# Patient Record
Sex: MALE | Race: WHITE | NOT HISPANIC OR LATINO | URBAN - METROPOLITAN AREA
[De-identification: names, ages, dates, MRNs, and addresses within clinical notes are randomized per-mention and may not be internally consistent; named-entity substitution may affect disease eponyms.]

---

## 2017-05-14 ENCOUNTER — INPATIENT (INPATIENT)
Facility: HOSPITAL | Age: 62
LOS: 0 days | Discharge: HOME | DRG: 192 | End: 2017-05-15
Attending: HOSPITALIST | Admitting: HOSPITALIST
Payer: MEDICAID

## 2017-05-14 VITALS
RESPIRATION RATE: 24 BRPM | HEART RATE: 98 BPM | SYSTOLIC BLOOD PRESSURE: 175 MMHG | HEIGHT: 69 IN | DIASTOLIC BLOOD PRESSURE: 90 MMHG | OXYGEN SATURATION: 93 % | WEIGHT: 139.99 LBS | TEMPERATURE: 94 F

## 2017-05-14 DIAGNOSIS — J44.9 CHRONIC OBSTRUCTIVE PULMONARY DISEASE, UNSPECIFIED: ICD-10-CM

## 2017-05-14 LAB
ALBUMIN SERPL ELPH-MCNC: 4 G/DL — SIGNIFICANT CHANGE UP (ref 3.3–5.2)
ALP SERPL-CCNC: 78 U/L — SIGNIFICANT CHANGE UP (ref 40–120)
ALT FLD-CCNC: 22 U/L — SIGNIFICANT CHANGE UP
ANION GAP SERPL CALC-SCNC: 11 MMOL/L — SIGNIFICANT CHANGE UP (ref 5–17)
ANISOCYTOSIS BLD QL: SLIGHT — SIGNIFICANT CHANGE UP
APTT BLD: 29.4 SEC — SIGNIFICANT CHANGE UP (ref 27.5–37.4)
AST SERPL-CCNC: 28 U/L — SIGNIFICANT CHANGE UP
BASE EXCESS BLDA CALC-SCNC: 6 MMOL/L — HIGH (ref -3–3)
BILIRUB SERPL-MCNC: 0.3 MG/DL — LOW (ref 0.4–2)
BLOOD GAS COMMENTS ARTERIAL: SIGNIFICANT CHANGE UP
BUN SERPL-MCNC: 13 MG/DL — SIGNIFICANT CHANGE UP (ref 8–20)
CALCIUM SERPL-MCNC: 9.1 MG/DL — SIGNIFICANT CHANGE UP (ref 8.6–10.2)
CHLORIDE SERPL-SCNC: 100 MMOL/L — SIGNIFICANT CHANGE UP (ref 98–107)
CO2 SERPL-SCNC: 29 MMOL/L — SIGNIFICANT CHANGE UP (ref 22–29)
CREAT SERPL-MCNC: 0.64 MG/DL — SIGNIFICANT CHANGE UP (ref 0.5–1.3)
GAS PNL BLDA: SIGNIFICANT CHANGE UP
GLUCOSE SERPL-MCNC: 127 MG/DL — HIGH (ref 70–115)
HCO3 BLDA-SCNC: 30 MMOL/L — HIGH (ref 20–26)
HCT VFR BLD CALC: 41.7 % — LOW (ref 42–52)
HGB BLD-MCNC: 13.4 G/DL — LOW (ref 14–18)
HOROWITZ INDEX BLDA+IHG-RTO: 0.21 — SIGNIFICANT CHANGE UP
HYPOCHROMIA BLD QL: SLIGHT — SIGNIFICANT CHANGE UP
INR BLD: 1.04 RATIO — SIGNIFICANT CHANGE UP (ref 0.88–1.16)
LYMPHOCYTES # BLD AUTO: 9 % — LOW (ref 20–55)
MACROCYTES BLD QL: SLIGHT — SIGNIFICANT CHANGE UP
MAGNESIUM SERPL-MCNC: 2.3 MG/DL — SIGNIFICANT CHANGE UP (ref 1.6–2.6)
MCHC RBC-ENTMCNC: 30.9 PG — SIGNIFICANT CHANGE UP (ref 27–31)
MCHC RBC-ENTMCNC: 32.1 G/DL — SIGNIFICANT CHANGE UP (ref 32–36)
MCV RBC AUTO: 96.1 FL — HIGH (ref 80–94)
MICROCYTES BLD QL: SLIGHT — SIGNIFICANT CHANGE UP
MONOCYTES NFR BLD AUTO: 6 % — SIGNIFICANT CHANGE UP (ref 3–10)
NEUTROPHILS NFR BLD AUTO: 85 % — HIGH (ref 37–73)
NT-PROBNP SERPL-SCNC: 183 PG/ML — SIGNIFICANT CHANGE UP (ref 0–300)
OVALOCYTES BLD QL SMEAR: SLIGHT — SIGNIFICANT CHANGE UP
PCO2 BLDA: 50 MMHG — HIGH (ref 35–45)
PH BLDA: 7.41 — SIGNIFICANT CHANGE UP (ref 7.35–7.45)
PLAT MORPH BLD: NORMAL — SIGNIFICANT CHANGE UP
PLATELET # BLD AUTO: 237 K/UL — SIGNIFICANT CHANGE UP (ref 150–400)
PO2 BLDA: 63 MMHG — LOW (ref 83–108)
POIKILOCYTOSIS BLD QL AUTO: SLIGHT — SIGNIFICANT CHANGE UP
POTASSIUM SERPL-MCNC: 4.8 MMOL/L — SIGNIFICANT CHANGE UP (ref 3.5–5.3)
POTASSIUM SERPL-SCNC: 4.8 MMOL/L — SIGNIFICANT CHANGE UP (ref 3.5–5.3)
PROT SERPL-MCNC: 7.8 G/DL — SIGNIFICANT CHANGE UP (ref 6.6–8.7)
PROTHROM AB SERPL-ACNC: 11.5 SEC — SIGNIFICANT CHANGE UP (ref 9.8–12.7)
RBC # BLD: 4.34 M/UL — LOW (ref 4.6–6.2)
RBC # FLD: 13.5 % — SIGNIFICANT CHANGE UP (ref 11–15.6)
RBC BLD AUTO: ABNORMAL
SAO2 % BLDA: 93 % — LOW (ref 95–99)
SODIUM SERPL-SCNC: 140 MMOL/L — SIGNIFICANT CHANGE UP (ref 135–145)
TROPONIN T SERPL-MCNC: <0.01 NG/ML — SIGNIFICANT CHANGE UP (ref 0–0.06)
TSH SERPL-MCNC: 1.72 UIU/ML — SIGNIFICANT CHANGE UP (ref 0.27–4.2)
WBC # BLD: 15.1 K/UL — HIGH (ref 4.8–10.8)
WBC # FLD AUTO: 15.1 K/UL — HIGH (ref 4.8–10.8)

## 2017-05-14 PROCEDURE — 71020: CPT | Mod: 26

## 2017-05-14 PROCEDURE — 99222 1ST HOSP IP/OBS MODERATE 55: CPT

## 2017-05-14 PROCEDURE — 99285 EMERGENCY DEPT VISIT HI MDM: CPT | Mod: 25

## 2017-05-14 PROCEDURE — 93010 ELECTROCARDIOGRAM REPORT: CPT

## 2017-05-14 RX ORDER — NICOTINE POLACRILEX 2 MG
1 GUM BUCCAL DAILY
Qty: 0 | Refills: 0 | Status: DISCONTINUED | OUTPATIENT
Start: 2017-05-14 | End: 2017-05-15

## 2017-05-14 RX ORDER — IPRATROPIUM/ALBUTEROL SULFATE 18-103MCG
3 AEROSOL WITH ADAPTER (GRAM) INHALATION EVERY 6 HOURS
Qty: 0 | Refills: 0 | Status: DISCONTINUED | OUTPATIENT
Start: 2017-05-14 | End: 2017-05-15

## 2017-05-14 RX ORDER — IPRATROPIUM/ALBUTEROL SULFATE 18-103MCG
3 AEROSOL WITH ADAPTER (GRAM) INHALATION ONCE
Qty: 0 | Refills: 0 | Status: COMPLETED | OUTPATIENT
Start: 2017-05-14 | End: 2017-05-14

## 2017-05-14 RX ORDER — ENOXAPARIN SODIUM 100 MG/ML
40 INJECTION SUBCUTANEOUS DAILY
Qty: 0 | Refills: 0 | Status: DISCONTINUED | OUTPATIENT
Start: 2017-05-14 | End: 2017-05-15

## 2017-05-14 RX ORDER — PANTOPRAZOLE SODIUM 20 MG/1
40 TABLET, DELAYED RELEASE ORAL
Qty: 0 | Refills: 0 | Status: DISCONTINUED | OUTPATIENT
Start: 2017-05-14 | End: 2017-05-15

## 2017-05-14 RX ORDER — AZITHROMYCIN 500 MG/1
500 TABLET, FILM COATED ORAL DAILY
Qty: 0 | Refills: 0 | Status: DISCONTINUED | OUTPATIENT
Start: 2017-05-14 | End: 2017-05-15

## 2017-05-14 RX ADMIN — Medication 3 MILLILITER(S): at 22:01

## 2017-05-14 RX ADMIN — Medication 40 MILLIGRAM(S): at 21:55

## 2017-05-14 RX ADMIN — Medication 3 MILLILITER(S): at 08:50

## 2017-05-14 RX ADMIN — Medication 3 MILLILITER(S): at 06:58

## 2017-05-14 RX ADMIN — ENOXAPARIN SODIUM 40 MILLIGRAM(S): 100 INJECTION SUBCUTANEOUS at 11:38

## 2017-05-14 RX ADMIN — Medication 40 MILLIGRAM(S): at 11:39

## 2017-05-14 RX ADMIN — AZITHROMYCIN 500 MILLIGRAM(S): 500 TABLET, FILM COATED ORAL at 11:38

## 2017-05-14 RX ADMIN — Medication 1 PATCH: at 17:17

## 2017-05-14 RX ADMIN — Medication 125 MILLIGRAM(S): at 08:11

## 2017-05-14 RX ADMIN — Medication 3 MILLILITER(S): at 15:17

## 2017-05-14 NOTE — ED ADULT NURSE REASSESSMENT NOTE - NS ED NURSE REASSESS COMMENT FT1
verbal report given to GAVIN liriano in holding area of ED. pt rseitng comfortably in cart. breathing si even and unlabored. pt awaiting bed availability on floor.

## 2017-05-14 NOTE — H&P ADULT - NSHPLABSRESULTS_GEN_ALL_CORE
13.4   15.1  )-----------( 237      ( 14 May 2017 08:18 )             41.7     05-14    140  |  100  |  13.0  ----------------------------<  127<H>  4.8   |  29.0  |  0.64    Ca    9.1      14 May 2017 08:18  Mg     2.3     05-14    TPro  7.8  /  Alb  4.0  /  TBili  0.3<L>  /  DBili  x   /  AST  28  /  ALT  22  /  AlkPhos  78  05-14    PT/INR - ( 14 May 2017 08:18 )   PT: 11.5 sec;   INR: 1.04 ratio         PTT - ( 14 May 2017 08:18 )  PTT:29.4 sec      CXR pending.

## 2017-05-14 NOTE — H&P ADULT - NSHPPHYSICALEXAM_GEN_ALL_CORE
Vital Signs   T(C): 36.7  HR: 89   BP: 156/78 mmhg.   RR: 18   SpO2: 98%     PHYSICAL EXAM:    GENERAL: Middle age male looking comfortable  HEENT: PERRL, +EOMI  NECK: soft, Supple, No JVD,   CHEST/LUNG: decrease air entry bilaterally; some wheezing b/l.   HEART: S1S2+, Regular rate and rhythm; No murmurs.   ABDOMEN: Soft, Nontender, Nondistended; Bowel sounds present  EXTREMITIES:  2+ Peripheral Pulses, No edema  SKIN: No rashes or lesions  NEURO: AAOX3, no focal deficits, no motor r sensory loss  PSYCH: normal mood

## 2017-05-14 NOTE — H&P ADULT - ASSESSMENT
Assessment: Given Patients info patient has COPD exacerbation.     Plan:  Oxygen, duo nebs, IV steroids, will rudolph, azithromycin follow CXR.   DVT prophylaxis Lovenox   GI protection: Protonix

## 2017-05-14 NOTE — H&P ADULT - NSHPREVIEWOFSYSTEMS_GEN_ALL_CORE
CONSTITUTIONAL: No fever, has some fatigue  RESPIRATORY:  has dry cough, wheezing and shortness of breath  CARDIOVASCULAR: No chest pain, palpitations  GASTROINTESTINAL: No abdominal or epigastric pain. No nausea, vomiting  NEUROLOGICAL: No headaches,  loss of strength.  MISCELLANEOUS: No joint swelling or pain

## 2017-05-14 NOTE — ED ADULT TRIAGE NOTE - CHIEF COMPLAINT QUOTE
I cant breath, I was diagnosed with COPD. I cant breath, I was diagnosed with COPD. wheeze BL, RR even and labored. Md called to bedside.

## 2017-05-14 NOTE — ED PROVIDER NOTE - PHYSICAL EXAMINATION
Constitutinal :Appears uncomfortable, talking in short sentences, low oxygen on ra  Head :NC AT , no swelling  Eyes :eomi, no swelling  Mouth :mm moist,  Neck : supple, trachea in midline  Chest :Laurent air entry, symm chest expansion, moderate distress, retractions, dec air entry, rare wheezing  Heart :S1 S2 distant  Abdomen :abd soft, non tender, thin  Musc/Skel :ext no swelling, no deformity, no spine tenderness, distal pulses present  Neuro  :AAO 3 no focal deficits

## 2017-05-14 NOTE — ED PROVIDER NOTE - NS ED ROS FT
no weight change, no fever or chills  no rash, no bruises  no visual changes no discharge  no cough cold or congestion,   has sob, no chest pain  no orthopnea, no pnd  no abd pain, no n/v/d  no hematuria, no change in urinary habits  no headache, no paresthesia  no previous psych evaluation

## 2017-05-14 NOTE — ED ADULT NURSE NOTE - OBJECTIVE STATEMENT
pt presents to ED with difficulty breathing since last night. pt has hx of COPD. b/l wheezing noted. denies chest pain. denies cough. afebrile. breathing is even and unlabored,. pt received on 2L02 is 98%. a&ox3. abd soft nt nd +bs denies n/v/d, skin wd/i. will continue to monitor and reassess

## 2017-05-14 NOTE — H&P ADULT - HISTORY OF PRESENT ILLNESS
62 y/o M with hx of extensive smoking for >25 years, comes in the cough and sob along with wheezing started couple of days ago and has been worsening, it started after he was trying to stop smoking, his cough is dry, smoking make it worse, patient denies fever, chills, chest pain, nausea, vomiting, he has no allergies, denies runny or stuffy nose, sore throat, no recent travel or sick contact, he works in construction company.

## 2017-05-15 ENCOUNTER — TRANSCRIPTION ENCOUNTER (OUTPATIENT)
Age: 62
End: 2017-05-15

## 2017-05-15 VITALS
OXYGEN SATURATION: 98 % | HEART RATE: 104 BPM | TEMPERATURE: 97 F | RESPIRATION RATE: 16 BRPM | DIASTOLIC BLOOD PRESSURE: 71 MMHG | SYSTOLIC BLOOD PRESSURE: 133 MMHG

## 2017-05-15 LAB
HCT VFR BLD CALC: 40.6 % — LOW (ref 42–52)
HGB BLD-MCNC: 13.4 G/DL — LOW (ref 14–18)
MAGNESIUM SERPL-MCNC: 2.1 MG/DL — SIGNIFICANT CHANGE UP (ref 1.8–2.6)
MCHC RBC-ENTMCNC: 31.3 PG — HIGH (ref 27–31)
MCHC RBC-ENTMCNC: 33 G/DL — SIGNIFICANT CHANGE UP (ref 32–36)
MCV RBC AUTO: 94.9 FL — HIGH (ref 80–94)
PHOSPHATE SERPL-MCNC: 3.2 MG/DL — SIGNIFICANT CHANGE UP (ref 2.4–4.7)
PLATELET # BLD AUTO: 240 K/UL — SIGNIFICANT CHANGE UP (ref 150–400)
RBC # BLD: 4.28 M/UL — LOW (ref 4.6–6.2)
RBC # FLD: 13.7 % — SIGNIFICANT CHANGE UP (ref 11–15.6)
WBC # BLD: 26.5 K/UL — HIGH (ref 4.8–10.8)
WBC # FLD AUTO: 26.5 K/UL — HIGH (ref 4.8–10.8)

## 2017-05-15 PROCEDURE — 85610 PROTHROMBIN TIME: CPT

## 2017-05-15 PROCEDURE — 99239 HOSP IP/OBS DSCHRG MGMT >30: CPT

## 2017-05-15 PROCEDURE — 85730 THROMBOPLASTIN TIME PARTIAL: CPT

## 2017-05-15 PROCEDURE — 96374 THER/PROPH/DIAG INJ IV PUSH: CPT

## 2017-05-15 PROCEDURE — 93005 ELECTROCARDIOGRAM TRACING: CPT

## 2017-05-15 PROCEDURE — 84100 ASSAY OF PHOSPHORUS: CPT

## 2017-05-15 PROCEDURE — 83880 ASSAY OF NATRIURETIC PEPTIDE: CPT

## 2017-05-15 PROCEDURE — 85027 COMPLETE CBC AUTOMATED: CPT

## 2017-05-15 PROCEDURE — 94640 AIRWAY INHALATION TREATMENT: CPT

## 2017-05-15 PROCEDURE — 80053 COMPREHEN METABOLIC PANEL: CPT

## 2017-05-15 PROCEDURE — 82803 BLOOD GASES ANY COMBINATION: CPT

## 2017-05-15 PROCEDURE — 99285 EMERGENCY DEPT VISIT HI MDM: CPT | Mod: 25

## 2017-05-15 PROCEDURE — 84443 ASSAY THYROID STIM HORMONE: CPT

## 2017-05-15 PROCEDURE — 83735 ASSAY OF MAGNESIUM: CPT

## 2017-05-15 PROCEDURE — 71046 X-RAY EXAM CHEST 2 VIEWS: CPT

## 2017-05-15 PROCEDURE — 84484 ASSAY OF TROPONIN QUANT: CPT

## 2017-05-15 PROCEDURE — 36415 COLL VENOUS BLD VENIPUNCTURE: CPT

## 2017-05-15 RX ORDER — NICOTINE POLACRILEX 2 MG
1 GUM BUCCAL
Qty: 20 | Refills: 0 | OUTPATIENT
Start: 2017-05-15 | End: 2017-06-04

## 2017-05-15 RX ORDER — AZITHROMYCIN 500 MG/1
1 TABLET, FILM COATED ORAL
Qty: 4 | Refills: 0 | OUTPATIENT
Start: 2017-05-15 | End: 2017-05-19

## 2017-05-15 RX ORDER — FLUTICASONE PROPIONATE AND SALMETEROL 50; 250 UG/1; UG/1
1 POWDER ORAL; RESPIRATORY (INHALATION)
Qty: 1 | Refills: 1 | OUTPATIENT
Start: 2017-05-15 | End: 2017-07-13

## 2017-05-15 RX ORDER — ALBUTEROL 90 UG/1
1 AEROSOL, METERED ORAL
Qty: 1 | Refills: 1 | OUTPATIENT
Start: 2017-05-15 | End: 2017-07-13

## 2017-05-15 RX ORDER — NICOTINE POLACRILEX 2 MG
1 GUM BUCCAL
Qty: 21 | Refills: 0 | OUTPATIENT
Start: 2017-05-15 | End: 2017-06-05

## 2017-05-15 RX ORDER — AZITHROMYCIN 500 MG/1
1 TABLET, FILM COATED ORAL
Qty: 3 | Refills: 0 | OUTPATIENT
Start: 2017-05-15 | End: 2017-05-18

## 2017-05-15 RX ORDER — PANTOPRAZOLE SODIUM 20 MG/1
1 TABLET, DELAYED RELEASE ORAL
Qty: 30 | Refills: 0 | OUTPATIENT
Start: 2017-05-15 | End: 2017-06-14

## 2017-05-15 RX ADMIN — ENOXAPARIN SODIUM 40 MILLIGRAM(S): 100 INJECTION SUBCUTANEOUS at 12:05

## 2017-05-15 RX ADMIN — Medication 3 MILLILITER(S): at 03:21

## 2017-05-15 RX ADMIN — Medication 1 PATCH: at 12:06

## 2017-05-15 RX ADMIN — Medication 40 MILLIGRAM(S): at 05:26

## 2017-05-15 RX ADMIN — Medication 3 MILLILITER(S): at 09:29

## 2017-05-15 RX ADMIN — AZITHROMYCIN 500 MILLIGRAM(S): 500 TABLET, FILM COATED ORAL at 12:06

## 2017-05-15 RX ADMIN — PANTOPRAZOLE SODIUM 40 MILLIGRAM(S): 20 TABLET, DELAYED RELEASE ORAL at 08:57

## 2017-05-15 NOTE — DISCHARGE NOTE ADULT - PATIENT PORTAL LINK FT
“You can access the [facility] Patient Portal, offered by [facility], by registering with the following website: [email or web address]”

## 2017-05-15 NOTE — DISCHARGE NOTE ADULT - CARE PLAN
Principal Discharge DX:	Chronic obstructive pulmonary disease with acute exacerbation  Goal:	take tappering steriod, antibiotics and inhalor  Instructions for follow-up, activity and diet:	Follow up with [facility] at [facility] in 1 week, please call for appointment.  Secondary Diagnosis:	Smoking  Goal:	Use nicotine patches, no smoking please. Principal Discharge DX:	Chronic obstructive pulmonary disease with acute exacerbation  Goal:	take tappering steriod, antibiotics and inhalor  Instructions for follow-up, activity and diet:	Follow up with [facility] at [facility] in 1 week, please call for appointment.  Secondary Diagnosis:	Smoking  Goal:	Use nicotine patches, no smoking please. Principal Discharge DX:	Chronic obstructive pulmonary disease with acute exacerbation  Goal:	take tappering steriod, antibiotics and inhalor  Instructions for follow-up, activity and diet:	Follow up with [facility] at [facility] in 1 week, please call for appointment.  Secondary Diagnosis:	Smoking  Goal:	Use nicotine patches, no smoking please. Principal Discharge DX:	Chronic obstructive pulmonary disease with acute exacerbation  Goal:	take tappering steriod, antibiotics and inhalor  Instructions for follow-up, activity and diet:	Follow up with [facility] at [facility] in 1 week, please call for appointment.  Secondary Diagnosis:	Smoking  Goal:	Use nicotine patches, no smoking please.

## 2017-05-15 NOTE — DISCHARGE NOTE ADULT - PLAN OF CARE
take tappering steriod, antibiotics and inhalor Follow up with [facility] at [facility] in 1 week, please call for appointment. Use nicotine patches, no smoking please.

## 2017-05-15 NOTE — DISCHARGE NOTE ADULT - MEDICATION SUMMARY - MEDICATIONS TO TAKE
I will START or STAY ON the medications listed below when I get home from the hospital:    predniSONE 5 mg oral tablet  -- 8 tab(s) by mouth once a day x 2 days 6 tab(s) by mouth once a day x 2 days 4 tab(s) by mouth once a day x 2 days 2 tab(s) by mouth once a day x 2 days 1 tab(s) by mouth once a day x 2 days  -- It is very important that you take or use this exactly as directed.  Do not skip doses or discontinue unless directed by your doctor.  Obtain medical advice before taking any non-prescription drugs as some may affect the action of this medication.  Take with food or milk.    -- Indication: For COPD (chronic obstructive pulmonary disease)    Advair Diskus 100 mcg-50 mcg inhalation powder  -- 1 puff(s) inhaled 2 times a day  -- Indication: For COPD (chronic obstructive pulmonary disease)    Ventolin HFA 90 mcg/inh inhalation aerosol  -- 1 puff(s) inhaled every 4 hours, As Needed  -- For inhalation only.  It is very important that you take or use this exactly as directed.  Do not skip doses or discontinue unless directed by your doctor.  Obtain medical advice before taking any non-prescription drugs as some may affect the action of this medication.  Shake well before use.    -- Indication: For COPD (chronic obstructive pulmonary disease)    Zithromax 500 mg oral tablet  -- 1 tab(s) by mouth once a day  -- Do not take dairy products, antacids, or iron preparations within one hour of this medication.  Finish all this medication unless otherwise directed by prescriber.    -- Indication: For COPD (chronic obstructive pulmonary disease)    pantoprazole 40 mg oral delayed release tablet  -- 1 tab(s) by mouth once a day (before a meal)  -- Indication: For GI protection     Habitrol 14 mg/24 hr transdermal film, extended release  -- 1 patch by transdermal patch once a day  -- Do not take this drug if you are pregnant.  For external use only.  It is very important that you take or use this exactly as directed.  Do not skip doses or discontinue unless directed by your doctor.  Remove old patch prior to applying a new patch.    -- Indication: For Smoking     nicotine 21 mg/24 hr transdermal film, extended release  -- 1 by transdermal patch once a day  -- Indication: For Smoking     Nicoderm C-Q 7 mg/24 hr transdermal film, extended release  -- 1 patch by transdermal patch once a day  -- Do not take this drug if you are pregnant.  For external use only.  It is very important that you take or use this exactly as directed.  Do not skip doses or discontinue unless directed by your doctor.  Remove old patch prior to applying a new patch.    -- Indication: For Smoking

## 2017-05-15 NOTE — DISCHARGE NOTE ADULT - HOSPITAL COURSE
Patient was admitted under medicine, was started on dual nebs, steroids and azithromycin his wheezing and SOB improved, he has no more cough or wheezing, able to ambulate, does not require any supplemental oxygen, he is being discharged home in stable, he has elevated WBCs looks like steriod induced, need to be checked in 1 week, as per patient he is going to follow the [facility] in 1 week.   He has been given script for the tappiring steriods, inhalors and antibiotics.     Vital Signs   T(C): 36.2  HR: 104   BP: 133/71 mmhg.   RR: 16   SpO2: 98%     PHYSICAL EXAM:    GENERAL: Middle age male looking comfortable   HEENT: PERRL, +EOMI  NECK: soft, Supple, No JVD,   CHEST/LUNG: decrease air entry bilaterally; No wheezing  HEART: S1S2+, Regular rate and rhythm; No murmurs.   ABDOMEN: Soft, Nontender, Nondistended; Bowel sounds present  EXTREMITIES:  2+ Peripheral Pulses, No edema  SKIN: No rashes or lesions  NEURO: AAOX3, no focal deficits, no motor r sensory loss  PSYCH: normal mood    total time spent 35 minutes. Patient was admitted under medicine, was started on dual nebs, steroids and azithromycin his wheezing and SOB improved, he has no more cough or wheezing, able to ambulate, does not require any supplemental oxygen, he is being discharged home in stable, he has elevated WBCs looks like steriod induced, need to be checked in 1 week, as per patient he is going to follow the [facility] in 1 week.   He has been given script for the tappiring steriods, inhalors and antibiotics.   Patient has Hx of smoking, he was counselled and was provided with nicotine patches, he is doing well, his symptoms has been resolved, he is being discharged home in a stable condition.     Vital Signs   T(C): 36.2  HR: 104   BP: 133/71 mmhg.   RR: 16   SpO2: 98%     PHYSICAL EXAM:    GENERAL: Middle age male looking comfortable   HEENT: PERRL, +EOMI  NECK: soft, Supple, No JVD,   CHEST/LUNG: decrease air entry bilaterally; No wheezing  HEART: S1S2+, Regular rate and rhythm; No murmurs.   ABDOMEN: Soft, Nontender, Nondistended; Bowel sounds present  EXTREMITIES:  2+ Peripheral Pulses, No edema  SKIN: No rashes or lesions  NEURO: AAOX3, no focal deficits, no motor r sensory loss  PSYCH: normal mood    total time spent 35 minutes.

## 2017-05-15 NOTE — ED ADULT NURSE REASSESSMENT NOTE - NS ED NURSE REASSESS COMMENT FT1
Pt has been sleeping most of the night, no difficulty breathing noted. Pt medicated as ordered. VSS, NAD. Awaiting bed assignment.

## 2017-05-15 NOTE — DISCHARGE NOTE ADULT - ADDITIONAL INSTRUCTIONS
Follow at [facility] in 1 week, please call for the appointment, please ask your doctor to get your CBC.

## 2018-11-01 ENCOUNTER — OUTPATIENT (OUTPATIENT)
Dept: OUTPATIENT SERVICES | Facility: HOSPITAL | Age: 63
LOS: 1 days | End: 2018-11-01
Payer: MEDICARE

## 2018-11-01 DIAGNOSIS — Z76.89 PERSONS ENCOUNTERING HEALTH SERVICES IN OTHER SPECIFIED CIRCUMSTANCES: ICD-10-CM

## 2018-11-26 ENCOUNTER — INPATIENT (INPATIENT)
Facility: HOSPITAL | Age: 63
LOS: 8 days | Discharge: HOME | End: 2018-12-05
Attending: FAMILY MEDICINE | Admitting: FAMILY MEDICINE
Payer: MEDICARE

## 2018-11-26 VITALS
DIASTOLIC BLOOD PRESSURE: 57 MMHG | SYSTOLIC BLOOD PRESSURE: 123 MMHG | WEIGHT: 139.99 LBS | OXYGEN SATURATION: 100 % | TEMPERATURE: 99 F | HEIGHT: 69 IN | RESPIRATION RATE: 20 BRPM | HEART RATE: 70 BPM

## 2018-11-26 LAB
ALBUMIN SERPL ELPH-MCNC: 3 G/DL — LOW (ref 3.3–5)
ALP SERPL-CCNC: 108 U/L — SIGNIFICANT CHANGE UP (ref 40–120)
ALT FLD-CCNC: 27 U/L — SIGNIFICANT CHANGE UP (ref 12–78)
ANION GAP SERPL CALC-SCNC: 6 MMOL/L — SIGNIFICANT CHANGE UP (ref 5–17)
APTT BLD: 28.4 SEC — SIGNIFICANT CHANGE UP (ref 27.5–36.3)
AST SERPL-CCNC: 25 U/L — SIGNIFICANT CHANGE UP (ref 15–37)
BASE EXCESS BLDV CALC-SCNC: 8.5 MMOL/L — HIGH (ref -2–2)
BASOPHILS # BLD AUTO: 0.1 K/UL — SIGNIFICANT CHANGE UP (ref 0–0.2)
BASOPHILS NFR BLD AUTO: 0.4 % — SIGNIFICANT CHANGE UP (ref 0–2)
BILIRUB SERPL-MCNC: 0.4 MG/DL — SIGNIFICANT CHANGE UP (ref 0.2–1.2)
BUN SERPL-MCNC: 13 MG/DL — SIGNIFICANT CHANGE UP (ref 7–23)
CALCIUM SERPL-MCNC: 8.9 MG/DL — SIGNIFICANT CHANGE UP (ref 8.5–10.1)
CHLORIDE SERPL-SCNC: 97 MMOL/L — SIGNIFICANT CHANGE UP (ref 96–108)
CO2 SERPL-SCNC: 34 MMOL/L — HIGH (ref 22–31)
CREAT SERPL-MCNC: 0.79 MG/DL — SIGNIFICANT CHANGE UP (ref 0.5–1.3)
EOSINOPHIL # BLD AUTO: 0.08 K/UL — SIGNIFICANT CHANGE UP (ref 0–0.5)
EOSINOPHIL NFR BLD AUTO: 0.3 % — SIGNIFICANT CHANGE UP (ref 0–6)
GLUCOSE SERPL-MCNC: 107 MG/DL — HIGH (ref 70–99)
HCO3 BLDV-SCNC: 36 MMOL/L — HIGH (ref 21–29)
HCT VFR BLD CALC: 46.8 % — SIGNIFICANT CHANGE UP (ref 39–50)
HGB BLD-MCNC: 15.3 G/DL — SIGNIFICANT CHANGE UP (ref 13–17)
IMM GRANULOCYTES NFR BLD AUTO: 0.8 % — SIGNIFICANT CHANGE UP (ref 0–1.5)
INR BLD: 1.23 RATIO — HIGH (ref 0.88–1.16)
LACTATE SERPL-SCNC: 1.1 MMOL/L — SIGNIFICANT CHANGE UP (ref 0.7–2)
LYMPHOCYTES # BLD AUTO: 1.57 K/UL — SIGNIFICANT CHANGE UP (ref 1–3.3)
LYMPHOCYTES # BLD AUTO: 6.3 % — LOW (ref 13–44)
MANUAL SMEAR VERIFICATION: SIGNIFICANT CHANGE UP
MCHC RBC-ENTMCNC: 31.2 PG — SIGNIFICANT CHANGE UP (ref 27–34)
MCHC RBC-ENTMCNC: 32.7 GM/DL — SIGNIFICANT CHANGE UP (ref 32–36)
MCV RBC AUTO: 95.5 FL — SIGNIFICANT CHANGE UP (ref 80–100)
MONOCYTES # BLD AUTO: 1.51 K/UL — HIGH (ref 0–0.9)
MONOCYTES NFR BLD AUTO: 6 % — SIGNIFICANT CHANGE UP (ref 2–14)
NEUTROPHILS # BLD AUTO: 21.54 K/UL — HIGH (ref 1.8–7.4)
NEUTROPHILS NFR BLD AUTO: 86.2 % — HIGH (ref 43–77)
NRBC # BLD: 0 /100 WBCS — SIGNIFICANT CHANGE UP (ref 0–0)
NT-PROBNP SERPL-SCNC: 272 PG/ML — HIGH (ref 0–125)
PCO2 BLDV: 65 MMHG — HIGH (ref 35–50)
PH BLDV: 7.37 — SIGNIFICANT CHANGE UP (ref 7.35–7.45)
PLAT MORPH BLD: NORMAL — SIGNIFICANT CHANGE UP
PLATELET # BLD AUTO: 363 K/UL — SIGNIFICANT CHANGE UP (ref 150–400)
PLATELET COUNT - ESTIMATE: NORMAL — SIGNIFICANT CHANGE UP
PO2 BLDV: 33 MMHG — SIGNIFICANT CHANGE UP (ref 25–45)
POTASSIUM SERPL-MCNC: 4.1 MMOL/L — SIGNIFICANT CHANGE UP (ref 3.5–5.3)
POTASSIUM SERPL-SCNC: 4.1 MMOL/L — SIGNIFICANT CHANGE UP (ref 3.5–5.3)
PROT SERPL-MCNC: 8.4 GM/DL — HIGH (ref 6–8.3)
PROTHROM AB SERPL-ACNC: 13.7 SEC — HIGH (ref 10–12.9)
RAPID RVP RESULT: SIGNIFICANT CHANGE UP
RBC # BLD: 4.9 M/UL — SIGNIFICANT CHANGE UP (ref 4.2–5.8)
RBC # FLD: 12.3 % — SIGNIFICANT CHANGE UP (ref 10.3–14.5)
RBC BLD AUTO: NORMAL — SIGNIFICANT CHANGE UP
SAO2 % BLDV: 54 % — LOW (ref 67–88)
SODIUM SERPL-SCNC: 137 MMOL/L — SIGNIFICANT CHANGE UP (ref 135–145)
TROPONIN I SERPL-MCNC: <0.015 NG/ML — SIGNIFICANT CHANGE UP (ref 0.01–0.04)
WBC # BLD: 25 K/UL — HIGH (ref 3.8–10.5)
WBC # FLD AUTO: 25 K/UL — HIGH (ref 3.8–10.5)

## 2018-11-26 PROCEDURE — 71045 X-RAY EXAM CHEST 1 VIEW: CPT | Mod: 26

## 2018-11-26 PROCEDURE — 71250 CT THORAX DX C-: CPT | Mod: 26

## 2018-11-26 PROCEDURE — 93010 ELECTROCARDIOGRAM REPORT: CPT

## 2018-11-26 PROCEDURE — 99285 EMERGENCY DEPT VISIT HI MDM: CPT

## 2018-11-26 RX ORDER — SODIUM CHLORIDE 9 MG/ML
1000 INJECTION, SOLUTION INTRAVENOUS
Qty: 0 | Refills: 0 | Status: DISCONTINUED | OUTPATIENT
Start: 2018-11-26 | End: 2018-11-26

## 2018-11-26 RX ORDER — SODIUM CHLORIDE 9 MG/ML
1500 INJECTION INTRAMUSCULAR; INTRAVENOUS; SUBCUTANEOUS ONCE
Qty: 0 | Refills: 0 | Status: COMPLETED | OUTPATIENT
Start: 2018-11-26 | End: 2018-11-26

## 2018-11-26 RX ORDER — MAGNESIUM SULFATE 500 MG/ML
1 VIAL (ML) INJECTION ONCE
Qty: 0 | Refills: 0 | Status: COMPLETED | OUTPATIENT
Start: 2018-11-26 | End: 2018-11-26

## 2018-11-26 RX ORDER — IPRATROPIUM/ALBUTEROL SULFATE 18-103MCG
3 AEROSOL WITH ADAPTER (GRAM) INHALATION ONCE
Qty: 0 | Refills: 0 | Status: COMPLETED | OUTPATIENT
Start: 2018-11-26 | End: 2018-11-26

## 2018-11-26 RX ORDER — SODIUM CHLORIDE 9 MG/ML
1000 INJECTION INTRAMUSCULAR; INTRAVENOUS; SUBCUTANEOUS
Qty: 0 | Refills: 0 | Status: DISCONTINUED | OUTPATIENT
Start: 2018-11-26 | End: 2018-11-28

## 2018-11-26 RX ADMIN — Medication 125 MILLIGRAM(S): at 17:51

## 2018-11-26 RX ADMIN — SODIUM CHLORIDE 100 MILLILITER(S): 9 INJECTION, SOLUTION INTRAVENOUS at 20:27

## 2018-11-26 RX ADMIN — Medication 3 MILLILITER(S): at 17:52

## 2018-11-26 RX ADMIN — Medication 3 MILLILITER(S): at 17:51

## 2018-11-26 RX ADMIN — Medication 100 GRAM(S): at 20:31

## 2018-11-26 RX ADMIN — SODIUM CHLORIDE 1500 MILLILITER(S): 9 INJECTION INTRAMUSCULAR; INTRAVENOUS; SUBCUTANEOUS at 17:51

## 2018-11-26 NOTE — H&P ADULT - HISTORY OF PRESENT ILLNESS
62 y/o M PMHx significant for COPD, long-standing hx of tobacco use, and hepatitis C presents to the ED c/o shortness of breath, wheezing, and productive cough worse over the last 1-2 days. In triage VS => /min, RR 22/min. Labs => WBC 25, HCO3 34, Lactate 1.1, RVP (-). In the ED the patient was given Levofloxacin 500mg IVPB x 1, Magnesium sulfate 1g IVPB x 1, Methylprednisolone 125mg IVP x 1, NS x 1.5L, and Combivent nebs x 3. 64 y/o M PMHx significant for COPD, long-standing hx of tobacco use, and hepatitis C presents to the ED c/o shortness of breath, wheezing, and productive cough worse over the last 1-2 days. In triage VS => /min, RR 22/min. Labs => WBC 25, HCO3 34, Lactate 1.1, RVP (-). In the ED the patient was given Levofloxacin 500mg IVPB x 1, Magnesium sulfate 1g IVPB x 1, Methylprednisolone 125mg IVP x 1, NS x 1.5L, and Combivent nebs x 3. CT Chest => 1. Centrilobular and paraseptal emphysema. 2. Scattered tree in bud opacities. Bilateral bronchial wall thickening. Findings compatible with nonspecific bronchiolitis.

## 2018-11-26 NOTE — ED PROVIDER NOTE - MEDICAL DECISION MAKING DETAILS
Pt with hx of COPD, here with cough and SOB, plan for labs, CXR, nebs, steroids, abx, admission. 64 y/o m with PMHx of COPD, hepatitis C presenting to the ED c/o SOB, cough xfew weeks, worse today.  Pt with hx of COPD, here with cough and SOB, plan for labs, CXR, nebs, steroids, abx, ivf, cutltures  admission.

## 2018-11-26 NOTE — H&P ADULT - PROBLEM SELECTOR PLAN 4
IMPROVE VTE Risk Score is 2  [  ] Previous VTE                                                  3  [  ] Thrombophilia                                               2  [  ] Lower limb paralysis                                      2        (unable to hold up >15 seconds)    [  ] Current Cancer                                              2         (within 6 months)  [X] Immobilization > 24 hrs                                1  [  ] ICU/CCU stay > 24 hours                              1  [X] Age > 60                                                      1  ~cont. LMWH sq

## 2018-11-26 NOTE — H&P ADULT - ASSESSMENT
64 y/o M PMHx significant for COPD, long-standing hx of tobacco use, and hepatitis C presents to the ED c/o shortness of breath, wheezing, and productive cough worse over the last 1-2 days. In triage VS => /min, RR 22/min. Labs => WBC 25, HCO3 34, Lactate 1.1, RVP (-). In the ED the patient was given Levofloxacin 500mg IVPB x 1, Magnesium sulfate 1g IVPB x 1, Methylprednisolone 125mg IVP x 1, NS x 1.5L, and Combivent nebs x 3. 64 y/o M PMHx significant for COPD, long-standing hx of tobacco use, and hepatitis C presents to the ED c/o shortness of breath, wheezing, and productive cough worse over the last 1-2 days. In triage VS => /min, RR 22/min. Labs => WBC 25, HCO3 34, Lactate 1.1, RVP (-). In the ED the patient was given Levofloxacin 500mg IVPB x 1, Magnesium sulfate 1g IVPB x 1, Methylprednisolone 125mg IVP x 1, NS x 1.5L, and Combivent nebs x 3. CT Chest => 1. Centrilobular and paraseptal emphysema. 2. Scattered tree in bud opacities. Bilateral bronchial wall thickening. Findings compatible with nonspecific bronchiolitis.

## 2018-11-26 NOTE — ED PROVIDER NOTE - OBJECTIVE STATEMENT
64 y/o m with PMHx of COPD, hepatitis C presenting to the ED c/o SOB, cough xfew weeks, worse today. Cough productive of green sputum. Denies fever, chills, CP, any other acute c/o. Scheduled for firs appointment with Dr. Du regarding current sx on 12/6/18, presenting to ED today due to worsening of sx. No recent travel. Smokes 1/4 PPD cigarettes. Does not drink. NKDA. 64 y/o m with PMHx of COPD, hepatitis C presenting to the ED c/o SOB, cough xfew weeks, worse today. Cough productive of green sputum. Denies fever, chills, CP, any other acute c/o. Scheduled for first appointment with Dr. Du regarding current sx on 12/6/18, presenting to ED today due to worsening of sx. No recent travel. Smokes 1/4 PPD cigarettes. Does not drink. NKDA.  states he had some chest tightness with inspiration and cough 62 y/o m with PMHx of COPD, hepatitis C presenting to the ED c/o SOB, cough x few weeks, worse today. Cough productive of green sputum. Denies fever, chills, CP, any other acute c/o. Scheduled for first appointment with Dr. Du regarding current sx on 12/6/18, presenting to ED today due to worsening of sx. No recent travel. Smokes 1/4 PPD cigarettes. Does not drink. NKDA.  states he had some chest tightness with inspiration and cough

## 2018-11-26 NOTE — H&P ADULT - NSHPPHYSICALEXAM_GEN_ALL_CORE
Vital Signs Last 24 Hrs  T(C): 37.3 (26 Nov 2018 16:56), Max: 37.3 (26 Nov 2018 16:56)  T(F): 99.2 (26 Nov 2018 16:56), Max: 99.2 (26 Nov 2018 16:56)  HR: 110 (26 Nov 2018 21:53) (70 - 131)  BP: 100/72 (26 Nov 2018 21:53) (100/72 - 157/81)  RR: 24 (26 Nov 2018 21:53) (20 - 28)  SpO2: 98% (26 Nov 2018 21:53) (98% - 100%)

## 2018-11-26 NOTE — H&P ADULT - PROBLEM SELECTOR PLAN 1
~admit to Medicine  ~cont. nebs  ~cont. IV steroids  ~f/u am ABG  ~cont. supplemental O2  ~f/u w/ Pulmonology in the am

## 2018-11-26 NOTE — ED PROVIDER NOTE - CARE PLAN
Principal Discharge DX:	COPD exacerbation  Assessment and plan of treatment:	abx, nebs, pulm  Secondary Diagnosis:	Community acquired pneumonia

## 2018-11-27 DIAGNOSIS — B18.2 CHRONIC VIRAL HEPATITIS C: ICD-10-CM

## 2018-11-27 DIAGNOSIS — J18.9 PNEUMONIA, UNSPECIFIED ORGANISM: ICD-10-CM

## 2018-11-27 DIAGNOSIS — J44.1 CHRONIC OBSTRUCTIVE PULMONARY DISEASE WITH (ACUTE) EXACERBATION: ICD-10-CM

## 2018-11-27 DIAGNOSIS — Z29.9 ENCOUNTER FOR PROPHYLACTIC MEASURES, UNSPECIFIED: ICD-10-CM

## 2018-11-27 LAB
ALBUMIN SERPL ELPH-MCNC: 2.4 G/DL — LOW (ref 3.3–5)
ALP SERPL-CCNC: 85 U/L — SIGNIFICANT CHANGE UP (ref 40–120)
ALT FLD-CCNC: 21 U/L — SIGNIFICANT CHANGE UP (ref 12–78)
ANION GAP SERPL CALC-SCNC: 7 MMOL/L — SIGNIFICANT CHANGE UP (ref 5–17)
AST SERPL-CCNC: 17 U/L — SIGNIFICANT CHANGE UP (ref 15–37)
BASE EXCESS BLDA CALC-SCNC: 5 MMOL/L — HIGH (ref -2–2)
BASOPHILS # BLD AUTO: 0 K/UL — SIGNIFICANT CHANGE UP (ref 0–0.2)
BASOPHILS NFR BLD AUTO: 0 % — SIGNIFICANT CHANGE UP (ref 0–2)
BILIRUB SERPL-MCNC: 0.3 MG/DL — SIGNIFICANT CHANGE UP (ref 0.2–1.2)
BLOOD GAS COMMENTS ARTERIAL: SIGNIFICANT CHANGE UP
BUN SERPL-MCNC: 12 MG/DL — SIGNIFICANT CHANGE UP (ref 7–23)
CALCIUM SERPL-MCNC: 8.2 MG/DL — LOW (ref 8.5–10.1)
CHLORIDE SERPL-SCNC: 105 MMOL/L — SIGNIFICANT CHANGE UP (ref 96–108)
CO2 SERPL-SCNC: 30 MMOL/L — SIGNIFICANT CHANGE UP (ref 22–31)
CREAT SERPL-MCNC: 0.67 MG/DL — SIGNIFICANT CHANGE UP (ref 0.5–1.3)
EOSINOPHIL # BLD AUTO: 0 K/UL — SIGNIFICANT CHANGE UP (ref 0–0.5)
EOSINOPHIL NFR BLD AUTO: 0 % — SIGNIFICANT CHANGE UP (ref 0–6)
GAS PNL BLDA: SIGNIFICANT CHANGE UP
GLUCOSE SERPL-MCNC: 135 MG/DL — HIGH (ref 70–99)
HCO3 BLDA-SCNC: 29 MMOL/L — SIGNIFICANT CHANGE UP (ref 21–29)
HCT VFR BLD CALC: 39.5 % — SIGNIFICANT CHANGE UP (ref 39–50)
HGB BLD-MCNC: 13 G/DL — SIGNIFICANT CHANGE UP (ref 13–17)
LYMPHOCYTES # BLD AUTO: 0.8 K/UL — LOW (ref 1–3.3)
LYMPHOCYTES # BLD AUTO: 3 % — LOW (ref 13–44)
M PNEUMO IGM SER-ACNC: 256 UNITS/ML — SIGNIFICANT CHANGE UP
MAGNESIUM SERPL-MCNC: 2.3 MG/DL — SIGNIFICANT CHANGE UP (ref 1.6–2.6)
MANUAL SMEAR VERIFICATION: SIGNIFICANT CHANGE UP
MCHC RBC-ENTMCNC: 31 PG — SIGNIFICANT CHANGE UP (ref 27–34)
MCHC RBC-ENTMCNC: 32.9 GM/DL — SIGNIFICANT CHANGE UP (ref 32–36)
MCV RBC AUTO: 94 FL — SIGNIFICANT CHANGE UP (ref 80–100)
MONOCYTES # BLD AUTO: 0.53 K/UL — SIGNIFICANT CHANGE UP (ref 0–0.9)
MONOCYTES NFR BLD AUTO: 2 % — SIGNIFICANT CHANGE UP (ref 2–14)
MYCOPLASMA AG SPEC QL: NEGATIVE — SIGNIFICANT CHANGE UP
NEUTROPHILS # BLD AUTO: 25.03 K/UL — HIGH (ref 1.8–7.4)
NEUTROPHILS NFR BLD AUTO: 83 % — HIGH (ref 43–77)
NEUTS BAND # BLD: 11 % — HIGH (ref 0–8)
NRBC # BLD: 0 /100 — SIGNIFICANT CHANGE UP (ref 0–0)
NRBC # BLD: SIGNIFICANT CHANGE UP /100 WBCS (ref 0–0)
PCO2 BLDA: 41 MMHG — SIGNIFICANT CHANGE UP (ref 32–46)
PH BLDA: 7.46 — HIGH (ref 7.35–7.45)
PLAT MORPH BLD: NORMAL — SIGNIFICANT CHANGE UP
PLATELET # BLD AUTO: 284 K/UL — SIGNIFICANT CHANGE UP (ref 150–400)
PO2 BLDA: 71 MMHG — LOW (ref 74–108)
POTASSIUM SERPL-MCNC: 3.8 MMOL/L — SIGNIFICANT CHANGE UP (ref 3.5–5.3)
POTASSIUM SERPL-SCNC: 3.8 MMOL/L — SIGNIFICANT CHANGE UP (ref 3.5–5.3)
PROCALCITONIN SERPL-MCNC: 0.07 NG/ML — SIGNIFICANT CHANGE UP (ref 0.02–0.1)
PROT SERPL-MCNC: 7.2 GM/DL — SIGNIFICANT CHANGE UP (ref 6–8.3)
RBC # BLD: 4.2 M/UL — SIGNIFICANT CHANGE UP (ref 4.2–5.8)
RBC # FLD: 12.5 % — SIGNIFICANT CHANGE UP (ref 10.3–14.5)
RBC BLD AUTO: NORMAL — SIGNIFICANT CHANGE UP
SAO2 % BLDA: 94 % — SIGNIFICANT CHANGE UP (ref 92–96)
SODIUM SERPL-SCNC: 142 MMOL/L — SIGNIFICANT CHANGE UP (ref 135–145)
VARIANT LYMPHS # BLD: 1 % — SIGNIFICANT CHANGE UP (ref 0–6)
WBC # BLD: 26.63 K/UL — HIGH (ref 3.8–10.5)
WBC # FLD AUTO: 26.63 K/UL — HIGH (ref 3.8–10.5)

## 2018-11-27 RX ORDER — AZITHROMYCIN 500 MG/1
500 TABLET, FILM COATED ORAL EVERY 24 HOURS
Qty: 0 | Refills: 0 | Status: DISCONTINUED | OUTPATIENT
Start: 2018-11-27 | End: 2018-11-30

## 2018-11-27 RX ORDER — SENNA PLUS 8.6 MG/1
2 TABLET ORAL AT BEDTIME
Qty: 0 | Refills: 0 | Status: DISCONTINUED | OUTPATIENT
Start: 2018-11-27 | End: 2018-12-05

## 2018-11-27 RX ORDER — IPRATROPIUM/ALBUTEROL SULFATE 18-103MCG
3 AEROSOL WITH ADAPTER (GRAM) INHALATION EVERY 6 HOURS
Qty: 0 | Refills: 0 | Status: DISCONTINUED | OUTPATIENT
Start: 2018-11-27 | End: 2018-12-05

## 2018-11-27 RX ORDER — ONDANSETRON 8 MG/1
4 TABLET, FILM COATED ORAL EVERY 6 HOURS
Qty: 0 | Refills: 0 | Status: DISCONTINUED | OUTPATIENT
Start: 2018-11-27 | End: 2018-12-05

## 2018-11-27 RX ORDER — CEFTRIAXONE 500 MG/1
1000 INJECTION, POWDER, FOR SOLUTION INTRAMUSCULAR; INTRAVENOUS EVERY 24 HOURS
Qty: 0 | Refills: 0 | Status: DISCONTINUED | OUTPATIENT
Start: 2018-11-27 | End: 2018-11-30

## 2018-11-27 RX ORDER — NICOTINE POLACRILEX 2 MG
1 GUM BUCCAL DAILY
Qty: 0 | Refills: 0 | Status: DISCONTINUED | OUTPATIENT
Start: 2018-11-27 | End: 2018-12-05

## 2018-11-27 RX ORDER — ENOXAPARIN SODIUM 100 MG/ML
40 INJECTION SUBCUTANEOUS EVERY 24 HOURS
Qty: 0 | Refills: 0 | Status: DISCONTINUED | OUTPATIENT
Start: 2018-11-27 | End: 2018-12-05

## 2018-11-27 RX ORDER — DOCUSATE SODIUM 100 MG
100 CAPSULE ORAL THREE TIMES A DAY
Qty: 0 | Refills: 0 | Status: DISCONTINUED | OUTPATIENT
Start: 2018-11-27 | End: 2018-12-05

## 2018-11-27 RX ORDER — ACETAMINOPHEN 500 MG
650 TABLET ORAL EVERY 6 HOURS
Qty: 0 | Refills: 0 | Status: DISCONTINUED | OUTPATIENT
Start: 2018-11-27 | End: 2018-12-05

## 2018-11-27 RX ADMIN — Medication 1200 MILLIGRAM(S): at 18:13

## 2018-11-27 RX ADMIN — Medication 40 MILLIGRAM(S): at 14:05

## 2018-11-27 RX ADMIN — Medication 3 MILLILITER(S): at 07:39

## 2018-11-27 RX ADMIN — SODIUM CHLORIDE 75 MILLILITER(S): 9 INJECTION INTRAMUSCULAR; INTRAVENOUS; SUBCUTANEOUS at 06:06

## 2018-11-27 RX ADMIN — Medication 3 MILLILITER(S): at 02:20

## 2018-11-27 RX ADMIN — Medication 40 MILLIGRAM(S): at 06:06

## 2018-11-27 RX ADMIN — Medication 3 MILLILITER(S): at 13:25

## 2018-11-27 RX ADMIN — Medication 40 MILLIGRAM(S): at 21:24

## 2018-11-27 RX ADMIN — AZITHROMYCIN 250 MILLIGRAM(S): 500 TABLET, FILM COATED ORAL at 08:32

## 2018-11-27 RX ADMIN — CEFTRIAXONE 1000 MILLIGRAM(S): 500 INJECTION, POWDER, FOR SOLUTION INTRAMUSCULAR; INTRAVENOUS at 08:32

## 2018-11-27 RX ADMIN — Medication 100 MILLIGRAM(S): at 00:58

## 2018-11-27 RX ADMIN — Medication 3 MILLILITER(S): at 20:30

## 2018-11-27 RX ADMIN — ENOXAPARIN SODIUM 40 MILLIGRAM(S): 100 INJECTION SUBCUTANEOUS at 08:32

## 2018-11-27 RX ADMIN — Medication 1 PATCH: at 18:13

## 2018-11-27 NOTE — PROGRESS NOTE ADULT - SUBJECTIVE AND OBJECTIVE BOX
CC: Shortness of Breath, Wheezing, and Cough (27 Nov 2018 08:49)    HPI:  64 y/o M PMHx significant for COPD, long-standing hx of tobacco use, and hepatitis C presents to the ED c/o shortness of breath, wheezing, and productive cough worse over the last 1-2 days.  He reports that start feeling sick over 1 weeks, In triage VS => /min, RR 22/min. Labs => WBC 25, HCO3 34, Lactate 1.1, RVP (-). In the ED the patient was given Levofloxacin 500mg IVPB x 1, Magnesium sulfate 1g IVPB x 1, Methylprednisolone 125mg IVP x 1, NS x 1.5L, and Combivent nebs x 3. CT Chest => 1. Centrilobular and paraseptal emphysema. 2. Scattered tree in bud opacities. Bilateral bronchial wall thickening. Findings compatible with nonspecific bronchiolitis. (26 Nov 2018 22:41)    INTERVAL HPI/ OVERNIGHT EVENTS: Chart reviewed, Pt was seen and examined  report SOB little better, still cough. No fevers or chills. No CP    Vital Signs Last 24 Hrs  T(C): 36.8 (27 Nov 2018 11:36), Max: 37 (27 Nov 2018 06:15)  T(F): 98.2 (27 Nov 2018 11:36), Max: 98.6 (27 Nov 2018 06:15)  HR: 88 (27 Nov 2018 13:25) (74 - 131)  BP: 147/74 (27 Nov 2018 11:36) (100/72 - 157/81)    RR: 16 (27 Nov 2018 11:36) (16 - 28)  SpO2: 97% (27 Nov 2018 11:36) (82% - 100%)        REVIEW OF SYSTEMS:  All other review of systems is negative unless indicated above.      PHYSICAL EXAM:  General: Well developed;  in no acute distress  Eyes: PERRLA, EOMI; conjunctiva and sclera clear  Head: Normocephalic; atraumatic  ENMT: No nasal discharge; airway clear  Neck: Supple; no JVD   Respiratory: Decreased BS, with b/l wheezes   Cardiovascular: Regular rate and rhythm. S1 and S2 Normal; No murmurs  Gastrointestinal: Soft non-tender non-distended; Normal bowel sounds  Genitourinary: No costovertebral angle tenderness  Extremities: Normal range of motion, No edema  Vascular: Peripheral pulses palpable 2+ bilaterally  Neurological: Alert and oriented x4, non focal   Skin: Warm and dry. No acute rash  Lymph Nodes: No acute cervical adenopathy  Musculoskeletal: Normal muscle tone, without deformities  Psychiatric: Cooperative and appropriate      LABS:   CARDIAC MARKERS ( 26 Nov 2018 17:47 )  <0.015 ng/mL / x     / x     / x     / x                                13.0   26.63 )-----------( 284      ( 27 Nov 2018 07:20 )             39.5     27 Nov 2018 07:20    142    |  105    |  12     ----------------------------<  135    3.8     |  30     |  0.67     Ca    8.2        27 Nov 2018 07:20  Mg     2.3       27 Nov 2018 07:20    TPro  7.2    /  Alb  2.4    /  TBili  0.3    /  DBili  x      /  AST  17     /  ALT  21     /  AlkPhos  85     27 Nov 2018 07:20    PT/INR - ( 26 Nov 2018 17:47 )   PT: 13.7 sec;   INR: 1.23 ratio         PTT - ( 26 Nov 2018 17:47 )  PTT:28.4 sec  CAPILLARY BLOOD GLUCOSE        LIVER FUNCTIONS - ( 27 Nov 2018 07:20 )  Alb: 2.4 g/dL / Pro: 7.2 gm/dL / ALK PHOS: 85 U/L / ALT: 21 U/L / AST: 17 U/L / GGT: x               MEDICATIONS  (STANDING):  ALBUTerol/ipratropium for Nebulization 3 milliLiter(s) Nebulizer every 6 hours  azithromycin  IVPB 500 milliGRAM(s) IV Intermittent every 24 hours  cefTRIAXone Injectable. 1000 milliGRAM(s) IV Push every 24 hours  enoxaparin Injectable 40 milliGRAM(s) SubCutaneous every 24 hours  methylPREDNISolone sodium succinate Injectable 40 milliGRAM(s) IV Push every 8 hours  sodium chloride 0.9%. 1000 milliLiter(s) (75 mL/Hr) IV Continuous <Continuous>    MEDICATIONS  (PRN):  acetaminophen   Tablet .. 650 milliGRAM(s) Oral every 6 hours PRN Temp greater or equal to 38C (100.4F), Mild Pain (1 - 3)  benzonatate 100 milliGRAM(s) Oral three times a day PRN Cough  docusate sodium 100 milliGRAM(s) Oral three times a day PRN Constipation  ondansetron Injectable 4 milliGRAM(s) IV Push every 6 hours PRN Nausea  senna 2 Tablet(s) Oral at bedtime PRN Constipation      RADIOLOGY & ADDITIONAL TESTS:  < from: CT Chest No Cont (11.26.18 @ 23:38) >  EXAM:  CT CHEST                            PROCEDURE DATE:  11/26/2018          INTERPRETATION:  CT CHEST    HISTORY:  Dyspnea     EXAM DATE/TIME:    11/26/2018 11:54 PM     CLINICAL HISTORY:    63 years old, male; Signs and symptoms; Cough     TECHNIQUE:    Axial computed tomography images of the chest without intravenous   contrast.    Coronal and sagittal reformatted images were created and reviewed.    MIP reconstructed images were created and reviewed.     COMPARISON:    CR XR CHEST [id number]/26/2018 5:43 PM , chest CT 7/15/2007    FINDINGS:    Lungs: Severe Centrilobular and paraseptal emphysema. Scattered   bibasilar tree in bud   opacities. Bilateral bronchial wall thickening. Saber-sheath   configuration of the trachea consistent with COPD. Retained secretions in   the trachea and mainstem bronchi.    -Stable 3 mm right middle lobe pulmonary nodule since 2007 (3; 73)  -8 mm cystic nodule with thickened wall in the superior segment left   lower lobe (3; 69), not previously seen.     Pleural space: Normal. No pneumothorax. No pleural effusion.    Heart: Normal. No cardiomegaly. No pericardial effusion. Coronary and   aortic valve calcification.   Aorta: Normal. No aortic aneurysm.    Lymph nodes:  Nonenlarged mediastinal lymph nodes.    Bones/joints: Diffuse osteopenia. Thoracic spine degenerative changes.   No   acute fracture.    Soft tissues: Unremarkable.     IMPRESSION:   1. severe Centrilobular and paraseptal emphysema.   2. Scattered tree in bud opacities. Bilateral bronchial wall thickening.   Findings compatible with nonspecific bronchiolitis.  3. 8 mm cystic nodule with thickened wall in the superior segment left   lower lobe. Six-month follow-up noncontrast chest CT is recommended.

## 2018-11-27 NOTE — PROGRESS NOTE ADULT - ASSESSMENT
64 y/o M PMHx significant for COPD, long-standing hx of tobacco use, and hepatitis C  admitted for:         1. Acute hypoxic Respiratory failure due to COPD exacerbation and Community acquired pneumonia.  Acute Bronchiolitis   - Pulse ox stable on NC, continue   - CT chest reviewed  - ABG reviewed   - RVP neg   - F/u BCXs   - send Legionella/Strep pneumo urine Ag  - C/w IV Ceftriaxone and  Azithromycin   - Duonebs   -  IV steroids, if still improving suggest start taper off in am   - Pulmonology eval         2.  Chronic hepatitis C  - not currently on treatment.   - LFTs WNL    3. DVT PPxs : On Lovenox

## 2018-11-27 NOTE — CONSULT NOTE ADULT - ASSESSMENT
1) Acute COPD Exacerbation  2) Dyspnea  3) Bronchiolitis  4) Abnormal CT Chest  5) Centrilobular Emphysema  6) Bullous Disease       62 y/o M PMHx significant for COPD, long-standing hx of tobacco use, and hepatitis C presents to the ED c/o shortness of breath, wheezing, and productive cough worse over the last 1-2 days.  Patient was scheduled for an appointment with me in the office but was too sick to come in and went to his primary care physician in one to the ER directly.  He was supposed to see me on December 5.  On examination he has diminished breath sounds bilaterally  At the present time he is on DuoNeb azithromycin and ceftriaxone and methylprednisolone 40 mg IV every 8 hours  Will continue this regimen for now  Reviewed ABG, shows no evidence of hypoxemia but will walk patient and if he desaturates, will need O2.  Recommend echocardiogram since PA is slightly enlarged on CT.  Will continue to follow

## 2018-11-27 NOTE — CONSULT NOTE ADULT - SUBJECTIVE AND OBJECTIVE BOX
Patient is a 63y old  Male who presents with a chief complaint of Shortness of Breath, Wheezing, and Cough (27 Nov 2018 08:49)      HPI:  64 y/o M PMHx significant for COPD, long-standing hx of tobacco use, and hepatitis C presents to the ED c/o shortness of breath, wheezing, and productive cough worse over the last 1-2 days. In triage VS => /min, RR 22/min. Labs => WBC 25, HCO3 34, Lactate 1.1, RVP (-). In the ED the patient was given Levofloxacin 500mg IVPB x 1, Magnesium sulfate 1g IVPB x 1, Methylprednisolone 125mg IVP x 1, NS x 1.5L, and Combivent nebs x 3. CT Chest => 1. Centrilobular and paraseptal emphysema. 2. Scattered tree in bud opacities. Bilateral bronchial wall thickening. Findings compatible with nonspecific bronchiolitis. (26 Nov 2018 22:41)      PAST MEDICAL & SURGICAL HISTORY:  COPD (chronic obstructive pulmonary disease)  Hepatitis C  No significant past surgical history      PREVIOUS DIAGNOSTIC TESTING:      MEDICATIONS  (STANDING):  ALBUTerol/ipratropium for Nebulization 3 milliLiter(s) Nebulizer every 6 hours  azithromycin  IVPB 500 milliGRAM(s) IV Intermittent every 24 hours  cefTRIAXone Injectable. 1000 milliGRAM(s) IV Push every 24 hours  enoxaparin Injectable 40 milliGRAM(s) SubCutaneous every 24 hours  methylPREDNISolone sodium succinate Injectable 40 milliGRAM(s) IV Push every 8 hours  sodium chloride 0.9%. 1000 milliLiter(s) (75 mL/Hr) IV Continuous <Continuous>    MEDICATIONS  (PRN):  acetaminophen   Tablet .. 650 milliGRAM(s) Oral every 6 hours PRN Temp greater or equal to 38C (100.4F), Mild Pain (1 - 3)  benzonatate 100 milliGRAM(s) Oral three times a day PRN Cough  docusate sodium 100 milliGRAM(s) Oral three times a day PRN Constipation  ondansetron Injectable 4 milliGRAM(s) IV Push every 6 hours PRN Nausea  senna 2 Tablet(s) Oral at bedtime PRN Constipation      FAMILY HISTORY:  Family history of diabetes mellitus (Father)      SOCIAL HISTORY:  ***    REVIEW OF SYSTEM:  cough, dyspnea, otherwise 12 point ROS negative     Vital Signs Last 24 Hrs  T(C): 36.8 (27 Nov 2018 11:36), Max: 37.3 (26 Nov 2018 16:56)  T(F): 98.2 (27 Nov 2018 11:36), Max: 99.2 (26 Nov 2018 16:56)  HR: 88 (27 Nov 2018 13:25) (70 - 131)  BP: 147/74 (27 Nov 2018 11:36) (100/72 - 157/81)  BP(mean): --  RR: 16 (27 Nov 2018 11:36) (16 - 28)  SpO2: 97% (27 Nov 2018 11:36) (82% - 100%)    I&O's Summary    PHYSICAL EXAM  General Appearance: cooperative, no acute distress,   HEENT: PERRL, conjunctiva clear, EOM's intact, non injected pharynx, no exudate, TM   normal  Neck: Supple, , no adenopathy, thyroid: not enlarged, no carotid bruit or JVD  Back: Symmetric, no  tenderness,no soft tissue tenderness  Lungs: Diminished bilaterally   Heart: Regular rate and rhythm, S1, S2 normal, no murmur, rub or gallop  Abdomen: Soft, non-tender, bowel sounds active , no hepatosplenomegaly  Extremities: no cyanosis or edema, no joint swelling  Skin: Skin color, texture normal, no rashes   Neurologic: Alert and oriented X3 , cranial nerves intact, sensory and motor normal,    ECG:    LABS:                          13.0   26.63 )-----------( 284      ( 27 Nov 2018 07:20 )             39.5     11-27    142  |  105  |  12  ----------------------------<  135<H>  3.8   |  30  |  0.67    Ca    8.2<L>      27 Nov 2018 07:20  Mg     2.3     11-27    TPro  7.2  /  Alb  2.4<L>  /  TBili  0.3  /  DBili  x   /  AST  17  /  ALT  21  /  AlkPhos  85  11-27    CARDIAC MARKERS ( 26 Nov 2018 17:47 )  <0.015 ng/mL / x     / x     / x     / x            Pro BNP  272 11-26 @ 17:47  D Dimer  -- 11-26 @ 17:47    PT/INR - ( 26 Nov 2018 17:47 )   PT: 13.7 sec;   INR: 1.23 ratio         PTT - ( 26 Nov 2018 17:47 )  PTT:28.4 sec    ABG - ( 27 Nov 2018 01:18 )  pH, Arterial: 7.46  pH, Blood: x     /  pCO2: 41    /  pO2: 71    / HCO3: 29    / Base Excess: 5.0   /  SaO2: 94                    RADIOLOGY & ADDITIONAL STUDIES:

## 2018-11-28 LAB
ANION GAP SERPL CALC-SCNC: 5 MMOL/L — SIGNIFICANT CHANGE UP (ref 5–17)
BUN SERPL-MCNC: 18 MG/DL — SIGNIFICANT CHANGE UP (ref 7–23)
CALCIUM SERPL-MCNC: 8 MG/DL — LOW (ref 8.5–10.1)
CHLORIDE SERPL-SCNC: 107 MMOL/L — SIGNIFICANT CHANGE UP (ref 96–108)
CO2 SERPL-SCNC: 28 MMOL/L — SIGNIFICANT CHANGE UP (ref 22–31)
CREAT SERPL-MCNC: 0.76 MG/DL — SIGNIFICANT CHANGE UP (ref 0.5–1.3)
GLUCOSE SERPL-MCNC: 199 MG/DL — HIGH (ref 70–99)
HCT VFR BLD CALC: 37.5 % — LOW (ref 39–50)
HGB BLD-MCNC: 12.1 G/DL — LOW (ref 13–17)
LEGIONELLA AG UR QL: NEGATIVE — SIGNIFICANT CHANGE UP
MCHC RBC-ENTMCNC: 31 PG — SIGNIFICANT CHANGE UP (ref 27–34)
MCHC RBC-ENTMCNC: 32.3 GM/DL — SIGNIFICANT CHANGE UP (ref 32–36)
MCV RBC AUTO: 96.2 FL — SIGNIFICANT CHANGE UP (ref 80–100)
NRBC # BLD: 0 /100 WBCS — SIGNIFICANT CHANGE UP (ref 0–0)
PLATELET # BLD AUTO: 294 K/UL — SIGNIFICANT CHANGE UP (ref 150–400)
POTASSIUM SERPL-MCNC: 3.6 MMOL/L — SIGNIFICANT CHANGE UP (ref 3.5–5.3)
POTASSIUM SERPL-SCNC: 3.6 MMOL/L — SIGNIFICANT CHANGE UP (ref 3.5–5.3)
RBC # BLD: 3.9 M/UL — LOW (ref 4.2–5.8)
RBC # FLD: 12.6 % — SIGNIFICANT CHANGE UP (ref 10.3–14.5)
SODIUM SERPL-SCNC: 140 MMOL/L — SIGNIFICANT CHANGE UP (ref 135–145)
WBC # BLD: 42.29 K/UL — CRITICAL HIGH (ref 3.8–10.5)
WBC # FLD AUTO: 42.29 K/UL — CRITICAL HIGH (ref 3.8–10.5)

## 2018-11-28 RX ADMIN — Medication 1 PATCH: at 11:00

## 2018-11-28 RX ADMIN — Medication 1 PATCH: at 19:00

## 2018-11-28 RX ADMIN — Medication 1 PATCH: at 17:24

## 2018-11-28 RX ADMIN — SODIUM CHLORIDE 75 MILLILITER(S): 9 INJECTION INTRAMUSCULAR; INTRAVENOUS; SUBCUTANEOUS at 07:40

## 2018-11-28 RX ADMIN — Medication 40 MILLIGRAM(S): at 05:17

## 2018-11-28 RX ADMIN — Medication 3 MILLILITER(S): at 13:57

## 2018-11-28 RX ADMIN — AZITHROMYCIN 250 MILLIGRAM(S): 500 TABLET, FILM COATED ORAL at 07:38

## 2018-11-28 RX ADMIN — Medication 3 MILLILITER(S): at 02:46

## 2018-11-28 RX ADMIN — Medication 1200 MILLIGRAM(S): at 16:42

## 2018-11-28 RX ADMIN — ENOXAPARIN SODIUM 40 MILLIGRAM(S): 100 INJECTION SUBCUTANEOUS at 07:38

## 2018-11-28 RX ADMIN — Medication 40 MILLIGRAM(S): at 16:42

## 2018-11-28 RX ADMIN — Medication 1 PATCH: at 11:03

## 2018-11-28 RX ADMIN — Medication 3 MILLILITER(S): at 19:37

## 2018-11-28 RX ADMIN — Medication 1200 MILLIGRAM(S): at 05:18

## 2018-11-28 RX ADMIN — Medication 3 MILLILITER(S): at 09:01

## 2018-11-28 RX ADMIN — CEFTRIAXONE 1000 MILLIGRAM(S): 500 INJECTION, POWDER, FOR SOLUTION INTRAMUSCULAR; INTRAVENOUS at 07:38

## 2018-11-28 NOTE — PROGRESS NOTE ADULT - SUBJECTIVE AND OBJECTIVE BOX
CC: Shortness of Breath, Wheezing, and Cough (27 Nov 2018 08:49)    HPI: 64 y/o M PMHx significant for COPD, long-standing hx of tobacco use, and hepatitis C presents to the ED c/o shortness of breath, wheezing, and productive cough worse over the last 1-2 days.  He reports that start feeling sick over 1 weeks, In triage VS => /min, RR 22/min. Labs => WBC 25, HCO3 34, Lactate 1.1, RVP (-). In the ED the patient was given Levofloxacin 500mg IVPB x 1, Magnesium sulfate 1g IVPB x 1, Methylprednisolone 125mg IVP x 1, NS x 1.5L, and Combivent nebs x 3. CT Chest => 1. Centrilobular and paraseptal emphysema. 2. Scattered tree in bud opacities. Bilateral bronchial wall thickening. Findings compatible with nonspecific bronchiolitis. (26 Nov 2018 22:41)    11/28: Feeling less SOB, no CP. Mild wheezing, eager to quit smoking, states nicotine patch helps.     ROS: neg unless stated above, + MCCURDY    Vital Signs Last 24 Hrs  T(C): 36.8 (28 Nov 2018 05:44), Max: 36.8 (27 Nov 2018 11:36)  T(F): 98.3 (28 Nov 2018 05:44), Max: 98.3 (28 Nov 2018 05:44)  HR: 89 (28 Nov 2018 09:01) (88 - 100)  BP: 145/76 (28 Nov 2018 05:44) (145/76 - 147/74)  BP(mean): --  RR: 19 (28 Nov 2018 05:44) (16 - 19)  SpO2: 96% (28 Nov 2018 05:44) (91% - 98%)    PHYSICAL EXAM:  General: Well developed;  in no acute distress middle aged male.   Eyes: PERRLA, EOMI; conjunctiva and sclera clear  Head: Normocephalic; atraumatic  ENMT: No nasal discharge; airway clear  Neck: Supple; no JVD   Respiratory: mild expiratory wheeze, no rhonchi or rales, mildly decreased BS  Cardiovascular: Regular rate and rhythm. S1 and S2 Normal; No murmurs  Gastrointestinal: Soft non-tender non-distended; Normal bowel sounds  Genitourinary: No costovertebral angle tenderness  Extremities: Normal range of motion, No edema, right forearm with mobile soft tissue mass likely lipoma, nontender.   Vascular: Peripheral pulses palpable 2+ bilaterally  Neurological: Alert and oriented x4, non focal   Skin: Warm and dry. No acute rash  Lymph Nodes: No acute cervical adenopathy  Musculoskeletal: Normal muscle tone, without deformities  Psychiatric: Cooperative and appropriate      LABS:   CARDIAC MARKERS ( 26 Nov 2018 17:47 )  <0.015 ng/mL / x     / x     / x     / x                              12.1   42.29 )-----------( 294      ( 28 Nov 2018 06:27 )             37.5     27 Nov 2018 07:20    142    |  105    |  12     ----------------------------<  135    3.8     |  30     |  0.67     Ca    8.2        27 Nov 2018 07:20  Mg     2.3       27 Nov 2018 07:20    TPro  7.2    /  Alb  2.4    /  TBili  0.3    /  DBili  x      /  AST  17     /  ALT  21     /  AlkPhos  85     27 Nov 2018 07:20  PT/INR - ( 26 Nov 2018 17:47 )   PT: 13.7 sec;   INR: 1.23 ratio    PTT - ( 26 Nov 2018 17:47 )  PTT:28.4 sec    MEDICATIONS  (STANDING):  ALBUTerol/ipratropium for Nebulization 3 milliLiter(s) Nebulizer every 6 hours  azithromycin  IVPB 500 milliGRAM(s) IV Intermittent every 24 hours  cefTRIAXone Injectable. 1000 milliGRAM(s) IV Push every 24 hours  enoxaparin Injectable 40 milliGRAM(s) SubCutaneous every 24 hours  guaiFENesin ER 1200 milliGRAM(s) Oral every 12 hours  methylPREDNISolone sodium succinate Injectable 40 milliGRAM(s) IV Push two times a day  nicotine - 21 mG/24Hr(s) Patch 1 patch Transdermal daily      RADIOLOGY & ADDITIONAL TESTS:  < from: CT Chest No Cont (11.26.18 @ 23:38) >  EXAM:  CT CHEST                        PROCEDURE DATE:  11/26/2018    INTERPRETATION:  CT CHEST    HISTORY:  Dyspnea     EXAM DATE/TIME:    11/26/2018 11:54 PM     CLINICAL HISTORY:    63 years old, male; Signs and symptoms; Cough     TECHNIQUE:    Axial computed tomography images of the chest without intravenous   contrast.    Coronal and sagittal reformatted images were created and reviewed.    MIP reconstructed images were created and reviewed.     COMPARISON:    CR XR CHEST [id number]/26/2018 5:43 PM , chest CT 7/15/2007    FINDINGS:    Lungs: Severe Centrilobular and paraseptal emphysema. Scattered   bibasilar tree in bud   opacities. Bilateral bronchial wall thickening. Saber-sheath   configuration of the trachea consistent with COPD. Retained secretions in   the trachea and mainstem bronchi.    -Stable 3 mm right middle lobe pulmonary nodule since 2007 (3; 73)  -8 mm cystic nodule with thickened wall in the superior segment left   lower lobe (3; 69), not previously seen.     Pleural space: Normal. No pneumothorax. No pleural effusion.    Heart: Normal. No cardiomegaly. No pericardial effusion. Coronary and   aortic valve calcification.   Aorta: Normal. No aortic aneurysm.    Lymph nodes:  Nonenlarged mediastinal lymph nodes.    Bones/joints: Diffuse osteopenia. Thoracic spine degenerative changes.   No   acute fracture.    Soft tissues: Unremarkable.     IMPRESSION:   1. severe Centrilobular and paraseptal emphysema.   2. Scattered tree in bud opacities. Bilateral bronchial wall thickening.   Findings compatible with nonspecific bronchiolitis.  3. 8 mm cystic nodule with thickened wall in the superior segment left   lower lobe. Six-month follow-up noncontrast chest CT is recommended.

## 2018-11-28 NOTE — PROGRESS NOTE ADULT - ASSESSMENT
1) Acute COPD Exacerbation  2) Dyspnea  3) Bronchiolitis  4) Abnormal CT Chest  5) Centrilobular Emphysema  6) Bullous Disease       62 y/o M PMHx significant for COPD, long-standing hx of tobacco use, and hepatitis C presents to the ED c/o shortness of breath, wheezing, and productive cough worse over the last 1-2 days.  Patient was scheduled for an appointment with me in the office but was too sick to come in and went to his primary care physician in one to the ER directly.  He was supposed to see me on December 5.  On examination he has diminished breath sounds bilaterally  At the present time he is on DuoNeb azithromycin and ceftriaxone and methylprednisolone 40 mg IV every 8 hours  Will continue this regimen for now  Reviewed ABG, shows no evidence of hypoxemia but will walk patient and if he desaturates, will need O2.  Recommend echocardiogram since PA is slightly enlarged on CT.  Will continue to monitor, overall respiratory status is improving    Will continue to follow

## 2018-11-28 NOTE — PROGRESS NOTE ADULT - SUBJECTIVE AND OBJECTIVE BOX
Patient is a 63y old  Male who presents with a chief complaint of Shortness of Breath, Wheezing, and Cough (27 Nov 2018 08:49)      HPI:  62 y/o M PMHx significant for COPD, long-standing hx of tobacco use, and hepatitis C presents to the ED c/o shortness of breath, wheezing, and productive cough worse over the last 1-2 days. In triage VS => /min, RR 22/min. Labs => WBC 25, HCO3 34, Lactate 1.1, RVP (-). In the ED the patient was given Levofloxacin 500mg IVPB x 1, Magnesium sulfate 1g IVPB x 1, Methylprednisolone 125mg IVP x 1, NS x 1.5L, and Combivent nebs x 3. CT Chest => 1. Centrilobular and paraseptal emphysema. 2. Scattered tree in bud opacities. Bilateral bronchial wall thickening. Findings compatible with nonspecific bronchiolitis. (26 Nov 2018 22:41)  PAST MEDICAL & SURGICAL HISTORY:  COPD (chronic obstructive pulmonary disease)  Hepatitis C  No significant past surgical history    11/28/18  No acute pulmonary events occurred overnight     PREVIOUS DIAGNOSTIC TESTING:      MEDICATIONS  (STANDING):  ALBUTerol/ipratropium for Nebulization 3 milliLiter(s) Nebulizer every 6 hours  azithromycin  IVPB 500 milliGRAM(s) IV Intermittent every 24 hours  cefTRIAXone Injectable. 1000 milliGRAM(s) IV Push every 24 hours  enoxaparin Injectable 40 milliGRAM(s) SubCutaneous every 24 hours  guaiFENesin ER 1200 milliGRAM(s) Oral every 12 hours  methylPREDNISolone sodium succinate Injectable 40 milliGRAM(s) IV Push two times a day  nicotine - 21 mG/24Hr(s) Patch 1 patch Transdermal daily    MEDICATIONS  (PRN):  acetaminophen   Tablet .. 650 milliGRAM(s) Oral every 6 hours PRN Temp greater or equal to 38C (100.4F), Mild Pain (1 - 3)  benzonatate 100 milliGRAM(s) Oral three times a day PRN Cough  docusate sodium 100 milliGRAM(s) Oral three times a day PRN Constipation  ondansetron Injectable 4 milliGRAM(s) IV Push every 6 hours PRN Nausea  senna 2 Tablet(s) Oral at bedtime PRN Constipation        FAMILY HISTORY:  Family history of diabetes mellitus (Father)      SOCIAL HISTORY:  ***    REVIEW OF SYSTEM:  cough, dyspnea, otherwise 12 point ROS negative     Vital Signs Last 24 Hrs  T(C): 36.7 (28 Nov 2018 10:50), Max: 36.8 (28 Nov 2018 05:44)  T(F): 98 (28 Nov 2018 10:50), Max: 98.3 (28 Nov 2018 05:44)  HR: 84 (28 Nov 2018 13:57) (84 - 100)  BP: 114/61 (28 Nov 2018 10:50) (114/61 - 145/77)  BP(mean): --  RR: 18 (28 Nov 2018 10:50) (17 - 19)  SpO2: 97% (28 Nov 2018 10:50) (91% - 98%)    I&O's Summary    PHYSICAL EXAM  General Appearance: cooperative, no acute distress,   HEENT: PERRL, conjunctiva clear, EOM's intact, non injected pharynx, no exudate, TM   normal  Neck: Supple, , no adenopathy, thyroid: not enlarged, no carotid bruit or JVD  Back: Symmetric, no  tenderness,no soft tissue tenderness  Lungs: Diminished bilaterally   Heart: Regular rate and rhythm, S1, S2 normal, no murmur, rub or gallop  Abdomen: Soft, non-tender, bowel sounds active , no hepatosplenomegaly  Extremities: no cyanosis or edema, no joint swelling  Skin: Skin color, texture normal, no rashes   Neurologic: Alert and oriented X3 , cranial nerves intact, sensory and motor normal,    ECG:    LABS:                          13.0   26.63 )-----------( 284      ( 27 Nov 2018 07:20 )             39.5     11-27    142  |  105  |  12  ----------------------------<  135<H>  3.8   |  30  |  0.67    Ca    8.2<L>      27 Nov 2018 07:20  Mg     2.3     11-27    TPro  7.2  /  Alb  2.4<L>  /  TBili  0.3  /  DBili  x   /  AST  17  /  ALT  21  /  AlkPhos  85  11-27    CARDIAC MARKERS ( 26 Nov 2018 17:47 )  <0.015 ng/mL / x     / x     / x     / x            Pro BNP  272 11-26 @ 17:47  D Dimer  -- 11-26 @ 17:47    PT/INR - ( 26 Nov 2018 17:47 )   PT: 13.7 sec;   INR: 1.23 ratio         PTT - ( 26 Nov 2018 17:47 )  PTT:28.4 sec    ABG - ( 27 Nov 2018 01:18 )  pH, Arterial: 7.46  pH, Blood: x     /  pCO2: 41    /  pO2: 71    / HCO3: 29    / Base Excess: 5.0   /  SaO2: 94                    RADIOLOGY & ADDITIONAL STUDIES:

## 2018-11-28 NOTE — PROGRESS NOTE ADULT - ASSESSMENT
62 y/o M PMHx significant for COPD, long-standing hx of tobacco use, and hepatitis C  admitted for:     1. Acute hypoxic Respiratory failure due to COPD exacerbation and Community acquired pneumonia.  Acute Bronchiolitis   - Pulse ox stable on NC, continue   - CT chest reviewed  - ABG reviewed   - RVP neg   - F/u BCXs   - send Legionella/Strep pneumo urine Ag  - C/w IV Ceftriaxone and  Azithromycin   - Duonebs   -  IV steroids, if still improving suggest start taper off in am   - Pulmonology eval     2.  Chronic hepatitis C  - not currently on treatment.   - LFTs WNL    3. DVT PPxs : On Lovenox 62 y/o M PMHx significant for COPD, long-standing hx of tobacco use, and hepatitis C  admitted for:     1. Acute hypoxic Respiratory failure due to COPD exacerbation and Community acquired pneumonia.  Acute Bronchiolitis   - WILL CHECK AMBULATORY PULSE OX TODAY.   - CT chest reviewed: pulmonary nodule 8mm cystic LLL --> RECOMMEND 6 month f/u repeat CT Chest.   - ABG reviewed   - RVP neg   - neg. BCXs   - send Legionella/Strep pneumo urine Ag  - C/w IV Ceftriaxone and  Azithromycin day #2.   - Duonebs   -  will taper to IV Solumedrol BID today with transition to PO taper and possible dc home tomorrow.   - Pulmonology eval appreciated.     2.  Chronic hepatitis C  - not currently on treatment.   - LFTs WNL    3. DVT PPxs : On Lovenox     Dispo: remain inpatient on IV abx, steroid taper with possible dc home tomorrow. Checking Ambulatory pulse ox today.   Discussed on IDR .

## 2018-11-28 NOTE — CDI QUERY NOTE - NSCDIOTHERTXTBX_GEN_ALL_CORE_HH
62 y/o M PMHx significant for COPD, long-standing hx of tobacco use, and hepatitis C  admitted for: 1. Acute hypoxic Respiratory failure due to COPD exacerbation and Community acquired pneumonia.  Acute Bronchiolitis, in addition to the following abnormal  values      WBC Count:   25.00,     Heart Rate (beats/min): 123 /min  Respiration Rate (breaths/min): 22 /min  Respiration Rate (breaths/min): 27 /min    Respiratory Pre/Post Treatment Assessment  SpO2 (%) SpO2 (%): 82 %  O2 delivery Patient On: room air    Antibiotics:   azithromycin  IVPB   250 mL/Hr IV Intermittent (11-28-18 @ 07:38)   250 mL/Hr IV Intermittent (11-27-18 @ 08:32)    cefTRIAXone Injectable.   1000 milliGRAM(s) IV Push (11-28-18 @ 07:38)   1000 milliGRAM(s) IV Push (11-27-18 @ 08:32)    levoFLOXacin IVPB   100 mL/Hr IV Intermittent (11-26-18 @ 18:43)        Please clarify if the following diagnosis is likely, probable or suspected      A) Sepsis ruled in  B) Sepsis is resolving  C) Sepsis ruled out  D) Early Sepsis POA, resolved?  E) Other , please clarify  F) Clinically insignificant

## 2018-11-29 LAB
HCT VFR BLD CALC: 38.5 % — LOW (ref 39–50)
HGB BLD-MCNC: 12.3 G/DL — LOW (ref 13–17)
MCHC RBC-ENTMCNC: 30.9 PG — SIGNIFICANT CHANGE UP (ref 27–34)
MCHC RBC-ENTMCNC: 31.9 GM/DL — LOW (ref 32–36)
MCV RBC AUTO: 96.7 FL — SIGNIFICANT CHANGE UP (ref 80–100)
NRBC # BLD: 0 /100 WBCS — SIGNIFICANT CHANGE UP (ref 0–0)
PLATELET # BLD AUTO: 282 K/UL — SIGNIFICANT CHANGE UP (ref 150–400)
RBC # BLD: 3.98 M/UL — LOW (ref 4.2–5.8)
RBC # FLD: 12.6 % — SIGNIFICANT CHANGE UP (ref 10.3–14.5)
S PNEUM AG UR QL: NEGATIVE — SIGNIFICANT CHANGE UP
WBC # BLD: 33.62 K/UL — HIGH (ref 3.8–10.5)
WBC # FLD AUTO: 33.62 K/UL — HIGH (ref 3.8–10.5)

## 2018-11-29 PROCEDURE — 71045 X-RAY EXAM CHEST 1 VIEW: CPT | Mod: 26

## 2018-11-29 RX ADMIN — Medication 1 PATCH: at 11:15

## 2018-11-29 RX ADMIN — Medication 3 MILLILITER(S): at 02:08

## 2018-11-29 RX ADMIN — Medication 3 MILLILITER(S): at 19:55

## 2018-11-29 RX ADMIN — Medication 1200 MILLIGRAM(S): at 17:32

## 2018-11-29 RX ADMIN — AZITHROMYCIN 250 MILLIGRAM(S): 500 TABLET, FILM COATED ORAL at 08:43

## 2018-11-29 RX ADMIN — Medication 100 MILLIGRAM(S): at 05:04

## 2018-11-29 RX ADMIN — Medication 40 MILLIGRAM(S): at 17:32

## 2018-11-29 RX ADMIN — Medication 1200 MILLIGRAM(S): at 05:04

## 2018-11-29 RX ADMIN — Medication 1 PATCH: at 11:20

## 2018-11-29 RX ADMIN — Medication 3 MILLILITER(S): at 13:42

## 2018-11-29 RX ADMIN — Medication 40 MILLIGRAM(S): at 05:04

## 2018-11-29 RX ADMIN — CEFTRIAXONE 1000 MILLIGRAM(S): 500 INJECTION, POWDER, FOR SOLUTION INTRAMUSCULAR; INTRAVENOUS at 08:43

## 2018-11-29 RX ADMIN — Medication 3 MILLILITER(S): at 07:42

## 2018-11-29 RX ADMIN — ENOXAPARIN SODIUM 40 MILLIGRAM(S): 100 INJECTION SUBCUTANEOUS at 08:44

## 2018-11-29 RX ADMIN — Medication 1 PATCH: at 19:39

## 2018-11-29 NOTE — PROGRESS NOTE ADULT - SUBJECTIVE AND OBJECTIVE BOX
Patient is a 63y old  Male who presents with a chief complaint of Shortness of Breath, Wheezing, and Cough (27 Nov 2018 08:49)      HPI:  62 y/o M PMHx significant for COPD, long-standing hx of tobacco use, and hepatitis C presents to the ED c/o shortness of breath, wheezing, and productive cough worse over the last 1-2 days. In triage VS => /min, RR 22/min. Labs => WBC 25, HCO3 34, Lactate 1.1, RVP (-). In the ED the patient was given Levofloxacin 500mg IVPB x 1, Magnesium sulfate 1g IVPB x 1, Methylprednisolone 125mg IVP x 1, NS x 1.5L, and Combivent nebs x 3. CT Chest => 1. Centrilobular and paraseptal emphysema. 2. Scattered tree in bud opacities. Bilateral bronchial wall thickening. Findings compatible with nonspecific bronchiolitis. (26 Nov 2018 22:41)  PAST MEDICAL & SURGICAL HISTORY:  COPD (chronic obstructive pulmonary disease)  Hepatitis C  No significant past surgical history    11/29/18  No acute pulmonary events occurred overnight     PREVIOUS DIAGNOSTIC TESTING:      MEDICATIONS  (STANDING):  ALBUTerol/ipratropium for Nebulization 3 milliLiter(s) Nebulizer every 6 hours  azithromycin  IVPB 500 milliGRAM(s) IV Intermittent every 24 hours  cefTRIAXone Injectable. 1000 milliGRAM(s) IV Push every 24 hours  enoxaparin Injectable 40 milliGRAM(s) SubCutaneous every 24 hours  guaiFENesin ER 1200 milliGRAM(s) Oral every 12 hours  methylPREDNISolone sodium succinate Injectable 40 milliGRAM(s) IV Push two times a day  nicotine - 21 mG/24Hr(s) Patch 1 patch Transdermal daily    MEDICATIONS  (PRN):  acetaminophen   Tablet .. 650 milliGRAM(s) Oral every 6 hours PRN Temp greater or equal to 38C (100.4F), Mild Pain (1 - 3)  benzonatate 100 milliGRAM(s) Oral three times a day PRN Cough  docusate sodium 100 milliGRAM(s) Oral three times a day PRN Constipation  ondansetron Injectable 4 milliGRAM(s) IV Push every 6 hours PRN Nausea  senna 2 Tablet(s) Oral at bedtime PRN Constipation        FAMILY HISTORY:  Family history of diabetes mellitus (Father)      SOCIAL HISTORY:  ***    REVIEW OF SYSTEM:  cough, dyspnea, otherwise 12 point ROS negative     Vital Signs Last 24 Hrs  T(C): 36.7 (28 Nov 2018 10:50), Max: 36.8 (28 Nov 2018 05:44)  T(F): 98 (28 Nov 2018 10:50), Max: 98.3 (28 Nov 2018 05:44)  HR: 84 (28 Nov 2018 13:57) (84 - 100)  BP: 114/61 (28 Nov 2018 10:50) (114/61 - 145/77)  BP(mean): --  RR: 18 (28 Nov 2018 10:50) (17 - 19)  SpO2: 97% (28 Nov 2018 10:50) (91% - 98%)    I&O's Summary    PHYSICAL EXAM  General Appearance: cooperative, no acute distress,   HEENT: PERRL, conjunctiva clear, EOM's intact, non injected pharynx, no exudate, TM   normal  Neck: Supple, , no adenopathy, thyroid: not enlarged, no carotid bruit or JVD  Back: Symmetric, no  tenderness,no soft tissue tenderness  Lungs: Diminished bilaterally   Heart: Regular rate and rhythm, S1, S2 normal, no murmur, rub or gallop  Abdomen: Soft, non-tender, bowel sounds active , no hepatosplenomegaly  Extremities: no cyanosis or edema, no joint swelling  Skin: Skin color, texture normal, no rashes   Neurologic: Alert and oriented X3 , cranial nerves intact, sensory and motor normal,    ECG:    LABS:                          13.0   26.63 )-----------( 284      ( 27 Nov 2018 07:20 )             39.5     11-27    142  |  105  |  12  ----------------------------<  135<H>  3.8   |  30  |  0.67    Ca    8.2<L>      27 Nov 2018 07:20  Mg     2.3     11-27    TPro  7.2  /  Alb  2.4<L>  /  TBili  0.3  /  DBili  x   /  AST  17  /  ALT  21  /  AlkPhos  85  11-27    CARDIAC MARKERS ( 26 Nov 2018 17:47 )  <0.015 ng/mL / x     / x     / x     / x            Pro BNP  272 11-26 @ 17:47  D Dimer  -- 11-26 @ 17:47    PT/INR - ( 26 Nov 2018 17:47 )   PT: 13.7 sec;   INR: 1.23 ratio         PTT - ( 26 Nov 2018 17:47 )  PTT:28.4 sec    ABG - ( 27 Nov 2018 01:18 )  pH, Arterial: 7.46  pH, Blood: x     /  pCO2: 41    /  pO2: 71    / HCO3: 29    / Base Excess: 5.0   /  SaO2: 94                    RADIOLOGY & ADDITIONAL STUDIES:

## 2018-11-29 NOTE — PROGRESS NOTE ADULT - SUBJECTIVE AND OBJECTIVE BOX
CC: Shortness of Breath, Wheezing, and Cough (27 Nov 2018 08:49)    HPI: 62 y/o M PMHx significant for COPD, long-standing hx of tobacco use, and hepatitis C presents to the ED c/o shortness of breath, wheezing, and productive cough worse over the last 1-2 days.  He reports that start feeling sick over 1 weeks, In triage VS => /min, RR 22/min. Labs => WBC 25, HCO3 34, Lactate 1.1, RVP (-). In the ED the patient was given Levofloxacin 500mg IVPB x 1, Magnesium sulfate 1g IVPB x 1, Methylprednisolone 125mg IVP x 1, NS x 1.5L, and Combivent nebs x 3. CT Chest => 1. Centrilobular and paraseptal emphysema. 2. Scattered tree in bud opacities. Bilateral bronchial wall thickening. Findings compatible with nonspecific bronchiolitis.     11/29: Pt was seen and examined, reports  slowly improving, but still SOB. No fevers. POC discussed       ROS: neg unless stated above, + SOB      Vital Signs Last 24 Hrs  T(C): 36.8 (29 Nov 2018 10:50), Max: 36.8 (29 Nov 2018 10:50)  T(F): 98.2 (29 Nov 2018 10:50), Max: 98.2 (29 Nov 2018 10:50)  HR: 78 (29 Nov 2018 13:40) (78 - 93)  BP: 124/63 (29 Nov 2018 10:50) (124/63 - 131/70)  RR: 18 (29 Nov 2018 10:50) (18 - 18)  SpO2: 96% (29 Nov 2018 10:50) (95% - 97%)    PHYSICAL EXAM:  General: Well developed;  mildly dyspneic on conversation  Eyes: PERRLA, EOMI; conjunctiva and sclera clear  Head: Normocephalic; atraumatic  ENMT: No nasal discharge; airway clear  Neck: Supple; no JVD   Respiratory: mild expiratory wheeze, no rhonchi or rales, decreased BS  Cardiovascular: Regular rate and rhythm. S1 and S2 Normal; No murmurs  Gastrointestinal: Soft non-tender non-distended; Normal bowel sounds  Genitourinary: No costovertebral angle tenderness  Extremities: Normal range of motion, No edema, right forearm with mobile soft tissue mass likely lipoma, nontender.   Vascular: Peripheral pulses palpable 2+ bilaterally  Neurological: Alert and oriented x4, non focal   Skin: Warm and dry. No acute rash  Lymph Nodes: No acute cervical adenopathy  Musculoskeletal: Normal muscle tone, without deformities  Psychiatric: Cooperative and appropriate      LABS:                         12.3   33.62 )-----------( 282      ( 29 Nov 2018 06:39 )             38.5     11-28    140  |  107  |  18  ----------------------------<  199<H>  3.6   |  28  |  0.76    Ca    8.0<L>      28 Nov 2018 06:27        CARDIAC MARKERS ( 26 Nov 2018 17:47 )  <0.015 ng/mL / x     / x     / x     / x                              12.1   42.29 )-----------( 294      ( 28 Nov 2018 06:27 )             37.5     27 Nov 2018 07:20    142    |  105    |  12     ----------------------------<  135    3.8     |  30     |  0.67     Ca    8.2        27 Nov 2018 07:20  Mg     2.3       27 Nov 2018 07:20    TPro  7.2    /  Alb  2.4    /  TBili  0.3    /  DBili  x      /  AST  17     /  ALT  21     /  AlkPhos  85     27 Nov 2018 07:20  PT/INR - ( 26 Nov 2018 17:47 )   PT: 13.7 sec;   INR: 1.23 ratio    PTT - ( 26 Nov 2018 17:47 )  PTT:28.4 sec      MEDICATIONS  (STANDING):  ALBUTerol/ipratropium for Nebulization 3 milliLiter(s) Nebulizer every 6 hours  azithromycin  IVPB 500 milliGRAM(s) IV Intermittent every 24 hours  cefTRIAXone Injectable. 1000 milliGRAM(s) IV Push every 24 hours  enoxaparin Injectable 40 milliGRAM(s) SubCutaneous every 24 hours  guaiFENesin ER 1200 milliGRAM(s) Oral every 12 hours  methylPREDNISolone sodium succinate Injectable 40 milliGRAM(s) IV Push two times a day  nicotine - 21 mG/24Hr(s) Patch 1 patch Transdermal daily    MEDICATIONS  (PRN):  acetaminophen   Tablet .. 650 milliGRAM(s) Oral every 6 hours PRN Temp greater or equal to 38C (100.4F), Mild Pain (1 - 3)  benzonatate 100 milliGRAM(s) Oral three times a day PRN Cough  docusate sodium 100 milliGRAM(s) Oral three times a day PRN Constipation  ondansetron Injectable 4 milliGRAM(s) IV Push every 6 hours PRN Nausea  senna 2 Tablet(s) Oral at bedtime PRN Constipation      RADIOLOGY & ADDITIONAL TESTS:    EXAM:  CT CHEST                        PROCEDURE DATE:  11/26/2018    INTERPRETATION:  CT CHEST    FINDINGS:    Lungs: Severe Centrilobular and paraseptal emphysema. Scattered   bibasilar tree in bud   opacities. Bilateral bronchial wall thickening. Saber-sheath   configuration of the trachea consistent with COPD. Retained secretions in   the trachea and mainstem bronchi.    -Stable 3 mm right middle lobe pulmonary nodule since 2007 (3; 73)  -8 mm cystic nodule with thickened wall in the superior segment left   lower lobe (3; 69), not previously seen.     Pleural space: Normal. No pneumothorax. No pleural effusion.    Heart: Normal. No cardiomegaly. No pericardial effusion. Coronary and   aortic valve calcification.   Aorta: Normal. No aortic aneurysm.    Lymph nodes:  Nonenlarged mediastinal lymph nodes.    Bones/joints: Diffuse osteopenia. Thoracic spine degenerative changes.   No   acute fracture.    Soft tissues: Unremarkable.     IMPRESSION:   1. severe Centrilobular and paraseptal emphysema.   2. Scattered tree in bud opacities. Bilateral bronchial wall thickening.   Findings compatible with nonspecific bronchiolitis.  3. 8 mm cystic nodule with thickened wall in the superior segment left   lower lobe. Six-month follow-up noncontrast chest CT is recommended.

## 2018-11-29 NOTE — PHARMACOTHERAPY INTERVENTION NOTE - COMMENTS
[facility] patient. Med Rec completed on admission. Will also do discharge counseling for new medications PN BUNDLE. [facility] patient. Med Rec completed on admission. Will also do discharge counseling for new medications

## 2018-11-29 NOTE — PROGRESS NOTE ADULT - ASSESSMENT
64 y/o M PMHx significant for COPD, long-standing hx of tobacco use, and hepatitis C  admitted for:     1. Acute hypoxic Respiratory failure due to COPD exacerbation and Community acquired pneumonia.  Acute Bronchiolitis   - clinically better   - CT chest reviewed: pulmonary nodule 8mm cystic LLL --> RECOMMEND 6 month f/u repeat CT Chest.   - ABG reviewed   - RVP neg   - neg. BCXs   - send Legionella/Strep pneumo urine Ag  - C/w IV Ceftriaxone and  Azithromycin day #3.   - Duonebs   - C/w  IV Solumedrol BID,   - Repeat CXR     2.  Chronic hepatitis C  - not currently on treatment.   - LFTs WNL    3.  Leukocytosis, initially likely due to PNA  - still elevated likely due to steroids  - Monitor for fevers  - labs in am     4. DVT PPxs : On Lovenox     Dispo: remain inpatient on IV abx, and steroids,  Check pulse ox on ambulation,  reeval in am for possible d/c   Case discussed with team  on IDR

## 2018-11-29 NOTE — PROGRESS NOTE ADULT - ASSESSMENT
1) Acute COPD Exacerbation  2) Dyspnea  3) Bronchiolitis  4) Abnormal CT Chest  5) Centrilobular Emphysema  6) Bullous Disease       64 y/o M PMHx significant for COPD, long-standing hx of tobacco use, and hepatitis C presents to the ED c/o shortness of breath, wheezing, and productive cough worse over the last 1-2 days.  Patient was scheduled for an appointment with me in the office but was too sick to come in and went to his primary care physician in one to the ER directly.  He was supposed to see me on December 5.  On examination he has diminished breath sounds bilaterally  At the present time he is on DuoNeb azithromycin and ceftriaxone and methylprednisolone 40 mg IV every 8 hours  Will continue this regimen for now  Reviewed ABG, shows no evidence of hypoxemia but will walk patient and if he desaturates, will need O2.  Recommend echocardiogram since PA is slightly enlarged on CT.  Will continue to monitor, overall respiratory status is improving    Will continue to follow

## 2018-11-30 ENCOUNTER — TRANSCRIPTION ENCOUNTER (OUTPATIENT)
Age: 63
End: 2018-11-30

## 2018-11-30 LAB
BASOPHILS # BLD AUTO: 0 K/UL — SIGNIFICANT CHANGE UP (ref 0–0.2)
BASOPHILS NFR BLD AUTO: 0 % — SIGNIFICANT CHANGE UP (ref 0–2)
EOSINOPHIL # BLD AUTO: 0 K/UL — SIGNIFICANT CHANGE UP (ref 0–0.5)
EOSINOPHIL NFR BLD AUTO: 0 % — SIGNIFICANT CHANGE UP (ref 0–6)
HCT VFR BLD CALC: 38.8 % — LOW (ref 39–50)
HGB BLD-MCNC: 12.6 G/DL — LOW (ref 13–17)
LYMPHOCYTES # BLD AUTO: 0.24 K/UL — LOW (ref 1–3.3)
LYMPHOCYTES # BLD AUTO: 1 % — LOW (ref 13–44)
MCHC RBC-ENTMCNC: 30.6 PG — SIGNIFICANT CHANGE UP (ref 27–34)
MCHC RBC-ENTMCNC: 32.5 GM/DL — SIGNIFICANT CHANGE UP (ref 32–36)
MCV RBC AUTO: 94.2 FL — SIGNIFICANT CHANGE UP (ref 80–100)
MONOCYTES # BLD AUTO: 1.42 K/UL — HIGH (ref 0–0.9)
MONOCYTES NFR BLD AUTO: 6 % — SIGNIFICANT CHANGE UP (ref 2–14)
NEUTROPHILS # BLD AUTO: 22.05 K/UL — HIGH (ref 1.8–7.4)
NEUTROPHILS NFR BLD AUTO: 93 % — HIGH (ref 43–77)
NRBC # BLD: 0 /100 — SIGNIFICANT CHANGE UP (ref 0–0)
NRBC # BLD: SIGNIFICANT CHANGE UP /100 WBCS (ref 0–0)
PLAT MORPH BLD: NORMAL — SIGNIFICANT CHANGE UP
PLATELET # BLD AUTO: 301 K/UL — SIGNIFICANT CHANGE UP (ref 150–400)
RBC # BLD: 4.12 M/UL — LOW (ref 4.2–5.8)
RBC # FLD: 12.8 % — SIGNIFICANT CHANGE UP (ref 10.3–14.5)
RBC BLD AUTO: NORMAL — SIGNIFICANT CHANGE UP
WBC # BLD: 23.71 K/UL — HIGH (ref 3.8–10.5)
WBC # FLD AUTO: 23.71 K/UL — HIGH (ref 3.8–10.5)

## 2018-11-30 RX ORDER — CEFUROXIME AXETIL 250 MG
1 TABLET ORAL
Qty: 10 | Refills: 0 | OUTPATIENT
Start: 2018-11-30 | End: 2018-12-04

## 2018-11-30 RX ORDER — AZITHROMYCIN 500 MG/1
1 TABLET, FILM COATED ORAL
Qty: 1 | Refills: 0 | OUTPATIENT
Start: 2018-11-30 | End: 2018-11-30

## 2018-11-30 RX ORDER — TIOTROPIUM BROMIDE 18 UG/1
1 CAPSULE ORAL; RESPIRATORY (INHALATION) DAILY
Qty: 0 | Refills: 0 | Status: DISCONTINUED | OUTPATIENT
Start: 2018-11-30 | End: 2018-12-05

## 2018-11-30 RX ORDER — AZITHROMYCIN 500 MG/1
500 TABLET, FILM COATED ORAL ONCE
Qty: 0 | Refills: 0 | Status: COMPLETED | OUTPATIENT
Start: 2018-12-01 | End: 2018-12-01

## 2018-11-30 RX ORDER — CEFUROXIME AXETIL 250 MG
500 TABLET ORAL EVERY 12 HOURS
Qty: 0 | Refills: 0 | Status: DISCONTINUED | OUTPATIENT
Start: 2018-12-01 | End: 2018-12-05

## 2018-11-30 RX ORDER — BUDESONIDE AND FORMOTEROL FUMARATE DIHYDRATE 160; 4.5 UG/1; UG/1
2 AEROSOL RESPIRATORY (INHALATION)
Qty: 1 | Refills: 0
Start: 2018-11-30 | End: 2018-12-29

## 2018-11-30 RX ORDER — TIOTROPIUM BROMIDE 18 UG/1
1 CAPSULE ORAL; RESPIRATORY (INHALATION)
Qty: 1 | Refills: 0
Start: 2018-11-30 | End: 2018-12-29

## 2018-11-30 RX ORDER — NICOTINE POLACRILEX 2 MG
1 GUM BUCCAL
Qty: 4 | Refills: 0
Start: 2018-11-30 | End: 2018-12-27

## 2018-11-30 RX ORDER — ALBUTEROL 90 UG/1
2 AEROSOL, METERED ORAL
Qty: 1 | Refills: 0
Start: 2018-11-30 | End: 2018-12-29

## 2018-11-30 RX ADMIN — ENOXAPARIN SODIUM 40 MILLIGRAM(S): 100 INJECTION SUBCUTANEOUS at 08:42

## 2018-11-30 RX ADMIN — Medication 3 MILLILITER(S): at 08:29

## 2018-11-30 RX ADMIN — Medication 1200 MILLIGRAM(S): at 05:28

## 2018-11-30 RX ADMIN — Medication 3 MILLILITER(S): at 01:44

## 2018-11-30 RX ADMIN — AZITHROMYCIN 250 MILLIGRAM(S): 500 TABLET, FILM COATED ORAL at 08:41

## 2018-11-30 RX ADMIN — Medication 1 PATCH: at 08:57

## 2018-11-30 RX ADMIN — Medication 40 MILLIGRAM(S): at 05:27

## 2018-11-30 RX ADMIN — Medication 3 MILLILITER(S): at 19:38

## 2018-11-30 RX ADMIN — Medication 3 MILLILITER(S): at 14:18

## 2018-11-30 RX ADMIN — Medication 1 PATCH: at 08:55

## 2018-11-30 RX ADMIN — Medication 1200 MILLIGRAM(S): at 17:34

## 2018-11-30 RX ADMIN — CEFTRIAXONE 1000 MILLIGRAM(S): 500 INJECTION, POWDER, FOR SOLUTION INTRAMUSCULAR; INTRAVENOUS at 08:41

## 2018-11-30 NOTE — PHYSICAL THERAPY INITIAL EVALUATION ADULT - DIAGNOSIS, PT EVAL
Acute hypoxic Respiratory failure due to COPD exacerbation and Community acquired pneumonia.  Acute Bronchiolitis .

## 2018-11-30 NOTE — PROGRESS NOTE ADULT - SUBJECTIVE AND OBJECTIVE BOX
62 y/o M PMHx significant for COPD, long-standing hx of tobacco use, and hepatitis C presented  to the ED c/o shortness of breath, wheezing, and productive cough worse over the last 1-2 days PTA.  He  start feeling sick over 1 weeks before ER visit.    In triage VS => /min, RR 22/min. Labs => WBC 25, HCO3 34, Lactate 1.1, RVP (-). In the ED the patient was given Levofloxacin 500mg IVPB x 1, Magnesium sulfate 1g IVPB x 1, Methylprednisolone 125mg IVP x 1, NS x 1.5L, and Combivent nebs x 3. CT Chest => 1. Centrilobular and paraseptal emphysema. 2. Scattered tree in bud opacities. Bilateral bronchial wall thickening. Findings compatible with nonspecific bronchiolitis.  Pt was admitted to the hospital. Was evaluated by Pulm . Clinically improved on IV Abxs and Steroids. Today reports SOB and cough improving, ambulated in the hallway off O2 with stable pulse ox, no significant SOB. D/c planning, meds and outPt f/u discussed. Pt was planned for d/c to shelter, but as per HELENA/CM did not get answer  from facility, will have to hold d/c until Monday     Vital Signs Last 24 Hrs  T(C): 36.4 (30 Nov 2018 10:59), Max: 37 (29 Nov 2018 20:31)  T(F): 97.6 (30 Nov 2018 10:59), Max: 98.6 (29 Nov 2018 20:31)  HR: 105 (30 Nov 2018 10:59) (78 - 105)  BP: 149/78 (30 Nov 2018 10:59) (134/84 - 149/78)  RR: 17 (30 Nov 2018 10:59) (17 - 19)  SpO2: 98% (30 Nov 2018 10:59) (95% - 100%)      PHYSICAL EXAM:  General: Well developed;  mildly dyspneic on conversation  Eyes: PERRLA, EOMI; conjunctiva and sclera clear  Head: Normocephalic; atraumatic  ENMT: No nasal discharge; airway clear  Neck: Supple; no JVD   Respiratory: Better air enntry, decreased BS at bases,  no rhonchi or rales  Cardiovascular: Regular rate and rhythm. S1 and S2 Normal; No murmurs  Gastrointestinal: Soft non-tender non-distended; Normal bowel sounds  Genitourinary: No costovertebral angle tenderness  Extremities: Normal range of motion, No edema, right forearm with mobile soft tissue mass likely lipoma, nontender.   Vascular: Peripheral pulses palpable 2+ bilaterally  Neurological: Alert and oriented x4, non focal   Skin: Warm and dry. No acute rash  Lymph Nodes: No acute cervical adenopathy  Musculoskeletal: Normal muscle tone, without deformities  Psychiatric: Cooperative and appropriate                            12.6   23.71 )-----------( 301      ( 30 Nov 2018 06:33 )             38.8           MEDICATIONS  (STANDING):  ALBUTerol/ipratropium for Nebulization 3 milliLiter(s) Nebulizer every 6 hours  azithromycin  IVPB 500 milliGRAM(s) IV Intermittent every 24 hours  cefTRIAXone Injectable. 1000 milliGRAM(s) IV Push every 24 hours  enoxaparin Injectable 40 milliGRAM(s) SubCutaneous every 24 hours  guaiFENesin ER 1200 milliGRAM(s) Oral every 12 hours  nicotine - 21 mG/24Hr(s) Patch 1 patch Transdermal daily  predniSONE   Tablet 40 milliGRAM(s) Oral daily  tiotropium 18 MICROgram(s) Capsule 1 Capsule(s) Inhalation daily    MEDICATIONS  (PRN):  acetaminophen   Tablet .. 650 milliGRAM(s) Oral every 6 hours PRN Temp greater or equal to 38C (100.4F), Mild Pain (1 - 3)  benzonatate 100 milliGRAM(s) Oral three times a day PRN Cough  docusate sodium 100 milliGRAM(s) Oral three times a day PRN Constipation  ondansetron Injectable 4 milliGRAM(s) IV Push every 6 hours PRN Nausea  senna 2 Tablet(s) Oral at bedtime PRN Constipation      A/P:      62 y/o M PMHx significant for COPD, long-standing hx of tobacco use, and hepatitis C admitted for:       1. Acute hypoxic Respiratory failure due to COPD exacerbation and Community acquired pneumonia.  Acute Bronchiolitis. Severe emphysema   - clinically better   - CT chest reviewed: pulmonary nodule 8mm cystic LLL --> RECOMMEND 6 month f/u repeat CT Chest.   - ABG reviewed   - RVP neg   - neg. BCXs   - send Legionella/Strep pneumo urine Ag: negative   - On  IV Ceftriaxone and  Azithromycin day #4, will c/w Azithro PO x 1 day and Ceftin x 5 days   - Repeat CXR   with improvement  - C/w Spiriva, Albuterol and Symbicort   -F/u with Dr Herron     2.  Chronic hepatitis C  - not currently on treatment.   - LFTs WNL  - F/u with PCP for further management     3.  Leukocytosis, initially likely due to PNA  - still elevated likely due to steroids, star trending down   - no fevers  - repeat labs with PCP next week     4. DVT PPxs : On Lovenox     Dispo:  Stable for d/c today but not able to get placement to shelter, d/c on hold till Monday as per CW

## 2018-11-30 NOTE — PHYSICAL THERAPY INITIAL EVALUATION ADULT - MODALITIES TREATMENT COMMENTS
Patient returned to bed by request, call bell in reach.  Patient independent in functional mobility, no skilled physical therapy need in this setting.  May ambulate per nursing.  Will d/c from PT. RN, CM informed.

## 2018-11-30 NOTE — PROGRESS NOTE ADULT - ASSESSMENT
1) Acute COPD Exacerbation  2) Dyspnea  3) Bronchiolitis  4) Abnormal CT Chest  5) Centrilobular Emphysema  6) Bullous Disease       62 y/o M PMHx significant for COPD, long-standing hx of tobacco use, and hepatitis C presents to the ED c/o shortness of breath, wheezing, and productive cough worse over the last 1-2 days.  Patient was scheduled for an appointment with me in the office but was too sick to come in and went to his primary care physician in one to the ER directly.  He was supposed to see me on December 5.  On examination he has diminished breath sounds bilaterally  At the present time he is on DuoNeb azithromycin and ceftriaxone and methylprednisolone 40 mg IV every 8 hours  Will continue this regimen for now  Reviewed ABG, shows no evidence of hypoxemia but will walk patient and if he desaturates, will need O2.  Recommend echocardiogram since PA is slightly enlarged on CT.  Will continue to monitor, overall respiratory status is improving    Will continue to follow 1) Acute COPD Exacerbation  2) Dyspnea  3) Bronchiolitis  4) Abnormal CT Chest  5) Centrilobular Emphysema  6) Bullous Disease       64 y/o M PMHx significant for COPD, long-standing hx of tobacco use, and hepatitis C presents to the ED c/o shortness of breath, wheezing, and productive cough worse over the last 1-2 days.  Patient was scheduled for an appointment with me in the office but was too sick to come in and went to his primary care physician in one to the ER directly.  He was supposed to see me on December 5.  On examination he has diminished breath sounds bilaterally  At the present time he is on DuoNeb azithromycin and ceftriaxone and methylprednisolone 40 mg IV every 8 hours  Will continue this regimen for now  Reviewed ABG, shows no evidence of hypoxemia but will walk patient and if he desaturates, will need O2.  Will continue to monitor, overall respiratory status is improving  Reduced IV Steroids to PO Prednisone 40mg  Continue to monitor inpatient.   Will continue to follow

## 2018-11-30 NOTE — DISCHARGE NOTE ADULT - CARE PROVIDER_API CALL
Rohit Najera), Medicine  [address] [place] [address]  Phone: [phone]  Fax: [phone]    Cody Herron), Internal Medicine  [address] [place] [address]  Phone: [phone]  Fax: [phone]

## 2018-11-30 NOTE — PROGRESS NOTE ADULT - MINUTES
Called disability regarding pt's email about disability forms. Per review with disability, pt's disability forms were sent to his PCP instead of heart transplant. Disability was able to retreive paperwork and was in the process of filling them out and should be sent to Dr. Hernández tomorrow.     Called pt and pt's wife. Lm on  to call back regarding disability paperwork.     Email: I'm so aggravated. I talked to disability dept this am and was told that she sees where the form was sent to Dr Mohr's office so to call them.  I called his office this am and they said they never got the paperwork.  I just got off the phone with disability and now they are saying they don't have the form.  They see where it was faxed but don't have a copy.  Explained it should've never went to Dr Mohr's office in the first place.  They are saying you didn't say it was for heart transplant so they assumed to go to PCP.  It has to be filled out by cardio. Now, he will not get paid on the 1st.  Lv is going to drive to [place] tomorrow and bring the form.  Will there be someone in clinic who can fill it out?  We don't want to have to go through disability department again.  Usually they just do it in clinic.   Thanks,   Elizabeth   
50
40

## 2018-11-30 NOTE — DISCHARGE NOTE ADULT - PATIENT PORTAL LINK FT
You can access the [facility] Patient Portal, offered by [facility], by registering with the following website: [email or web address]

## 2018-11-30 NOTE — DISCHARGE NOTE ADULT - MEDICATION SUMMARY - MEDICATIONS TO TAKE
I will START or STAY ON the medications listed below when I get home from the hospital:    predniSONE 10 mg oral tablet  -- 4 tab PO once a day x 3 days  3 tab PO once a day x 3 days  2 tab PO  once a day x 3 days  1 tab PO  once a day x 3 days, stop  -- It is very important that you take or use this exactly as directed.  Do not skip doses or discontinue unless directed by your doctor.  Obtain medical advice before taking any non-prescription drugs as some may affect the action of this medication.  Take with food or milk.    -- Indication: For COPD exacerbation    benzonatate 100 mg oral capsule  -- 1 cap(s) by mouth 3 times a day, As needed, Cough  -- Indication: For Cough    albuterol 90 mcg/inh inhalation aerosol  -- 2 puff(s) inhaled 4 times a day, As Needed -for bronchospasm   -- For inhalation only.  It is very important that you take or use this exactly as directed.  Do not skip doses or discontinue unless directed by your doctor.  Obtain medical advice before taking any non-prescription drugs as some may affect the action of this medication.  Shake well before use.    -- Indication: For Wheezing     Spiriva 18 mcg inhalation capsule  -- 1 cap(s) inhaled once a day   -- Check with your doctor before becoming pregnant.  For inhalation only.  It is very important that you take or use this exactly as directed.  Do not skip doses or discontinue unless directed by your doctor.  Obtain medical advice before taking any non-prescription drugs as some may affect the action of this medication.    -- Indication: For COPD exacerbation    Symbicort 160 mcg-4.5 mcg/inh inhalation aerosol  -- 2 puff(s) inhaled 2 times a day   -- Check with your doctor before becoming pregnant.  For inhalation only.  Rinse mouth thoroughly after use.    -- Indication: For COPD exacerbation    cefuroxime 500 mg oral tablet  -- 1 tab(s) by mouth 2 times a day   -- Finish all this medication unless otherwise directed by prescriber.  Medication should be taken with plenty of water.  Take with food or milk.    -- Indication: For PNA    azithromycin 500 mg oral tablet  -- 1 tab(s) by mouth once a day   -- Do not take dairy products, antacids, or iron preparations within one hour of this medication.  Finish all this medication unless otherwise directed by prescriber.    -- Indication: For PNA    nicotine 21 mg/24 hr transdermal film, extended release  -- 1 patch by transdermal patch once a day   -- Indication: For smoking cessation I will START or STAY ON the medications listed below when I get home from the hospital:    benzonatate 100 mg oral capsule  -- 1 cap(s) by mouth 3 times a day, As needed, Cough  -- Indication: For Cough    albuterol 90 mcg/inh inhalation aerosol  -- 2 puff(s) inhaled 4 times a day, As Needed -for bronchospasm   -- For inhalation only.  It is very important that you take or use this exactly as directed.  Do not skip doses or discontinue unless directed by your doctor.  Obtain medical advice before taking any non-prescription drugs as some may affect the action of this medication.  Shake well before use.    -- Indication: For Wheezing     Spiriva 18 mcg inhalation capsule  -- 1 cap(s) inhaled once a day   -- Check with your doctor before becoming pregnant.  For inhalation only.  It is very important that you take or use this exactly as directed.  Do not skip doses or discontinue unless directed by your doctor.  Obtain medical advice before taking any non-prescription drugs as some may affect the action of this medication.    -- Indication: For COPD exacerbation    Symbicort 160 mcg-4.5 mcg/inh inhalation aerosol  -- 2 puff(s) inhaled 2 times a day   -- Check with your doctor before becoming pregnant.  For inhalation only.  Rinse mouth thoroughly after use.    -- Indication: For COPD exacerbation    nicotine 21 mg/24 hr transdermal film, extended release  -- 1 patch by transdermal patch once a day   -- Indication: For smoking cessation

## 2018-11-30 NOTE — PHYSICAL THERAPY INITIAL EVALUATION ADULT - PERTINENT HX OF CURRENT PROBLEM, REHAB EVAL
62 yo M admitted  c/o shortness of breath, wheezing, and productive cough worse over the last 1-2 days.  Chest CT:  severe Centrilobular and paraseptal emphysema, nonspecific bronchiolitis,  cystic nodule.

## 2018-11-30 NOTE — DISCHARGE NOTE ADULT - CARE PLAN
Principal Discharge DX:	Community acquired pneumonia  Goal:	resolve  Assessment and plan of treatment:	Complete oral abxs  Secondary Diagnosis:	COPD exacerbation  Goal:	resolve  Assessment and plan of treatment:	Complete oral steroids  C/w inhalers   F/u with Dr Wilson  Secondary Diagnosis:	Lung cyst  Assessment and plan of treatment:	F/u with DR Wilson, will need repeat CT chest within 6 months

## 2018-11-30 NOTE — DISCHARGE NOTE ADULT - PLAN OF CARE
resolve Complete oral abxs Complete oral steroids  C/w inhalers   F/u with Dr Wilson F/u with DR Herron, will need repeat CT chest within 6 months

## 2018-11-30 NOTE — DISCHARGE NOTE ADULT - INSTRUCTIONS
Regular diet Follow up with Dr Herron on Friday 12/7 at 12:15   Return to hospital if you develop fever >101, difficulty breathing, shortness of breath or chest pain. Refrain from smoking, continue with nicotene patches.

## 2018-11-30 NOTE — DISCHARGE NOTE ADULT - ADDITIONAL INSTRUCTIONS
F/u with PCP  and Pulm next week F/u with PCP  and Pulm next week  Appointment made with Dr Herron 12/7/18 at 12:15

## 2018-11-30 NOTE — DISCHARGE NOTE ADULT - HOSPITAL COURSE
62 y/o M PMHx significant for COPD, long-standing hx of tobacco use, and hepatitis C presented  to the ED c/o shortness of breath, wheezing, and productive cough worse over the last 1-2 days PTA.  He  start feeling sick over 1 weeks before ER visit.    In triage VS => /min, RR 22/min. Labs => WBC 25, HCO3 34, Lactate 1.1, RVP (-). In the ED the patient was given Levofloxacin 500mg IVPB x 1, Magnesium sulfate 1g IVPB x 1, Methylprednisolone 125mg IVP x 1, NS x 1.5L, and Combivent nebs x 3. CT Chest => 1. Centrilobular and paraseptal emphysema. 2. Scattered tree in bud opacities. Bilateral bronchial wall thickening. Findings compatible with nonspecific bronchiolitis.  Pt was admitted to the hospital. Was evaluated by Pulm . Clinically improved on IV Abxs and Steroids. Today reports SOB and cough improving, ambulated in the hallway off O2 with stable pulse ox, no significant SOB. D/c planning, meds and outPt f/u discussed     Vital Signs Last 24 Hrs  T(C): 36.4 (30 Nov 2018 10:59), Max: 37 (29 Nov 2018 20:31)  T(F): 97.6 (30 Nov 2018 10:59), Max: 98.6 (29 Nov 2018 20:31)  HR: 105 (30 Nov 2018 10:59) (78 - 105)  BP: 149/78 (30 Nov 2018 10:59) (134/84 - 149/78)  RR: 17 (30 Nov 2018 10:59) (17 - 19)  SpO2: 98% (30 Nov 2018 10:59) (95% - 100%)      PHYSICAL EXAM:  General: Well developed;  mildly dyspneic on conversation  Eyes: PERRLA, EOMI; conjunctiva and sclera clear  Head: Normocephalic; atraumatic  ENMT: No nasal discharge; airway clear  Neck: Supple; no JVD   Respiratory: Better air enntry, decreased BS at bases,  no rhonchi or rales  Cardiovascular: Regular rate and rhythm. S1 and S2 Normal; No murmurs  Gastrointestinal: Soft non-tender non-distended; Normal bowel sounds  Genitourinary: No costovertebral angle tenderness  Extremities: Normal range of motion, No edema, right forearm with mobile soft tissue mass likely lipoma, nontender.   Vascular: Peripheral pulses palpable 2+ bilaterally  Neurological: Alert and oriented x4, non focal   Skin: Warm and dry. No acute rash  Lymph Nodes: No acute cervical adenopathy  Musculoskeletal: Normal muscle tone, without deformities  Psychiatric: Cooperative and appropriate    PLAN:     1. Acute hypoxic Respiratory failure due to COPD exacerbation and Community acquired pneumonia.  Acute Bronchiolitis. Severe emphysema   - clinically better   - CT chest reviewed: pulmonary nodule 8mm cystic LLL --> RECOMMEND 6 month f/u repeat CT Chest.   - ABG reviewed   - RVP neg   - neg. BCXs   - send Legionella/Strep pneumo urine Ag: negative   - On  IV Ceftriaxone and  Azithromycin day #4, will c/w Azithro PO x 1 day and Ceftin x 5 days   - Repeat CXR   with improvement  - C/w Spiriva, Albuterol and Symbicort   -F/u with Dr Herron     2.  Chronic hepatitis C  - not currently on treatment.   - LFTs WNL  - F/u with PCP for further management     3.  Leukocytosis, initially likely due to PNA  - still elevated likely due to steroids, star trending down   - no fevers  - repeat labs with PCP next week     4. DVT PPxs : On Lovenox     Dispo:  Stable for d/c today. D/w CW/CM pt will be going to shelter   Total time 45 min   D/c summary to be faxed over to DR Najera

## 2018-11-30 NOTE — PROGRESS NOTE ADULT - SUBJECTIVE AND OBJECTIVE BOX
Patient is a 63y old  Male who presents with a chief complaint of Shortness of Breath, Wheezing, and Cough (27 Nov 2018 08:49)      HPI:  62 y/o M PMHx significant for COPD, long-standing hx of tobacco use, and hepatitis C presents to the ED c/o shortness of breath, wheezing, and productive cough worse over the last 1-2 days. In triage VS => /min, RR 22/min. Labs => WBC 25, HCO3 34, Lactate 1.1, RVP (-). In the ED the patient was given Levofloxacin 500mg IVPB x 1, Magnesium sulfate 1g IVPB x 1, Methylprednisolone 125mg IVP x 1, NS x 1.5L, and Combivent nebs x 3. CT Chest => 1. Centrilobular and paraseptal emphysema. 2. Scattered tree in bud opacities. Bilateral bronchial wall thickening. Findings compatible with nonspecific bronchiolitis. (26 Nov 2018 22:41)  PAST MEDICAL & SURGICAL HISTORY:  COPD (chronic obstructive pulmonary disease)  Hepatitis C  No significant past surgical history    11/29/18  No acute pulmonary events occurred overnight     PREVIOUS DIAGNOSTIC TESTING:      MEDICATIONS  (STANDING):  ALBUTerol/ipratropium for Nebulization 3 milliLiter(s) Nebulizer every 6 hours  azithromycin  IVPB 500 milliGRAM(s) IV Intermittent every 24 hours  cefTRIAXone Injectable. 1000 milliGRAM(s) IV Push every 24 hours  enoxaparin Injectable 40 milliGRAM(s) SubCutaneous every 24 hours  guaiFENesin ER 1200 milliGRAM(s) Oral every 12 hours  methylPREDNISolone sodium succinate Injectable 40 milliGRAM(s) IV Push two times a day  nicotine - 21 mG/24Hr(s) Patch 1 patch Transdermal daily    MEDICATIONS  (PRN):  acetaminophen   Tablet .. 650 milliGRAM(s) Oral every 6 hours PRN Temp greater or equal to 38C (100.4F), Mild Pain (1 - 3)  benzonatate 100 milliGRAM(s) Oral three times a day PRN Cough  docusate sodium 100 milliGRAM(s) Oral three times a day PRN Constipation  ondansetron Injectable 4 milliGRAM(s) IV Push every 6 hours PRN Nausea  senna 2 Tablet(s) Oral at bedtime PRN Constipation        FAMILY HISTORY:  Family history of diabetes mellitus (Father)      SOCIAL HISTORY:  ***    REVIEW OF SYSTEM:  cough, dyspnea, otherwise 12 point ROS negative     Vital Signs Last 24 Hrs  T(C): 36.7 (28 Nov 2018 10:50), Max: 36.8 (28 Nov 2018 05:44)  T(F): 98 (28 Nov 2018 10:50), Max: 98.3 (28 Nov 2018 05:44)  HR: 84 (28 Nov 2018 13:57) (84 - 100)  BP: 114/61 (28 Nov 2018 10:50) (114/61 - 145/77)  BP(mean): --  RR: 18 (28 Nov 2018 10:50) (17 - 19)  SpO2: 97% (28 Nov 2018 10:50) (91% - 98%)    I&O's Summary    PHYSICAL EXAM  General Appearance: cooperative, no acute distress,   HEENT: PERRL, conjunctiva clear, EOM's intact, non injected pharynx, no exudate, TM   normal  Neck: Supple, , no adenopathy, thyroid: not enlarged, no carotid bruit or JVD  Back: Symmetric, no  tenderness,no soft tissue tenderness  Lungs: Diminished bilaterally   Heart: Regular rate and rhythm, S1, S2 normal, no murmur, rub or gallop  Abdomen: Soft, non-tender, bowel sounds active , no hepatosplenomegaly  Extremities: no cyanosis or edema, no joint swelling  Skin: Skin color, texture normal, no rashes   Neurologic: Alert and oriented X3 , cranial nerves intact, sensory and motor normal,    ECG:    LABS:                          13.0   26.63 )-----------( 284      ( 27 Nov 2018 07:20 )             39.5     11-27    142  |  105  |  12  ----------------------------<  135<H>  3.8   |  30  |  0.67    Ca    8.2<L>      27 Nov 2018 07:20  Mg     2.3     11-27    TPro  7.2  /  Alb  2.4<L>  /  TBili  0.3  /  DBili  x   /  AST  17  /  ALT  21  /  AlkPhos  85  11-27    CARDIAC MARKERS ( 26 Nov 2018 17:47 )  <0.015 ng/mL / x     / x     / x     / x            Pro BNP  272 11-26 @ 17:47  D Dimer  -- 11-26 @ 17:47    PT/INR - ( 26 Nov 2018 17:47 )   PT: 13.7 sec;   INR: 1.23 ratio         PTT - ( 26 Nov 2018 17:47 )  PTT:28.4 sec    ABG - ( 27 Nov 2018 01:18 )  pH, Arterial: 7.46  pH, Blood: x     /  pCO2: 41    /  pO2: 71    / HCO3: 29    / Base Excess: 5.0   /  SaO2: 94                    RADIOLOGY & ADDITIONAL STUDIES: Patient is a 63y old  Male who presents with a chief complaint of Shortness of Breath, Wheezing, and Cough (27 Nov 2018 08:49)      HPI:  64 y/o M PMHx significant for COPD, long-standing hx of tobacco use, and hepatitis C presents to the ED c/o shortness of breath, wheezing, and productive cough worse over the last 1-2 days. In triage VS => /min, RR 22/min. Labs => WBC 25, HCO3 34, Lactate 1.1, RVP (-). In the ED the patient was given Levofloxacin 500mg IVPB x 1, Magnesium sulfate 1g IVPB x 1, Methylprednisolone 125mg IVP x 1, NS x 1.5L, and Combivent nebs x 3. CT Chest => 1. Centrilobular and paraseptal emphysema. 2. Scattered tree in bud opacities. Bilateral bronchial wall thickening. Findings compatible with nonspecific bronchiolitis. (26 Nov 2018 22:41)  PAST MEDICAL & SURGICAL HISTORY:  COPD (chronic obstructive pulmonary disease)  Hepatitis C  No significant past surgical history    11/30/18  No acute pulmonary events occurred overnight     PREVIOUS DIAGNOSTIC TESTING:      MEDICATIONS  (STANDING):  ALBUTerol/ipratropium for Nebulization 3 milliLiter(s) Nebulizer every 6 hours  azithromycin  IVPB 500 milliGRAM(s) IV Intermittent every 24 hours  cefTRIAXone Injectable. 1000 milliGRAM(s) IV Push every 24 hours  enoxaparin Injectable 40 milliGRAM(s) SubCutaneous every 24 hours  guaiFENesin ER 1200 milliGRAM(s) Oral every 12 hours  methylPREDNISolone sodium succinate Injectable 40 milliGRAM(s) IV Push two times a day  nicotine - 21 mG/24Hr(s) Patch 1 patch Transdermal daily    MEDICATIONS  (PRN):  acetaminophen   Tablet .. 650 milliGRAM(s) Oral every 6 hours PRN Temp greater or equal to 38C (100.4F), Mild Pain (1 - 3)  benzonatate 100 milliGRAM(s) Oral three times a day PRN Cough  docusate sodium 100 milliGRAM(s) Oral three times a day PRN Constipation  ondansetron Injectable 4 milliGRAM(s) IV Push every 6 hours PRN Nausea  senna 2 Tablet(s) Oral at bedtime PRN Constipation        FAMILY HISTORY:  Family history of diabetes mellitus (Father)      SOCIAL HISTORY:  ***    REVIEW OF SYSTEM:  cough, dyspnea, otherwise 12 point ROS negative     Vital Signs Last 24 Hrs  T(C): 36.7 (28 Nov 2018 10:50), Max: 36.8 (28 Nov 2018 05:44)  T(F): 98 (28 Nov 2018 10:50), Max: 98.3 (28 Nov 2018 05:44)  HR: 84 (28 Nov 2018 13:57) (84 - 100)  BP: 114/61 (28 Nov 2018 10:50) (114/61 - 145/77)  BP(mean): --  RR: 18 (28 Nov 2018 10:50) (17 - 19)  SpO2: 97% (28 Nov 2018 10:50) (91% - 98%)    I&O's Summary    PHYSICAL EXAM  General Appearance: cooperative, no acute distress,   HEENT: PERRL, conjunctiva clear, EOM's intact, non injected pharynx, no exudate, TM   normal  Neck: Supple, , no adenopathy, thyroid: not enlarged, no carotid bruit or JVD  Back: Symmetric, no  tenderness,no soft tissue tenderness  Lungs: Diminished bilaterally   Heart: Regular rate and rhythm, S1, S2 normal, no murmur, rub or gallop  Abdomen: Soft, non-tender, bowel sounds active , no hepatosplenomegaly  Extremities: no cyanosis or edema, no joint swelling  Skin: Skin color, texture normal, no rashes   Neurologic: Alert and oriented X3 , cranial nerves intact, sensory and motor normal,    ECG:    LABS:                          13.0   26.63 )-----------( 284      ( 27 Nov 2018 07:20 )             39.5     11-27    142  |  105  |  12  ----------------------------<  135<H>  3.8   |  30  |  0.67    Ca    8.2<L>      27 Nov 2018 07:20  Mg     2.3     11-27    TPro  7.2  /  Alb  2.4<L>  /  TBili  0.3  /  DBili  x   /  AST  17  /  ALT  21  /  AlkPhos  85  11-27    CARDIAC MARKERS ( 26 Nov 2018 17:47 )  <0.015 ng/mL / x     / x     / x     / x            Pro BNP  272 11-26 @ 17:47  D Dimer  -- 11-26 @ 17:47    PT/INR - ( 26 Nov 2018 17:47 )   PT: 13.7 sec;   INR: 1.23 ratio         PTT - ( 26 Nov 2018 17:47 )  PTT:28.4 sec    ABG - ( 27 Nov 2018 01:18 )  pH, Arterial: 7.46  pH, Blood: x     /  pCO2: 41    /  pO2: 71    / HCO3: 29    / Base Excess: 5.0   /  SaO2: 94                    RADIOLOGY & ADDITIONAL STUDIES:

## 2018-12-01 RX ORDER — AZITHROMYCIN 500 MG/1
500 TABLET, FILM COATED ORAL ONCE
Qty: 0 | Refills: 0 | Status: COMPLETED | OUTPATIENT
Start: 2018-12-01 | End: 2018-12-01

## 2018-12-01 RX ADMIN — ENOXAPARIN SODIUM 40 MILLIGRAM(S): 100 INJECTION SUBCUTANEOUS at 08:31

## 2018-12-01 RX ADMIN — Medication 500 MILLIGRAM(S): at 17:06

## 2018-12-01 RX ADMIN — Medication 1 PATCH: at 10:36

## 2018-12-01 RX ADMIN — Medication 500 MILLIGRAM(S): at 05:25

## 2018-12-01 RX ADMIN — Medication 1200 MILLIGRAM(S): at 05:25

## 2018-12-01 RX ADMIN — Medication 1 PATCH: at 10:35

## 2018-12-01 RX ADMIN — Medication 3 MILLILITER(S): at 20:34

## 2018-12-01 RX ADMIN — AZITHROMYCIN 500 MILLIGRAM(S): 500 TABLET, FILM COATED ORAL at 06:13

## 2018-12-01 RX ADMIN — Medication 3 MILLILITER(S): at 08:28

## 2018-12-01 RX ADMIN — Medication 1 PATCH: at 11:23

## 2018-12-01 RX ADMIN — Medication 3 MILLILITER(S): at 13:37

## 2018-12-01 RX ADMIN — Medication 3 MILLILITER(S): at 01:40

## 2018-12-01 RX ADMIN — Medication 1200 MILLIGRAM(S): at 17:06

## 2018-12-01 RX ADMIN — Medication 1 PATCH: at 20:01

## 2018-12-01 NOTE — PROGRESS NOTE ADULT - ASSESSMENT
1) Acute COPD Exacerbation  2) Dyspnea  3) Bronchiolitis  4) Abnormal CT Chest  5) Centrilobular Emphysema  6) Bullous Disease       62 y/o M PMHx significant for COPD, long-standing hx of tobacco use, and hepatitis C presents to the ED c/o shortness of breath, wheezing, and productive cough worse over the last 1-2 days.  Patient was scheduled for an appointment with me in the office but was too sick to come in and went to his primary care physician in one to the ER directly.  He was supposed to see me on December 5.  On examination he has diminished breath sounds bilaterally  At the present time he is on DuoNeb azithromycin and ceftriaxone and methylprednisolone 40 mg IV every 8 hours  Will continue this regimen for now  Reviewed ABG, shows no evidence of hypoxemia but will walk patient and if he desaturates, will need O2.  Will continue to monitor, overall respiratory status is improving  Prednisone 40mg X 5 days   Awaiting placement  Will continue to follow

## 2018-12-01 NOTE — PROGRESS NOTE ADULT - SUBJECTIVE AND OBJECTIVE BOX
Patient is a 63y old  Male who presents with a chief complaint of Shortness of Breath, Wheezing, and Cough (27 Nov 2018 08:49)      HPI:  62 y/o M PMHx significant for COPD, long-standing hx of tobacco use, and hepatitis C presents to the ED c/o shortness of breath, wheezing, and productive cough worse over the last 1-2 days. In triage VS => /min, RR 22/min. Labs => WBC 25, HCO3 34, Lactate 1.1, RVP (-). In the ED the patient was given Levofloxacin 500mg IVPB x 1, Magnesium sulfate 1g IVPB x 1, Methylprednisolone 125mg IVP x 1, NS x 1.5L, and Combivent nebs x 3. CT Chest => 1. Centrilobular and paraseptal emphysema. 2. Scattered tree in bud opacities. Bilateral bronchial wall thickening. Findings compatible with nonspecific bronchiolitis. (26 Nov 2018 22:41)  PAST MEDICAL & SURGICAL HISTORY:  COPD (chronic obstructive pulmonary disease)  Hepatitis C  No significant past surgical history    12/1/18  No acute pulmonary events occurred overnight     PREVIOUS DIAGNOSTIC TESTING:      MEDICATIONS  (STANDING):  ALBUTerol/ipratropium for Nebulization 3 milliLiter(s) Nebulizer every 6 hours  azithromycin  IVPB 500 milliGRAM(s) IV Intermittent every 24 hours  cefTRIAXone Injectable. 1000 milliGRAM(s) IV Push every 24 hours  enoxaparin Injectable 40 milliGRAM(s) SubCutaneous every 24 hours  guaiFENesin ER 1200 milliGRAM(s) Oral every 12 hours  methylPREDNISolone sodium succinate Injectable 40 milliGRAM(s) IV Push two times a day  nicotine - 21 mG/24Hr(s) Patch 1 patch Transdermal daily    MEDICATIONS  (PRN):  acetaminophen   Tablet .. 650 milliGRAM(s) Oral every 6 hours PRN Temp greater or equal to 38C (100.4F), Mild Pain (1 - 3)  benzonatate 100 milliGRAM(s) Oral three times a day PRN Cough  docusate sodium 100 milliGRAM(s) Oral three times a day PRN Constipation  ondansetron Injectable 4 milliGRAM(s) IV Push every 6 hours PRN Nausea  senna 2 Tablet(s) Oral at bedtime PRN Constipation        FAMILY HISTORY:  Family history of diabetes mellitus (Father)      SOCIAL HISTORY:  ***    REVIEW OF SYSTEM:  cough, dyspnea, otherwise 12 point ROS negative     Vital Signs Last 24 Hrs  T(C): 36.7 (01 Dec 2018 06:13), Max: 37.2 (30 Nov 2018 20:51)  T(F): 98.1 (01 Dec 2018 06:13), Max: 99 (30 Nov 2018 20:51)  HR: 100 (01 Dec 2018 08:28) (88 - 106)  BP: 161/87 (01 Dec 2018 06:13) (140/79 - 161/87)  BP(mean): --  RR: 17 (01 Dec 2018 06:13) (17 - 17)  SpO2: 100% (01 Dec 2018 06:13) (96% - 100%)    I&O's Summary    PHYSICAL EXAM  General Appearance: cooperative, no acute distress,   HEENT: PERRL, conjunctiva clear, EOM's intact, non injected pharynx, no exudate, TM   normal  Neck: Supple, , no adenopathy, thyroid: not enlarged, no carotid bruit or JVD  Back: Symmetric, no  tenderness,no soft tissue tenderness  Lungs: Diminished bilaterally   Heart: Regular rate and rhythm, S1, S2 normal, no murmur, rub or gallop  Abdomen: Soft, non-tender, bowel sounds active , no hepatosplenomegaly  Extremities: no cyanosis or edema, no joint swelling  Skin: Skin color, texture normal, no rashes   Neurologic: Alert and oriented X3 , cranial nerves intact, sensory and motor normal,    ECG:    LABS:                          13.0   26.63 )-----------( 284      ( 27 Nov 2018 07:20 )             39.5     11-27    142  |  105  |  12  ----------------------------<  135<H>  3.8   |  30  |  0.67    Ca    8.2<L>      27 Nov 2018 07:20  Mg     2.3     11-27    TPro  7.2  /  Alb  2.4<L>  /  TBili  0.3  /  DBili  x   /  AST  17  /  ALT  21  /  AlkPhos  85  11-27    CARDIAC MARKERS ( 26 Nov 2018 17:47 )  <0.015 ng/mL / x     / x     / x     / x            Pro BNP  272 11-26 @ 17:47  D Dimer  -- 11-26 @ 17:47    PT/INR - ( 26 Nov 2018 17:47 )   PT: 13.7 sec;   INR: 1.23 ratio         PTT - ( 26 Nov 2018 17:47 )  PTT:28.4 sec    ABG - ( 27 Nov 2018 01:18 )  pH, Arterial: 7.46  pH, Blood: x     /  pCO2: 41    /  pO2: 71    / HCO3: 29    / Base Excess: 5.0   /  SaO2: 94                    RADIOLOGY & ADDITIONAL STUDIES:

## 2018-12-01 NOTE — PROGRESS NOTE ADULT - SUBJECTIVE AND OBJECTIVE BOX
CC: Cough  HPI: 64 y/o M PMHx significant for COPD, long-standing hx of tobacco use, and hepatitis C presented  to the ED c/o shortness of breath, wheezing, and productive cough worse over the last 1-2 days PTA.  He  start feeling sick over 1 weeks before ER visit.    In triage VS => /min, RR 22/min. Labs => WBC 25, HCO3 34, Lactate 1.1, RVP (-). In the ED the patient was given Levofloxacin 500mg IVPB x 1, Magnesium sulfate 1g IVPB x 1, Methylprednisolone 125mg IVP x 1, NS x 1.5L, and Combivent nebs x 3. CT Chest => 1. Centrilobular and paraseptal emphysema. 2. Scattered tree in bud opacities. Bilateral bronchial wall thickening. Findings compatible with nonspecific bronchiolitis.  Pt was admitted to the hospital. Was evaluated by Pulm . Clinically improved on IV Abxs and Steroids. Today reports SOB and cough improving, ambulated in the hallway off O2 with stable pulse ox, no significant SOB. D/c planning, meds and outPt f/u discussed. Pt was planned for d/c to shelter, but as per SW/CM did not get answer  from facility, will have to hold d/c until Monday 12/1: Chart reviewed, mild productive cough, no fevers. Feels slightly weak.   ROS: neg unless stated above.     Vital Signs Last 24 Hrs  T(C): 37.1 (01 Dec 2018 11:30), Max: 37.2 (30 Nov 2018 20:51)  T(F): 98.8 (01 Dec 2018 11:30), Max: 99 (30 Nov 2018 20:51)  HR: 106 (01 Dec 2018 11:30) (88 - 106)  BP: 136/74 (01 Dec 2018 11:30) (136/74 - 161/87)  BP(mean): --  RR: 17 (01 Dec 2018 11:30) (17 - 17)  SpO2: 100% (01 Dec 2018 11:30) (96% - 100%)    PHYSICAL EXAM:  General: Well developed;  mildly dyspneic on prolonged conversation  Eyes: PERRLA, EOMI; conjunctiva and sclera clear  Head: Normocephalic; atraumatic  ENMT: No nasal discharge; airway clear  Neck: Supple; no JVD   Respiratory: Better air enntry, decreased BS at bases,  no rhonchi or rales  Cardiovascular: Regular rate and rhythm. S1 and S2 Normal; No murmurs  Gastrointestinal: Soft non-tender non-distended; Normal bowel sounds  Genitourinary: No costovertebral angle tenderness  Extremities: Normal range of motion, No edema, right forearm with mobile soft tissue mass likely lipoma, nontender.   Vascular: Peripheral pulses palpable 2+ bilaterally  Neurological: Alert and oriented x4, non focal   Skin: Warm and dry. No acute rash  Lymph Nodes: No acute cervical adenopathy  Musculoskeletal: Normal muscle tone, without deformities  Psychiatric: Cooperative and appropriate                            12.6   23.71 )-----------( 301      ( 30 Nov 2018 06:33 )             38.8   MEDICATIONS  (STANDING):  ALBUTerol/ipratropium for Nebulization 3 milliLiter(s) Nebulizer every 6 hours  cefuroxime   Tablet 500 milliGRAM(s) Oral every 12 hours  enoxaparin Injectable 40 milliGRAM(s) SubCutaneous every 24 hours  guaiFENesin ER 1200 milliGRAM(s) Oral every 12 hours  nicotine - 21 mG/24Hr(s) Patch 1 patch Transdermal daily  predniSONE   Tablet 40 milliGRAM(s) Oral daily  tiotropium 18 MICROgram(s) Capsule 1 Capsule(s) Inhalation daily    A/P:      64 y/o M PMHx significant for COPD, long-standing hx of tobacco use, and hepatitis C admitted for:     1. Acute hypoxic Respiratory failure due to COPD exacerbation and Community acquired pneumonia.  Acute Bronchiolitis. Severe emphysema   - clinically better   - CT chest reviewed: pulmonary nodule 8mm cystic LLL --> RECOMMEND 6 month f/u repeat CT Chest. DISCUSSED WITH PATIENT  - ABG reviewed   - RVP neg   - neg. BCXs   - send Legionella/Strep pneumo urine Ag: negative   - s/p IV abx, now on Ceftin x 5 days day 2/5  - Repeat CXR   with improvement  - C/w Spiriva, Albuterol and Symbicort   -F/u with Dr Herron     2.  Chronic hepatitis C  - not currently on treatment.   - LFTs WNL  - F/u with PCP for further management     3.  Leukocytosis, initially likely due to PNA  - still elevated likely due to steroids, star trending down   - no fevers  - repeat labs with PCP next week     4. DVT PPxs : On Lovenox     Dispo:  Stable for d/c today but not able to get placement to shelter, d/c on hold till Monday as per CW   Discussed w/ RN.

## 2018-12-02 LAB
CULTURE RESULTS: SIGNIFICANT CHANGE UP
SPECIMEN SOURCE: SIGNIFICANT CHANGE UP

## 2018-12-02 RX ADMIN — Medication 3 MILLILITER(S): at 01:52

## 2018-12-02 RX ADMIN — Medication 500 MILLIGRAM(S): at 17:00

## 2018-12-02 RX ADMIN — Medication 500 MILLIGRAM(S): at 05:39

## 2018-12-02 RX ADMIN — Medication 3 MILLILITER(S): at 21:23

## 2018-12-02 RX ADMIN — Medication 3 MILLILITER(S): at 14:25

## 2018-12-02 RX ADMIN — Medication 3 MILLILITER(S): at 09:21

## 2018-12-02 RX ADMIN — Medication 40 MILLIGRAM(S): at 05:38

## 2018-12-02 RX ADMIN — Medication 1200 MILLIGRAM(S): at 17:00

## 2018-12-02 RX ADMIN — Medication 1200 MILLIGRAM(S): at 05:39

## 2018-12-02 RX ADMIN — ENOXAPARIN SODIUM 40 MILLIGRAM(S): 100 INJECTION SUBCUTANEOUS at 09:21

## 2018-12-02 NOTE — PROGRESS NOTE ADULT - SUBJECTIVE AND OBJECTIVE BOX
Patient is a 63y old  Male who presents with a chief complaint of Shortness of Breath, Wheezing, and Cough (27 Nov 2018 08:49)      HPI:  62 y/o M PMHx significant for COPD, long-standing hx of tobacco use, and hepatitis C presents to the ED c/o shortness of breath, wheezing, and productive cough worse over the last 1-2 days. In triage VS => /min, RR 22/min. Labs => WBC 25, HCO3 34, Lactate 1.1, RVP (-). In the ED the patient was given Levofloxacin 500mg IVPB x 1, Magnesium sulfate 1g IVPB x 1, Methylprednisolone 125mg IVP x 1, NS x 1.5L, and Combivent nebs x 3. CT Chest => 1. Centrilobular and paraseptal emphysema. 2. Scattered tree in bud opacities. Bilateral bronchial wall thickening. Findings compatible with nonspecific bronchiolitis. (26 Nov 2018 22:41)  PAST MEDICAL & SURGICAL HISTORY:  COPD (chronic obstructive pulmonary disease)  Hepatitis C  No significant past surgical history    12/2/18  No acute pulmonary events occurred overnight   Patient resting  Denies any cough/dyspnea that has worsened, states he has improved since admission    PREVIOUS DIAGNOSTIC TESTING:      MEDICATIONS  (STANDING):  ALBUTerol/ipratropium for Nebulization 3 milliLiter(s) Nebulizer every 6 hours  cefuroxime   Tablet 500 milliGRAM(s) Oral every 12 hours  enoxaparin Injectable 40 milliGRAM(s) SubCutaneous every 24 hours  guaiFENesin ER 1200 milliGRAM(s) Oral every 12 hours  nicotine - 21 mG/24Hr(s) Patch 1 patch Transdermal daily  predniSONE   Tablet 40 milliGRAM(s) Oral daily  tiotropium 18 MICROgram(s) Capsule 1 Capsule(s) Inhalation daily    MEDICATIONS  (PRN):  acetaminophen   Tablet .. 650 milliGRAM(s) Oral every 6 hours PRN Temp greater or equal to 38C (100.4F), Mild Pain (1 - 3)  benzonatate 100 milliGRAM(s) Oral three times a day PRN Cough  docusate sodium 100 milliGRAM(s) Oral three times a day PRN Constipation  ondansetron Injectable 4 milliGRAM(s) IV Push every 6 hours PRN Nausea  senna 2 Tablet(s) Oral at bedtime PRN Constipation    MEDICATIONS  (PRN):  acetaminophen   Tablet .. 650 milliGRAM(s) Oral every 6 hours PRN Temp greater or equal to 38C (100.4F), Mild Pain (1 - 3)  benzonatate 100 milliGRAM(s) Oral three times a day PRN Cough  docusate sodium 100 milliGRAM(s) Oral three times a day PRN Constipation  ondansetron Injectable 4 milliGRAM(s) IV Push every 6 hours PRN Nausea  senna 2 Tablet(s) Oral at bedtime PRN Constipation        FAMILY HISTORY:  Family history of diabetes mellitus (Father)      SOCIAL HISTORY:  ***    REVIEW OF SYSTEM:  cough, dyspnea, otherwise 12 point ROS negative     Vital Signs Last 24 Hrs  T(C): 36.3 (02 Dec 2018 05:36), Max: 37.1 (01 Dec 2018 11:30)  T(F): 97.4 (02 Dec 2018 05:36), Max: 98.8 (01 Dec 2018 11:30)  HR: 93 (02 Dec 2018 05:36) (75 - 107)  BP: 127/71 (02 Dec 2018 05:36) (118/71 - 136/74)  BP(mean): --  RR: 16 (02 Dec 2018 05:36) (16 - 17)  SpO2: 95% (02 Dec 2018 05:36) (95% - 100%)    I&O's Summary    PHYSICAL EXAM  General Appearance: cooperative, no acute distress,   HEENT: PERRL, conjunctiva clear, EOM's intact, non injected pharynx, no exudate, TM   normal  Neck: Supple, , no adenopathy, thyroid: not enlarged, no carotid bruit or JVD  Back: Symmetric, no  tenderness,no soft tissue tenderness  Lungs: Diminished bilaterally   Heart: Regular rate and rhythm, S1, S2 normal, no murmur, rub or gallop  Abdomen: Soft, non-tender, bowel sounds active , no hepatosplenomegaly  Extremities: no cyanosis or edema, no joint swelling  Skin: Skin color, texture normal, no rashes   Neurologic: Alert and oriented X3 , cranial nerves intact, sensory and motor normal,    ECG:    LABS:                          13.0   26.63 )-----------( 284      ( 27 Nov 2018 07:20 )             39.5     11-27    142  |  105  |  12  ----------------------------<  135<H>  3.8   |  30  |  0.67    Ca    8.2<L>      27 Nov 2018 07:20  Mg     2.3     11-27    TPro  7.2  /  Alb  2.4<L>  /  TBili  0.3  /  DBili  x   /  AST  17  /  ALT  21  /  AlkPhos  85  11-27    CARDIAC MARKERS ( 26 Nov 2018 17:47 )  <0.015 ng/mL / x     / x     / x     / x            Pro BNP  272 11-26 @ 17:47  D Dimer  -- 11-26 @ 17:47    PT/INR - ( 26 Nov 2018 17:47 )   PT: 13.7 sec;   INR: 1.23 ratio         PTT - ( 26 Nov 2018 17:47 )  PTT:28.4 sec    ABG - ( 27 Nov 2018 01:18 )  pH, Arterial: 7.46  pH, Blood: x     /  pCO2: 41    /  pO2: 71    / HCO3: 29    / Base Excess: 5.0   /  SaO2: 94                    RADIOLOGY & ADDITIONAL STUDIES:

## 2018-12-02 NOTE — PROGRESS NOTE ADULT - ASSESSMENT
1) Acute COPD Exacerbation  2) Dyspnea  3) Bronchiolitis  4) Abnormal CT Chest  5) Centrilobular Emphysema  6) Bullous Disease       64 y/o M PMHx significant for COPD, long-standing hx of tobacco use, and hepatitis C presents to the ED c/o shortness of breath, wheezing, and productive cough worse over the last 1-2 days.  Patient was scheduled for an appointment with me in the office but was too sick to come in and went to his primary care physician in one to the ER directly.  He was supposed to see me on December 5.  On examination he has diminished breath sounds bilaterally  At the present time he is on DuoNeb azithromycin and ceftriaxone and methylprednisolone 40 mg IV every 8 hours  Will continue this regimen for now  Reviewed ABG, shows no evidence of hypoxemia but will walk patient and if he desaturates, will need O2.  Will continue to monitor, overall respiratory status is improving  Prednisone 40mg X 5 days   Awaiting placement  Will continue to follow

## 2018-12-03 RX ADMIN — Medication 1200 MILLIGRAM(S): at 05:00

## 2018-12-03 RX ADMIN — Medication 3 MILLILITER(S): at 02:07

## 2018-12-03 RX ADMIN — Medication 500 MILLIGRAM(S): at 18:58

## 2018-12-03 RX ADMIN — Medication 1 PATCH: at 11:10

## 2018-12-03 RX ADMIN — Medication 3 MILLILITER(S): at 08:14

## 2018-12-03 RX ADMIN — Medication 3 MILLILITER(S): at 21:14

## 2018-12-03 RX ADMIN — Medication 1200 MILLIGRAM(S): at 18:58

## 2018-12-03 RX ADMIN — Medication 500 MILLIGRAM(S): at 05:00

## 2018-12-03 RX ADMIN — ENOXAPARIN SODIUM 40 MILLIGRAM(S): 100 INJECTION SUBCUTANEOUS at 11:10

## 2018-12-03 RX ADMIN — Medication 40 MILLIGRAM(S): at 05:00

## 2018-12-03 RX ADMIN — Medication 3 MILLILITER(S): at 13:57

## 2018-12-03 RX ADMIN — Medication 1 PATCH: at 19:01

## 2018-12-03 NOTE — PROGRESS NOTE ADULT - SUBJECTIVE AND OBJECTIVE BOX
Patient is a 63y old  Male who presents with a chief complaint of Shortness of Breath, Wheezing, and Cough (27 Nov 2018 08:49)      HPI:  64 y/o M PMHx significant for COPD, long-standing hx of tobacco use, and hepatitis C presents to the ED c/o shortness of breath, wheezing, and productive cough worse over the last 1-2 days. In triage VS => /min, RR 22/min. Labs => WBC 25, HCO3 34, Lactate 1.1, RVP (-). In the ED the patient was given Levofloxacin 500mg IVPB x 1, Magnesium sulfate 1g IVPB x 1, Methylprednisolone 125mg IVP x 1, NS x 1.5L, and Combivent nebs x 3. CT Chest => 1. Centrilobular and paraseptal emphysema. 2. Scattered tree in bud opacities. Bilateral bronchial wall thickening. Findings compatible with nonspecific bronchiolitis. (26 Nov 2018 22:41)  PAST MEDICAL & SURGICAL HISTORY:  COPD (chronic obstructive pulmonary disease)  Hepatitis C  No significant past surgical history    12/2/18  No acute pulmonary events occurred overnight   Patient resting  Denies any cough/dyspnea that has worsened, states he has improved since admission    12/3/18  Patient feeling better     PREVIOUS DIAGNOSTIC TESTING:      MEDICATIONS  (STANDING):  ALBUTerol/ipratropium for Nebulization 3 milliLiter(s) Nebulizer every 6 hours  cefuroxime   Tablet 500 milliGRAM(s) Oral every 12 hours  enoxaparin Injectable 40 milliGRAM(s) SubCutaneous every 24 hours  guaiFENesin ER 1200 milliGRAM(s) Oral every 12 hours  nicotine - 21 mG/24Hr(s) Patch 1 patch Transdermal daily  predniSONE   Tablet 40 milliGRAM(s) Oral daily  tiotropium 18 MICROgram(s) Capsule 1 Capsule(s) Inhalation daily    MEDICATIONS  (PRN):  acetaminophen   Tablet .. 650 milliGRAM(s) Oral every 6 hours PRN Temp greater or equal to 38C (100.4F), Mild Pain (1 - 3)  benzonatate 100 milliGRAM(s) Oral three times a day PRN Cough  docusate sodium 100 milliGRAM(s) Oral three times a day PRN Constipation  ondansetron Injectable 4 milliGRAM(s) IV Push every 6 hours PRN Nausea  senna 2 Tablet(s) Oral at bedtime PRN Constipation        FAMILY HISTORY:  Family history of diabetes mellitus (Father)      SOCIAL HISTORY:  ***    REVIEW OF SYSTEM:  cough, dyspnea, otherwise 12 point ROS negative     Vital Signs Last 24 Hrs  T(C): 36.3 (02 Dec 2018 05:36), Max: 37.1 (01 Dec 2018 11:30)  T(F): 97.4 (02 Dec 2018 05:36), Max: 98.8 (01 Dec 2018 11:30)  HR: 93 (02 Dec 2018 05:36) (75 - 107)  BP: 127/71 (02 Dec 2018 05:36) (118/71 - 136/74)  BP(mean): --  RR: 16 (02 Dec 2018 05:36) (16 - 17)  SpO2: 95% (02 Dec 2018 05:36) (95% - 100%)    I&O's Summary    PHYSICAL EXAM  General Appearance: cooperative, no acute distress,   HEENT: PERRL, conjunctiva clear, EOM's intact, non injected pharynx, no exudate, TM   normal  Neck: Supple, , no adenopathy, thyroid: not enlarged, no carotid bruit or JVD  Back: Symmetric, no  tenderness,no soft tissue tenderness  Lungs: Diminished bilaterally   Heart: Regular rate and rhythm, S1, S2 normal, no murmur, rub or gallop  Abdomen: Soft, non-tender, bowel sounds active , no hepatosplenomegaly  Extremities: no cyanosis or edema, no joint swelling  Skin: Skin color, texture normal, no rashes   Neurologic: Alert and oriented X3 , cranial nerves intact, sensory and motor normal,    ECG:    LABS:                          13.0   26.63 )-----------( 284      ( 27 Nov 2018 07:20 )             39.5     11-27    142  |  105  |  12  ----------------------------<  135<H>  3.8   |  30  |  0.67    Ca    8.2<L>      27 Nov 2018 07:20  Mg     2.3     11-27    TPro  7.2  /  Alb  2.4<L>  /  TBili  0.3  /  DBili  x   /  AST  17  /  ALT  21  /  AlkPhos  85  11-27    CARDIAC MARKERS ( 26 Nov 2018 17:47 )  <0.015 ng/mL / x     / x     / x     / x            Pro BNP  272 11-26 @ 17:47  D Dimer  -- 11-26 @ 17:47    PT/INR - ( 26 Nov 2018 17:47 )   PT: 13.7 sec;   INR: 1.23 ratio         PTT - ( 26 Nov 2018 17:47 )  PTT:28.4 sec    ABG - ( 27 Nov 2018 01:18 )  pH, Arterial: 7.46  pH, Blood: x     /  pCO2: 41    /  pO2: 71    / HCO3: 29    / Base Excess: 5.0   /  SaO2: 94                    RADIOLOGY & ADDITIONAL STUDIES:

## 2018-12-03 NOTE — PROGRESS NOTE ADULT - SUBJECTIVE AND OBJECTIVE BOX
CC: Cough  HPI: 64 y/o M PMHx significant for COPD, long-standing hx of tobacco use, and hepatitis C presented  to the ED c/o shortness of breath, wheezing, and productive cough worse over the last 1-2 days PTA.  He  start feeling sick over 1 weeks before ER visit.    In triage VS => /min, RR 22/min. Labs => WBC 25, HCO3 34, Lactate 1.1, RVP (-). In the ED the patient was given Levofloxacin 500mg IVPB x 1, Magnesium sulfate 1g IVPB x 1, Methylprednisolone 125mg IVP x 1, NS x 1.5L, and Combivent nebs x 3. CT Chest => 1. Centrilobular and paraseptal emphysema. 2. Scattered tree in bud opacities. Bilateral bronchial wall thickening. Findings compatible with nonspecific bronchiolitis.  Pt was admitted to the hospital. Was evaluated by Pulm . Clinically improved on IV Abxs and Steroids. Today reports SOB and cough improving, ambulated in the hallway off O2 with stable pulse ox, no significant SOB. D/c planning, meds and outPt f/u discussed. Pt was planned for d/c to shelter, but as per SW/CM did not get answer  from facility, will have to hold d/c until Monday 12/1: Chart reviewed, mild productive cough, no fevers. Feels slightly weak.   12/2: No complaints, mildly productive cough noted, no fevers/ chills.     12/3: No complaints, no pain. Cough decreasing.   ROS: neg unless stated above.     Vital Signs Last 24 Hrs  T(C): 37.2 (03 Dec 2018 12:09), Max: 37.2 (03 Dec 2018 12:09)  T(F): 98.9 (03 Dec 2018 12:09), Max: 98.9 (03 Dec 2018 12:09)  HR: 63 (03 Dec 2018 12:09) (63 - 92)  BP: 134/71 (03 Dec 2018 12:09) (125/77 - 138/80)  BP(mean): --  RR: 16 (03 Dec 2018 12:09) (16 - 17)  SpO2: 98% (03 Dec 2018 12:09) (95% - 100%)    PHYSICAL EXAM:  General: Well developed;  mildly dyspneic on prolonged conversation, at his baseline.   Eyes: PERRLA, EOMI; conjunctiva and sclera clear  Head: Normocephalic; atraumatic  ENMT: No nasal discharge; airway clear  Neck: Supple; no JVD   Respiratory: Better air entry today, decreased BS at bases,  no rhonchi or rales  Cardiovascular: Regular rate and rhythm. S1 and S2 Normal; No murmurs  Gastrointestinal: Soft non-tender non-distended; Normal bowel sounds  Genitourinary: No costovertebral angle tenderness  Extremities: Normal range of motion, No edema, right forearm with mobile soft tissue mass likely lipoma, nontender.   Vascular: Peripheral pulses palpable 2+ bilaterally  Neurological: Alert and oriented x4, non focal   Skin: Warm and dry. No acute rash  Lymph Nodes: No acute cervical adenopathy  Musculoskeletal: Normal muscle tone, without deformities  Psychiatric: Cooperative and appropriate                            12.6   23.71 )-----------( 301      ( 30 Nov 2018 06:33 )             38.8   MEDICATIONS  (STANDING):  ALBUTerol/ipratropium for Nebulization 3 milliLiter(s) Nebulizer every 6 hours  cefuroxime   Tablet 500 milliGRAM(s) Oral every 12 hours  enoxaparin Injectable 40 milliGRAM(s) SubCutaneous every 24 hours  guaiFENesin ER 1200 milliGRAM(s) Oral every 12 hours  nicotine - 21 mG/24Hr(s) Patch 1 patch Transdermal daily  predniSONE   Tablet 40 milliGRAM(s) Oral daily  tiotropium 18 MICROgram(s) Capsule 1 Capsule(s) Inhalation daily    A/P:      64 y/o M PMHx significant for COPD, long-standing hx of tobacco use, and hepatitis C admitted for:     1. Acute hypoxic Respiratory failure due to COPD exacerbation and Community acquired pneumonia.  Acute Bronchiolitis. Severe emphysema   - clinically better   - CT chest reviewed: pulmonary nodule 8mm cystic LLL --> RECOMMEND 6 month f/u repeat CT Chest. DISCUSSED WITH PATIENT  - ABG reviewed   - RVP neg   - neg. BCXs   - send Legionella/Strep pneumo urine Ag: negative   - s/p IV abx, now on Ceftin x 5 days day 4/5  - Repeat CXR   with improvement  - C/w Spiriva, Albuterol and Symbicort   -F/u with Dr Herron     2.  Chronic hepatitis C  - not currently on treatment.   - LFTs WNL  - F/u with PCP for further management     3.  Leukocytosis, initially likely due to PNA  - still elevated likely due to steroids, star trending down   - no fevers  - repeat labs with PCP next week     4. DVT PPxs : On Lovenox     Dispo:  Stable for d/c today but not able to get placement to shelter, d/c on hold pending acceptance from shelter.   Discussed w/ RN and SW

## 2018-12-03 NOTE — PROGRESS NOTE ADULT - ASSESSMENT
1) Acute COPD Exacerbation  2) Dyspnea  3) Bronchiolitis  4) Abnormal CT Chest  5) Centrilobular Emphysema  6) Bullous Disease       62 y/o M PMHx significant for COPD, long-standing hx of tobacco use, and hepatitis C presents to the ED c/o shortness of breath, wheezing, and productive cough worse over the last 1-2 days.  Patient was scheduled for an appointment with me in the office but was too sick to come in and went to his primary care physician in one to the ER directly.  He was supposed to see me on December 5.  On examination he has diminished breath sounds bilaterally  At the present time he is on DuoNeb azithromycin and ceftriaxone and methylprednisolone 40 mg IV every 8 hours  Will continue this regimen for now  Reviewed ABG, shows no evidence of hypoxemia but will walk patient and if he desaturates, will need O2.  Will continue to monitor, overall respiratory status is improving  Will stop Prednisone today  Awaiting placement  Will continue to follow

## 2018-12-04 DIAGNOSIS — Z71.89 OTHER SPECIFIED COUNSELING: ICD-10-CM

## 2018-12-04 RX ADMIN — Medication 1 PATCH: at 11:03

## 2018-12-04 RX ADMIN — ENOXAPARIN SODIUM 40 MILLIGRAM(S): 100 INJECTION SUBCUTANEOUS at 11:23

## 2018-12-04 RX ADMIN — Medication 1200 MILLIGRAM(S): at 05:26

## 2018-12-04 RX ADMIN — Medication 500 MILLIGRAM(S): at 05:26

## 2018-12-04 RX ADMIN — Medication 1200 MILLIGRAM(S): at 18:27

## 2018-12-04 RX ADMIN — Medication 1 PATCH: at 11:23

## 2018-12-04 RX ADMIN — Medication 3 MILLILITER(S): at 20:44

## 2018-12-04 RX ADMIN — TIOTROPIUM BROMIDE 1 CAPSULE(S): 18 CAPSULE ORAL; RESPIRATORY (INHALATION) at 13:39

## 2018-12-04 RX ADMIN — Medication 1 PATCH: at 18:28

## 2018-12-04 RX ADMIN — Medication 3 MILLILITER(S): at 05:03

## 2018-12-04 RX ADMIN — Medication 1 PATCH: at 11:04

## 2018-12-04 RX ADMIN — Medication 3 MILLILITER(S): at 13:38

## 2018-12-04 RX ADMIN — Medication 500 MILLIGRAM(S): at 18:27

## 2018-12-04 NOTE — PROGRESS NOTE ADULT - SUBJECTIVE AND OBJECTIVE BOX
CC: Cough  HPI: 64 y/o M PMHx significant for COPD, long-standing hx of tobacco use, and hepatitis C presented  to the ED c/o shortness of breath, wheezing, and productive cough worse over the last 1-2 days PTA.  He  start feeling sick over 1 weeks before ER visit.    In triage VS => /min, RR 22/min. Labs => WBC 25, HCO3 34, Lactate 1.1, RVP (-). In the ED the patient was given Levofloxacin 500mg IVPB x 1, Magnesium sulfate 1g IVPB x 1, Methylprednisolone 125mg IVP x 1, NS x 1.5L, and Combivent nebs x 3. CT Chest => 1. Centrilobular and paraseptal emphysema. 2. Scattered tree in bud opacities. Bilateral bronchial wall thickening. Findings compatible with nonspecific bronchiolitis.  Pt was admitted to the hospital. Was evaluated by Pulm . Clinically improved on IV Abxs and Steroids. Today reports SOB and cough improving, ambulated in the hallway off O2 with stable pulse ox, no significant SOB. D/c planning, meds and outPt f/u discussed. Pt was planned for d/c to shelter, but as per SW/CM did not get answer  from facility, will have to hold d/c until Monday 12/1: Chart reviewed, mild productive cough, no fevers. Feels slightly weak.   12/2: No complaints, mildly productive cough noted, no fevers/ chills.     12/3: No complaints, no pain. Cough decreasing.     12/4: Seen this AM, mild lingering dry cough. No fevers.   ROS: neg unless stated above.     Vital Signs Last 24 Hrs  T(C): 36.6 (04 Dec 2018 11:46), Max: 36.9 (03 Dec 2018 21:58)  T(F): 97.8 (04 Dec 2018 11:46), Max: 98.4 (03 Dec 2018 21:58)  HR: 73 (04 Dec 2018 13:39) (73 - 96)  BP: 128/72 (04 Dec 2018 11:46) (123/77 - 136/77)  BP(mean): --  RR: 16 (04 Dec 2018 11:46) (16 - 17)  SpO2: 98% (04 Dec 2018 13:39) (95% - 99%)    PHYSICAL EXAM:  General: Well developed;  mildly dyspneic on prolonged conversation, at his baseline.   Eyes: PERRLA, EOMI; conjunctiva and sclera clear  Head: Normocephalic; atraumatic  ENMT: No nasal discharge; airway clear  Neck: Supple; no JVD   Respiratory: Better air entry today, decreased BS at bases,  no rhonchi or rales  Cardiovascular: Regular rate and rhythm. S1 and S2 Normal; No murmurs  Gastrointestinal: Soft non-tender non-distended; Normal bowel sounds  Genitourinary: No costovertebral angle tenderness  Extremities: Normal range of motion, No edema, right forearm with mobile soft tissue mass likely lipoma, nontender.   Vascular: Peripheral pulses palpable 2+ bilaterally  Neurological: Alert and oriented x4, non focal   Skin: Warm and dry. No acute rash  Lymph Nodes: No acute cervical adenopathy  Musculoskeletal: Normal muscle tone, without deformities  Psychiatric: Cooperative and appropriate    MEDICATIONS  (STANDING):  ALBUTerol/ipratropium for Nebulization 3 milliLiter(s) Nebulizer every 6 hours  cefuroxime   Tablet 500 milliGRAM(s) Oral every 12 hours  enoxaparin Injectable 40 milliGRAM(s) SubCutaneous every 24 hours  guaiFENesin ER 1200 milliGRAM(s) Oral every 12 hours  nicotine - 21 mG/24Hr(s) Patch 1 patch Transdermal daily  tiotropium 18 MICROgram(s) Capsule 1 Capsule(s) Inhalation daily        A/P:      64 y/o M PMHx significant for COPD, long-standing hx of tobacco use, and hepatitis C admitted for:     1. Acute hypoxic Respiratory failure due to COPD exacerbation and Community acquired pneumonia.  Acute Bronchiolitis. Severe emphysema   - clinically better   - CT chest reviewed: pulmonary nodule 8mm cystic LLL --> RECOMMEND 6 month f/u repeat CT Chest. DISCUSSED WITH PATIENT  - ABG reviewed   - RVP neg   - neg. BCXs   - send Legionella/Strep pneumo urine Ag: negative   - s/p IV abx, now on Ceftin x 5 days day 5/5 - complete abx today.   - Repeat CXR   with improvement  - C/w Spiriva, Albuterol and Symbicort   -F/u with Dr Herron     2.  Chronic hepatitis C  - not currently on treatment.   - LFTs WNL  - F/u with PCP for further management     3.  Leukocytosis, initially likely due to PNA  - still elevated likely due to steroids, star trending down   - no fevers  - repeat labs with PCP next week     4. DVT PPxs : On Lovenox     Dispo:  Stable for d/c today but not able to get placement to shelter, d/c on hold pending acceptance from shelter.   Discussed w/ RN and SW and Pulm. No further steroids needed.

## 2018-12-05 VITALS
HEART RATE: 99 BPM | RESPIRATION RATE: 16 BRPM | OXYGEN SATURATION: 97 % | SYSTOLIC BLOOD PRESSURE: 125 MMHG | TEMPERATURE: 98 F | DIASTOLIC BLOOD PRESSURE: 84 MMHG

## 2018-12-05 LAB
CULTURE RESULTS: SIGNIFICANT CHANGE UP
SPECIMEN SOURCE: SIGNIFICANT CHANGE UP

## 2018-12-05 RX ADMIN — TIOTROPIUM BROMIDE 1 CAPSULE(S): 18 CAPSULE ORAL; RESPIRATORY (INHALATION) at 07:53

## 2018-12-05 RX ADMIN — Medication 1200 MILLIGRAM(S): at 05:07

## 2018-12-05 RX ADMIN — ENOXAPARIN SODIUM 40 MILLIGRAM(S): 100 INJECTION SUBCUTANEOUS at 11:40

## 2018-12-05 RX ADMIN — Medication 500 MILLIGRAM(S): at 05:06

## 2018-12-05 RX ADMIN — Medication 1 PATCH: at 07:17

## 2018-12-05 RX ADMIN — Medication 1 PATCH: at 11:40

## 2018-12-05 RX ADMIN — Medication 3 MILLILITER(S): at 13:27

## 2018-12-05 RX ADMIN — Medication 3 MILLILITER(S): at 07:52

## 2018-12-05 RX ADMIN — Medication 3 MILLILITER(S): at 01:43

## 2018-12-05 NOTE — PROGRESS NOTE ADULT - REASON FOR ADMISSION
Shortness of Breath, Wheezing, and Cough

## 2018-12-05 NOTE — PROGRESS NOTE ADULT - PROVIDER SPECIALTY LIST ADULT
Hospitalist
Pulmonology
Hospitalist
Hospitalist

## 2018-12-05 NOTE — PROGRESS NOTE ADULT - SUBJECTIVE AND OBJECTIVE BOX
CC: Cough  HPI: 62 y/o M PMHx significant for COPD, long-standing hx of tobacco use, and hepatitis C presented  to the ED c/o shortness of breath, wheezing, and productive cough worse over the last 1-2 days PTA.  He  start feeling sick over 1 weeks before ER visit.    In triage VS => /min, RR 22/min. Labs => WBC 25, HCO3 34, Lactate 1.1, RVP (-). In the ED the patient was given Levofloxacin 500mg IVPB x 1, Magnesium sulfate 1g IVPB x 1, Methylprednisolone 125mg IVP x 1, NS x 1.5L, and Combivent nebs x 3. CT Chest => 1. Centrilobular and paraseptal emphysema. 2. Scattered tree in bud opacities. Bilateral bronchial wall thickening. Findings compatible with nonspecific bronchiolitis.  Pt was admitted to the hospital. Was evaluated by Pulm . Clinically improved on IV Abxs and Steroids. Today reports SOB and cough improving, ambulated in the hallway off O2 with stable pulse ox, no significant SOB. D/c planning, meds and outPt f/u discussed. Pt was planned for d/c to shelter, but as per SW/CM did not get answer  from facility, will have to hold d/c until Monday 12/1: Chart reviewed, mild productive cough, no fevers. Feels slightly weak.   12/2: No complaints, mildly productive cough noted, no fevers/ chills.   12/3: No complaints, no pain. Cough decreasing.   12/4: Seen this AM, mild lingering dry cough. No fevers.     12/5: Stable, mild chronic cough, no fevers. Now done with abx, will plan to go to his ex-wife's residence if shelter not accepting.   ROS: neg unless stated above.     Vital Signs Last 24 Hrs  T(C): 36.4 (05 Dec 2018 11:56), Max: 36.6 (04 Dec 2018 21:12)  T(F): 97.5 (05 Dec 2018 11:56), Max: 97.8 (04 Dec 2018 21:12)  HR: 99 (05 Dec 2018 11:56) (70 - 99)  BP: 125/84 (05 Dec 2018 11:56) (122/66 - 125/84)  BP(mean): --  RR: 16 (05 Dec 2018 11:56) (16 - 17)  SpO2: 97% (05 Dec 2018 11:56) (94% - 98%)    PHYSICAL EXAM:  General: Well developed;  thin appears comfortable.   mildly dyspneic on prolonged conversation, at his baseline.   Eyes: PERRLA, EOMI; conjunctiva and sclera clear  Head: Normocephalic; atraumatic  ENMT: No nasal discharge; airway clear  Neck: Supple; no JVD   Respiratory: Better air entry today, decreased BS at bases,  no rhonchi or rales  Cardiovascular: Regular rate and rhythm. S1 and S2 Normal; No murmurs  Gastrointestinal: Soft non-tender non-distended; Normal bowel sounds  Genitourinary: No costovertebral angle tenderness  Extremities: Normal range of motion, No edema, right forearm with mobile soft tissue mass likely lipoma, nontender.   Vascular: Peripheral pulses palpable 2+ bilaterally  Neurological: Alert and oriented x4, non focal   Skin: Warm and dry. No acute rash  Lymph Nodes: No acute cervical adenopathy  Musculoskeletal: Normal muscle tone, without deformities  Psychiatric: Cooperative and appropriate    MEDICATIONS  (STANDING):  ALBUTerol/ipratropium for Nebulization 3 milliLiter(s) Nebulizer every 6 hours  cefuroxime   Tablet 500 milliGRAM(s) Oral every 12 hours  enoxaparin Injectable 40 milliGRAM(s) SubCutaneous every 24 hours  guaiFENesin ER 1200 milliGRAM(s) Oral every 12 hours  nicotine - 21 mG/24Hr(s) Patch 1 patch Transdermal daily  tiotropium 18 MICROgram(s) Capsule 1 Capsule(s) Inhalation daily    A/P:    62 y/o M PMHx significant for COPD, long-standing hx of tobacco use, and hepatitis C admitted for:     1. Acute hypoxic Respiratory failure due to COPD exacerbation and Community acquired pneumonia.  Acute Bronchiolitis. Severe emphysema   - clinically better   - CT chest reviewed: pulmonary nodule 8mm cystic LLL --> RECOMMEND 6 month f/u repeat CT Chest. DISCUSSED WITH PATIENT  - ABG reviewed   - RVP neg   - neg. BCXs   - send Legionella/Strep pneumo urine Ag: negative   - s/p IV abx, now on Ceftin x 5 days day 5/5 - complete abx today.   - Repeat CXR   with improvement  - C/w Spiriva, Albuterol and Symbicort   -F/u with Dr Herron     2.  Chronic hepatitis C  - not currently on treatment.   - LFTs WNL  - F/u with PCP for further management     3.  Leukocytosis, initially likely due to PNA  - still elevated likely due to steroids, star trending down   - no fevers  - repeat labs with PCP next week     4. DVT PPxs : On Lovenox     Dispo:  Stable for d/c today, SW to assist. Plans to set up care with PCP Dr. Najera, will call and leave message regarding patient.   Discussed w/ RN and SW and Pulm. No further steroids needed.

## 2018-12-08 DIAGNOSIS — J96.01 ACUTE RESPIRATORY FAILURE WITH HYPOXIA: ICD-10-CM

## 2018-12-08 DIAGNOSIS — B18.2 CHRONIC VIRAL HEPATITIS C: ICD-10-CM

## 2018-12-08 DIAGNOSIS — J18.9 PNEUMONIA, UNSPECIFIED ORGANISM: ICD-10-CM

## 2018-12-08 DIAGNOSIS — J44.1 CHRONIC OBSTRUCTIVE PULMONARY DISEASE WITH (ACUTE) EXACERBATION: ICD-10-CM

## 2018-12-08 DIAGNOSIS — J21.9 ACUTE BRONCHIOLITIS, UNSPECIFIED: ICD-10-CM

## 2018-12-08 DIAGNOSIS — J98.4 OTHER DISORDERS OF LUNG: ICD-10-CM

## 2018-12-08 DIAGNOSIS — J44.0 CHRONIC OBSTRUCTIVE PULMONARY DISEASE WITH (ACUTE) LOWER RESPIRATORY INFECTION: ICD-10-CM

## 2018-12-08 DIAGNOSIS — F17.210 NICOTINE DEPENDENCE, CIGARETTES, UNCOMPLICATED: ICD-10-CM

## 2018-12-13 NOTE — PATIENT PROFILE ADULT. - PRESSURE ULCER(S)
Telephone Encounter by Polly Hendrickson NCMA at 01/30/17 01:49 PM     Author:  Polly Hendrickson NCMA Service:  (none) Author Type:  Certified Medical Assistant     Filed:  01/30/17 01:49 PM Encounter Date:  1/30/2017 Status:  Signed     :  Polly Hendrickson NCMA (Certified Medical Assistant)            Left message on answering machine to call back. Patient is due for 3rd HPV. Is this the immunization mother is thinking patient needs?[AH1.1C]       Revision History        User Key Date/Time User Provider Type Action    > AH1.1 01/30/17 01:49 PM Polly Hendrickson NCMA Certified Medical Assistant Sign    C - Copied             no

## 2019-03-08 NOTE — H&P ADULT - NSHPSOCIALHISTORY_GEN_ALL_CORE
Detail Level: Detailed
Detail Level: Zone
(+)Smoker  Denies EtOH or illicit drug abuse  Lives at home
Detail Level: Simple

## 2019-06-01 PROCEDURE — G9005: CPT

## 2020-01-01 ENCOUNTER — RESULT REVIEW (OUTPATIENT)
Age: 65
End: 2020-01-01

## 2020-01-01 ENCOUNTER — APPOINTMENT (OUTPATIENT)
Dept: HEMATOLOGY ONCOLOGY | Facility: CLINIC | Age: 65
End: 2020-01-01
Payer: MEDICARE

## 2020-01-01 ENCOUNTER — RESULT CHARGE (OUTPATIENT)
Age: 65
End: 2020-01-01

## 2020-01-01 ENCOUNTER — OUTPATIENT (OUTPATIENT)
Dept: OUTPATIENT SERVICES | Facility: HOSPITAL | Age: 65
LOS: 1 days | Discharge: HOME | End: 2020-01-01

## 2020-01-01 ENCOUNTER — TRANSCRIPTION ENCOUNTER (OUTPATIENT)
Age: 65
End: 2020-01-01

## 2020-01-01 ENCOUNTER — APPOINTMENT (OUTPATIENT)
Dept: INTERNAL MEDICINE | Facility: CLINIC | Age: 65
End: 2020-01-01

## 2020-01-01 ENCOUNTER — APPOINTMENT (OUTPATIENT)
Dept: INTERNAL MEDICINE | Facility: CLINIC | Age: 65
End: 2020-01-01
Payer: MEDICARE

## 2020-01-01 ENCOUNTER — APPOINTMENT (OUTPATIENT)
Dept: INFUSION THERAPY | Facility: CLINIC | Age: 65
End: 2020-01-01
Payer: MEDICARE

## 2020-01-01 ENCOUNTER — FORM ENCOUNTER (OUTPATIENT)
Age: 65
End: 2020-01-01

## 2020-01-01 ENCOUNTER — APPOINTMENT (OUTPATIENT)
Dept: INFUSION THERAPY | Facility: CLINIC | Age: 65
End: 2020-01-01

## 2020-01-01 ENCOUNTER — OUTPATIENT (OUTPATIENT)
Dept: OUTPATIENT SERVICES | Facility: HOSPITAL | Age: 65
LOS: 1 days | Discharge: HOME | End: 2020-01-01
Payer: MEDICARE

## 2020-01-01 ENCOUNTER — NON-APPOINTMENT (OUTPATIENT)
Age: 65
End: 2020-01-01

## 2020-01-01 ENCOUNTER — APPOINTMENT (OUTPATIENT)
Dept: OTOLARYNGOLOGY | Facility: CLINIC | Age: 65
End: 2020-01-01
Payer: MEDICARE

## 2020-01-01 ENCOUNTER — APPOINTMENT (OUTPATIENT)
Dept: CARE COORDINATION | Facility: HOME HEALTH | Age: 65
End: 2020-01-01

## 2020-01-01 ENCOUNTER — INPATIENT (INPATIENT)
Facility: HOSPITAL | Age: 65
LOS: 5 days | Discharge: HOME HEALTH | DRG: 871 | End: 2020-12-14
Attending: HOSPITALIST | Admitting: HOSPITALIST
Payer: MEDICARE

## 2020-01-01 ENCOUNTER — APPOINTMENT (OUTPATIENT)
Dept: NUCLEAR MEDICINE | Facility: IMAGING CENTER | Age: 65
End: 2020-01-01
Payer: MEDICARE

## 2020-01-01 ENCOUNTER — APPOINTMENT (OUTPATIENT)
Dept: HEMATOLOGY ONCOLOGY | Facility: CLINIC | Age: 65
End: 2020-01-01

## 2020-01-01 ENCOUNTER — OUTPATIENT (OUTPATIENT)
Dept: OUTPATIENT SERVICES | Facility: HOSPITAL | Age: 65
LOS: 1 days | End: 2020-01-01
Payer: MEDICARE

## 2020-01-01 ENCOUNTER — APPOINTMENT (OUTPATIENT)
Dept: OTOLARYNGOLOGY | Facility: CLINIC | Age: 65
End: 2020-01-01

## 2020-01-01 VITALS
TEMPERATURE: 97.3 F | HEART RATE: 86 BPM | SYSTOLIC BLOOD PRESSURE: 171 MMHG | DIASTOLIC BLOOD PRESSURE: 95 MMHG | WEIGHT: 124 LBS | BODY MASS INDEX: 18.37 KG/M2 | RESPIRATION RATE: 16 BRPM | HEIGHT: 69 IN

## 2020-01-01 VITALS
HEIGHT: 69 IN | SYSTOLIC BLOOD PRESSURE: 116 MMHG | TEMPERATURE: 97.6 F | WEIGHT: 134 LBS | BODY MASS INDEX: 19.85 KG/M2 | HEART RATE: 96 BPM | DIASTOLIC BLOOD PRESSURE: 72 MMHG

## 2020-01-01 VITALS
WEIGHT: 138 LBS | SYSTOLIC BLOOD PRESSURE: 110 MMHG | RESPIRATION RATE: 16 BRPM | BODY MASS INDEX: 20.44 KG/M2 | DIASTOLIC BLOOD PRESSURE: 68 MMHG | TEMPERATURE: 98.6 F | HEART RATE: 99 BPM | HEIGHT: 69 IN

## 2020-01-01 VITALS
WEIGHT: 134.92 LBS | RESPIRATION RATE: 24 BRPM | HEIGHT: 69 IN | TEMPERATURE: 98 F | SYSTOLIC BLOOD PRESSURE: 100 MMHG | HEART RATE: 97 BPM | DIASTOLIC BLOOD PRESSURE: 61 MMHG | OXYGEN SATURATION: 83 %

## 2020-01-01 VITALS
TEMPERATURE: 97.9 F | RESPIRATION RATE: 16 BRPM | BODY MASS INDEX: 21.48 KG/M2 | WEIGHT: 145 LBS | HEART RATE: 63 BPM | DIASTOLIC BLOOD PRESSURE: 81 MMHG | HEIGHT: 69 IN | SYSTOLIC BLOOD PRESSURE: 129 MMHG

## 2020-01-01 VITALS
DIASTOLIC BLOOD PRESSURE: 78 MMHG | HEIGHT: 69 IN | HEART RATE: 92 BPM | WEIGHT: 142 LBS | SYSTOLIC BLOOD PRESSURE: 121 MMHG | BODY MASS INDEX: 21.03 KG/M2

## 2020-01-01 VITALS
WEIGHT: 136.6 LBS | BODY MASS INDEX: 20.17 KG/M2 | TEMPERATURE: 97.1 F | SYSTOLIC BLOOD PRESSURE: 107 MMHG | HEART RATE: 88 BPM | DIASTOLIC BLOOD PRESSURE: 72 MMHG

## 2020-01-01 VITALS
HEIGHT: 69 IN | TEMPERATURE: 99.4 F | HEART RATE: 98 BPM | SYSTOLIC BLOOD PRESSURE: 119 MMHG | BODY MASS INDEX: 21.68 KG/M2 | WEIGHT: 146.38 LBS | DIASTOLIC BLOOD PRESSURE: 71 MMHG | RESPIRATION RATE: 18 BRPM

## 2020-01-01 VITALS — WEIGHT: 117 LBS | HEIGHT: 69 IN | BODY MASS INDEX: 17.33 KG/M2

## 2020-01-01 VITALS
HEIGHT: 69 IN | SYSTOLIC BLOOD PRESSURE: 177 MMHG | RESPIRATION RATE: 16 BRPM | WEIGHT: 146 LBS | BODY MASS INDEX: 21.62 KG/M2 | TEMPERATURE: 97.4 F | HEART RATE: 97 BPM | DIASTOLIC BLOOD PRESSURE: 105 MMHG

## 2020-01-01 VITALS
OXYGEN SATURATION: 96 % | TEMPERATURE: 98 F | DIASTOLIC BLOOD PRESSURE: 64 MMHG | SYSTOLIC BLOOD PRESSURE: 109 MMHG | RESPIRATION RATE: 17 BRPM | HEART RATE: 89 BPM

## 2020-01-01 VITALS
SYSTOLIC BLOOD PRESSURE: 101 MMHG | DIASTOLIC BLOOD PRESSURE: 61 MMHG | TEMPERATURE: 98.7 F | WEIGHT: 140 LBS | HEART RATE: 96 BPM | BODY MASS INDEX: 20.67 KG/M2

## 2020-01-01 VITALS
SYSTOLIC BLOOD PRESSURE: 135 MMHG | BODY MASS INDEX: 18.96 KG/M2 | TEMPERATURE: 97 F | HEIGHT: 69 IN | HEART RATE: 88 BPM | WEIGHT: 128 LBS | DIASTOLIC BLOOD PRESSURE: 85 MMHG

## 2020-01-01 VITALS
DIASTOLIC BLOOD PRESSURE: 72 MMHG | TEMPERATURE: 98.7 F | HEART RATE: 98 BPM | WEIGHT: 142.7 LBS | BODY MASS INDEX: 21.07 KG/M2 | SYSTOLIC BLOOD PRESSURE: 126 MMHG

## 2020-01-01 VITALS
TEMPERATURE: 98.6 F | SYSTOLIC BLOOD PRESSURE: 116 MMHG | BODY MASS INDEX: 19.85 KG/M2 | WEIGHT: 134 LBS | DIASTOLIC BLOOD PRESSURE: 72 MMHG | HEART RATE: 96 BPM | HEIGHT: 69 IN

## 2020-01-01 DIAGNOSIS — I71.9 AORTIC ANEURYSM OF UNSPECIFIED SITE, W/OUT RUPTURE: ICD-10-CM

## 2020-01-01 DIAGNOSIS — J12.89 OTHER VIRAL PNEUMONIA: ICD-10-CM

## 2020-01-01 DIAGNOSIS — C06.2 MALIGNANT NEOPLASM OF RETROMOLAR AREA: ICD-10-CM

## 2020-01-01 DIAGNOSIS — Z87.898 PERSONAL HISTORY OF OTHER SPECIFIED CONDITIONS: ICD-10-CM

## 2020-01-01 DIAGNOSIS — E86.0 DEHYDRATION: ICD-10-CM

## 2020-01-01 DIAGNOSIS — K21.9 GASTRO-ESOPHAGEAL REFLUX DISEASE WITHOUT ESOPHAGITIS: ICD-10-CM

## 2020-01-01 DIAGNOSIS — R11.2 NAUSEA WITH VOMITING, UNSPECIFIED: ICD-10-CM

## 2020-01-01 DIAGNOSIS — C76.0 MALIGNANT NEOPLASM OF HEAD, FACE AND NECK: ICD-10-CM

## 2020-01-01 DIAGNOSIS — R13.10 DYSPHAGIA, UNSPECIFIED: ICD-10-CM

## 2020-01-01 DIAGNOSIS — J44.9 CHRONIC OBSTRUCTIVE PULMONARY DISEASE, UNSPECIFIED: ICD-10-CM

## 2020-01-01 DIAGNOSIS — I73.9 PERIPHERAL VASCULAR DISEASE, UNSPECIFIED: ICD-10-CM

## 2020-01-01 DIAGNOSIS — Z80.0 FAMILY HISTORY OF MALIGNANT NEOPLASM OF DIGESTIVE ORGANS: ICD-10-CM

## 2020-01-01 DIAGNOSIS — Z00.8 ENCOUNTER FOR OTHER GENERAL EXAMINATION: ICD-10-CM

## 2020-01-01 DIAGNOSIS — E43 UNSPECIFIED SEVERE PROTEIN-CALORIE MALNUTRITION: ICD-10-CM

## 2020-01-01 DIAGNOSIS — Z51.11 ENCOUNTER FOR ANTINEOPLASTIC CHEMOTHERAPY: ICD-10-CM

## 2020-01-01 DIAGNOSIS — K74.60 UNSPECIFIED CIRRHOSIS OF LIVER: ICD-10-CM

## 2020-01-01 DIAGNOSIS — Z83.3 FAMILY HISTORY OF DIABETES MELLITUS: ICD-10-CM

## 2020-01-01 DIAGNOSIS — F17.200 NICOTINE DEPENDENCE, UNSPECIFIED, UNCOMPLICATED: ICD-10-CM

## 2020-01-01 DIAGNOSIS — Z98.890 OTHER SPECIFIED POSTPROCEDURAL STATES: Chronic | ICD-10-CM

## 2020-01-01 DIAGNOSIS — B19.20 UNSPECIFIED VIRAL HEPATITIS C W/OUT HEPATIC COMA: ICD-10-CM

## 2020-01-01 DIAGNOSIS — K21.9 GASTRO-ESOPHAGEAL REFLUX DISEASE W/OUT ESOPHAGITIS: ICD-10-CM

## 2020-01-01 DIAGNOSIS — Z86.19 PERSONAL HISTORY OF OTHER INFECTIOUS AND PARASITIC DISEASES: ICD-10-CM

## 2020-01-01 DIAGNOSIS — J96.01 ACUTE RESPIRATORY FAILURE WITH HYPOXIA: ICD-10-CM

## 2020-01-01 DIAGNOSIS — Z87.01 PERSONAL HISTORY OF PNEUMONIA (RECURRENT): ICD-10-CM

## 2020-01-01 DIAGNOSIS — J15.9 UNSPECIFIED BACTERIAL PNEUMONIA: ICD-10-CM

## 2020-01-01 DIAGNOSIS — Z63.5 DISRUPTION OF FAMILY BY SEPARATION AND DIVORCE: ICD-10-CM

## 2020-01-01 DIAGNOSIS — C34.92 MALIGNANT NEOPLASM OF UNSPECIFIED PART OF LEFT BRONCHUS OR LUNG: ICD-10-CM

## 2020-01-01 DIAGNOSIS — A41.89 OTHER SPECIFIED SEPSIS: ICD-10-CM

## 2020-01-01 DIAGNOSIS — R91.1 SOLITARY PULMONARY NODULE: ICD-10-CM

## 2020-01-01 DIAGNOSIS — Z92.21 PERSONAL HISTORY OF ANTINEOPLASTIC CHEMOTHERAPY: ICD-10-CM

## 2020-01-01 DIAGNOSIS — U07.1 COVID-19: ICD-10-CM

## 2020-01-01 LAB
ADD ON TEST-SPECIMEN IN LAB: SIGNIFICANT CHANGE UP
ALBUMIN SERPL ELPH-MCNC: 2.1 G/DL — LOW (ref 3.3–5)
ALBUMIN SERPL ELPH-MCNC: 2.3 G/DL — LOW (ref 3.3–5)
ALBUMIN SERPL ELPH-MCNC: 4.1 G/DL
ALBUMIN SERPL ELPH-MCNC: 4.1 G/DL — SIGNIFICANT CHANGE UP (ref 3.3–5)
ALP BLD-CCNC: 99 U/L
ALP SERPL-CCNC: 61 U/L — SIGNIFICANT CHANGE UP (ref 40–120)
ALP SERPL-CCNC: 65 U/L — SIGNIFICANT CHANGE UP (ref 40–120)
ALP SERPL-CCNC: 75 U/L — SIGNIFICANT CHANGE UP (ref 40–120)
ALT FLD-CCNC: 28 U/L — SIGNIFICANT CHANGE UP (ref 10–45)
ALT FLD-CCNC: 40 U/L — SIGNIFICANT CHANGE UP (ref 12–78)
ALT FLD-CCNC: 42 U/L — SIGNIFICANT CHANGE UP (ref 12–78)
ALT SERPL-CCNC: 22 U/L
ANION GAP SERPL CALC-SCNC: 11 MMOL/L — SIGNIFICANT CHANGE UP (ref 5–17)
ANION GAP SERPL CALC-SCNC: 12 MMOL/L
ANION GAP SERPL CALC-SCNC: 12 MMOL/L — SIGNIFICANT CHANGE UP (ref 5–17)
ANION GAP SERPL CALC-SCNC: 13 MMOL/L — SIGNIFICANT CHANGE UP (ref 5–17)
ANION GAP SERPL CALC-SCNC: 16 MMOL/L — SIGNIFICANT CHANGE UP (ref 5–17)
ANION GAP SERPL CALC-SCNC: 4 MMOL/L — LOW (ref 5–17)
ANION GAP SERPL CALC-SCNC: 5 MMOL/L — SIGNIFICANT CHANGE UP (ref 5–17)
ANION GAP SERPL CALC-SCNC: 8 MMOL/L — SIGNIFICANT CHANGE UP (ref 5–17)
ANION GAP SERPL CALC-SCNC: 9 MMOL/L — SIGNIFICANT CHANGE UP (ref 5–17)
APPEARANCE UR: CLEAR — SIGNIFICANT CHANGE UP
APTT BLD: 26 SEC — LOW (ref 27.5–35.5)
AST SERPL-CCNC: 29 U/L — SIGNIFICANT CHANGE UP (ref 10–40)
AST SERPL-CCNC: 30 U/L
AST SERPL-CCNC: 50 U/L — HIGH (ref 15–37)
AST SERPL-CCNC: 58 U/L — HIGH (ref 15–37)
BASOPHILS # BLD AUTO: 0 K/UL — SIGNIFICANT CHANGE UP (ref 0–0.2)
BASOPHILS # BLD AUTO: 0.01 K/UL — SIGNIFICANT CHANGE UP (ref 0–0.2)
BASOPHILS # BLD AUTO: 0.02 K/UL — SIGNIFICANT CHANGE UP (ref 0–0.2)
BASOPHILS # BLD AUTO: 0.04 K/UL — SIGNIFICANT CHANGE UP (ref 0–0.2)
BASOPHILS # BLD AUTO: 0.04 K/UL — SIGNIFICANT CHANGE UP (ref 0–0.2)
BASOPHILS # BLD AUTO: 0.07 K/UL
BASOPHILS # BLD AUTO: 0.07 K/UL — SIGNIFICANT CHANGE UP (ref 0–0.2)
BASOPHILS NFR BLD AUTO: 0 % — SIGNIFICANT CHANGE UP (ref 0–2)
BASOPHILS NFR BLD AUTO: 0.1 % — SIGNIFICANT CHANGE UP (ref 0–2)
BASOPHILS NFR BLD AUTO: 0.1 % — SIGNIFICANT CHANGE UP (ref 0–2)
BASOPHILS NFR BLD AUTO: 0.2 % — SIGNIFICANT CHANGE UP (ref 0–2)
BASOPHILS NFR BLD AUTO: 0.3 % — SIGNIFICANT CHANGE UP (ref 0–2)
BASOPHILS NFR BLD AUTO: 0.3 % — SIGNIFICANT CHANGE UP (ref 0–2)
BASOPHILS NFR BLD AUTO: 0.4 % — SIGNIFICANT CHANGE UP (ref 0–2)
BASOPHILS NFR BLD AUTO: 0.6 % — SIGNIFICANT CHANGE UP (ref 0–2)
BASOPHILS NFR BLD AUTO: 0.7 %
BILIRUB SERPL-MCNC: 0.2 MG/DL
BILIRUB SERPL-MCNC: 0.3 MG/DL — SIGNIFICANT CHANGE UP (ref 0.2–1.2)
BILIRUB SERPL-MCNC: 0.8 MG/DL — SIGNIFICANT CHANGE UP (ref 0.2–1.2)
BILIRUB SERPL-MCNC: 0.8 MG/DL — SIGNIFICANT CHANGE UP (ref 0.2–1.2)
BILIRUB UR-MCNC: NEGATIVE — SIGNIFICANT CHANGE UP
BUN SERPL-MCNC: 12 MG/DL — SIGNIFICANT CHANGE UP (ref 7–23)
BUN SERPL-MCNC: 14 MG/DL
BUN SERPL-MCNC: 15 MG/DL — SIGNIFICANT CHANGE UP (ref 7–23)
BUN SERPL-MCNC: 18 MG/DL — SIGNIFICANT CHANGE UP (ref 7–23)
BUN SERPL-MCNC: 21 MG/DL — SIGNIFICANT CHANGE UP (ref 7–23)
BUN SERPL-MCNC: 21 MG/DL — SIGNIFICANT CHANGE UP (ref 7–23)
BUN SERPL-MCNC: 32 MG/DL — HIGH (ref 7–23)
BUN SERPL-MCNC: 33 MG/DL — HIGH (ref 7–23)
BUN SERPL-MCNC: 34 MG/DL — HIGH (ref 7–23)
CALCIUM SERPL-MCNC: 8 MG/DL — LOW (ref 8.4–10.5)
CALCIUM SERPL-MCNC: 8.2 MG/DL — LOW (ref 8.5–10.1)
CALCIUM SERPL-MCNC: 8.3 MG/DL — LOW (ref 8.5–10.1)
CALCIUM SERPL-MCNC: 8.4 MG/DL — LOW (ref 8.5–10.1)
CALCIUM SERPL-MCNC: 8.5 MG/DL — SIGNIFICANT CHANGE UP (ref 8.4–10.5)
CALCIUM SERPL-MCNC: 8.9 MG/DL — SIGNIFICANT CHANGE UP (ref 8.4–10.5)
CALCIUM SERPL-MCNC: 9 MG/DL — SIGNIFICANT CHANGE UP (ref 8.4–10.5)
CALCIUM SERPL-MCNC: 9.2 MG/DL
CALCIUM SERPL-MCNC: 9.3 MG/DL — SIGNIFICANT CHANGE UP (ref 8.4–10.5)
CHLORIDE SERPL-SCNC: 100 MMOL/L — SIGNIFICANT CHANGE UP (ref 96–108)
CHLORIDE SERPL-SCNC: 100 MMOL/L — SIGNIFICANT CHANGE UP (ref 96–108)
CHLORIDE SERPL-SCNC: 101 MMOL/L
CHLORIDE SERPL-SCNC: 101 MMOL/L — SIGNIFICANT CHANGE UP (ref 96–108)
CHLORIDE SERPL-SCNC: 101 MMOL/L — SIGNIFICANT CHANGE UP (ref 96–108)
CHLORIDE SERPL-SCNC: 102 MMOL/L — SIGNIFICANT CHANGE UP (ref 96–108)
CHLORIDE SERPL-SCNC: 102 MMOL/L — SIGNIFICANT CHANGE UP (ref 96–108)
CHLORIDE SERPL-SCNC: 103 MMOL/L — SIGNIFICANT CHANGE UP (ref 96–108)
CHLORIDE SERPL-SCNC: 103 MMOL/L — SIGNIFICANT CHANGE UP (ref 96–108)
CO2 SERPL-SCNC: 22 MMOL/L — SIGNIFICANT CHANGE UP (ref 22–31)
CO2 SERPL-SCNC: 25 MMOL/L — SIGNIFICANT CHANGE UP (ref 22–31)
CO2 SERPL-SCNC: 25 MMOL/L — SIGNIFICANT CHANGE UP (ref 22–31)
CO2 SERPL-SCNC: 26 MMOL/L
CO2 SERPL-SCNC: 26 MMOL/L — SIGNIFICANT CHANGE UP (ref 22–31)
CO2 SERPL-SCNC: 26 MMOL/L — SIGNIFICANT CHANGE UP (ref 22–31)
CO2 SERPL-SCNC: 27 MMOL/L — SIGNIFICANT CHANGE UP (ref 22–31)
CO2 SERPL-SCNC: 27 MMOL/L — SIGNIFICANT CHANGE UP (ref 22–31)
CO2 SERPL-SCNC: 29 MMOL/L — SIGNIFICANT CHANGE UP (ref 22–31)
COLOR SPEC: YELLOW — SIGNIFICANT CHANGE UP
CREAT SERPL-MCNC: 0.83 MG/DL — SIGNIFICANT CHANGE UP (ref 0.5–1.3)
CREAT SERPL-MCNC: 0.84 MG/DL — SIGNIFICANT CHANGE UP (ref 0.5–1.3)
CREAT SERPL-MCNC: 0.85 MG/DL — SIGNIFICANT CHANGE UP (ref 0.5–1.3)
CREAT SERPL-MCNC: 0.87 MG/DL
CREAT SERPL-MCNC: 0.89 MG/DL — SIGNIFICANT CHANGE UP (ref 0.5–1.3)
CREAT SERPL-MCNC: 0.92 MG/DL — SIGNIFICANT CHANGE UP (ref 0.5–1.3)
CREAT SERPL-MCNC: 0.95 MG/DL — SIGNIFICANT CHANGE UP (ref 0.5–1.3)
CRP SERPL-MCNC: 4.88 MG/DL — HIGH (ref 0–0.4)
CULTURE RESULTS: NO GROWTH — SIGNIFICANT CHANGE UP
CULTURE RESULTS: SIGNIFICANT CHANGE UP
CULTURE RESULTS: SIGNIFICANT CHANGE UP
D DIMER BLD IA.RAPID-MCNC: 946 NG/ML DDU — HIGH
DIFF PNL FLD: NEGATIVE — SIGNIFICANT CHANGE UP
ELLIPTOCYTES BLD QL SMEAR: SLIGHT — SIGNIFICANT CHANGE UP
EOSINOPHIL # BLD AUTO: 0 K/UL — SIGNIFICANT CHANGE UP (ref 0–0.5)
EOSINOPHIL # BLD AUTO: 0 K/UL — SIGNIFICANT CHANGE UP (ref 0–0.5)
EOSINOPHIL # BLD AUTO: 0.01 K/UL — SIGNIFICANT CHANGE UP (ref 0–0.5)
EOSINOPHIL # BLD AUTO: 0.03 K/UL — SIGNIFICANT CHANGE UP (ref 0–0.5)
EOSINOPHIL # BLD AUTO: 0.1 K/UL — SIGNIFICANT CHANGE UP (ref 0–0.5)
EOSINOPHIL # BLD AUTO: 0.15 K/UL — SIGNIFICANT CHANGE UP (ref 0–0.5)
EOSINOPHIL # BLD AUTO: 0.2 K/UL — SIGNIFICANT CHANGE UP (ref 0–0.5)
EOSINOPHIL # BLD AUTO: 0.23 K/UL
EOSINOPHIL # BLD AUTO: 0.26 K/UL — SIGNIFICANT CHANGE UP (ref 0–0.5)
EOSINOPHIL NFR BLD AUTO: 0 % — SIGNIFICANT CHANGE UP (ref 0–6)
EOSINOPHIL NFR BLD AUTO: 0 % — SIGNIFICANT CHANGE UP (ref 0–6)
EOSINOPHIL NFR BLD AUTO: 0.1 % — SIGNIFICANT CHANGE UP (ref 0–6)
EOSINOPHIL NFR BLD AUTO: 0.2 % — SIGNIFICANT CHANGE UP (ref 0–6)
EOSINOPHIL NFR BLD AUTO: 1.6 % — SIGNIFICANT CHANGE UP (ref 0–6)
EOSINOPHIL NFR BLD AUTO: 2 % — SIGNIFICANT CHANGE UP (ref 0–6)
EOSINOPHIL NFR BLD AUTO: 2.1 % — SIGNIFICANT CHANGE UP (ref 0–6)
EOSINOPHIL NFR BLD AUTO: 2.4 %
EOSINOPHIL NFR BLD AUTO: 2.4 % — SIGNIFICANT CHANGE UP (ref 0–6)
FERRITIN SERPL-MCNC: 210 NG/ML — SIGNIFICANT CHANGE UP (ref 30–400)
FERRITIN SERPL-MCNC: 391 NG/ML
FERRITIN SERPL-MCNC: 923 NG/ML — HIGH (ref 30–400)
FOLATE SERPL-MCNC: 13.1 NG/ML — SIGNIFICANT CHANGE UP
FOLATE SERPL-MCNC: 15 NG/ML
GLUCOSE SERPL-MCNC: 111 MG/DL — HIGH (ref 70–99)
GLUCOSE SERPL-MCNC: 118 MG/DL — HIGH (ref 70–99)
GLUCOSE SERPL-MCNC: 128 MG/DL — HIGH (ref 70–99)
GLUCOSE SERPL-MCNC: 136 MG/DL
GLUCOSE SERPL-MCNC: 150 MG/DL — HIGH (ref 70–99)
GLUCOSE SERPL-MCNC: 154 MG/DL — HIGH (ref 70–99)
GLUCOSE SERPL-MCNC: 93 MG/DL — SIGNIFICANT CHANGE UP (ref 70–99)
GLUCOSE SERPL-MCNC: 95 MG/DL — SIGNIFICANT CHANGE UP (ref 70–99)
GLUCOSE SERPL-MCNC: 96 MG/DL — SIGNIFICANT CHANGE UP (ref 70–99)
GLUCOSE UR QL: NEGATIVE MG/DL — SIGNIFICANT CHANGE UP
HCT VFR BLD CALC: 27.2 % — LOW (ref 39–50)
HCT VFR BLD CALC: 28.4 % — LOW (ref 39–50)
HCT VFR BLD CALC: 30.7 % — LOW (ref 39–50)
HCT VFR BLD CALC: 32.1 % — LOW (ref 39–50)
HCT VFR BLD CALC: 33.5 % — LOW (ref 39–50)
HCT VFR BLD CALC: 37.1 % — LOW (ref 39–50)
HCT VFR BLD CALC: 38.8 %
HCT VFR BLD CALC: 38.9 % — LOW (ref 39–50)
HCT VFR BLD CALC: 42.4 % — SIGNIFICANT CHANGE UP (ref 39–50)
HCV AB S/CO SERPL IA: 14.05 S/CO — HIGH (ref 0–0.99)
HCV AB SERPL-IMP: REACTIVE
HCV RNA FLD QL NAA+PROBE: SIGNIFICANT CHANGE UP
HGB BLD-MCNC: 10.6 G/DL — LOW (ref 13–17)
HGB BLD-MCNC: 11.3 G/DL — LOW (ref 13–17)
HGB BLD-MCNC: 12.4 G/DL
HGB BLD-MCNC: 12.5 G/DL — LOW (ref 13–17)
HGB BLD-MCNC: 12.8 G/DL — LOW (ref 13–17)
HGB BLD-MCNC: 14.1 G/DL — SIGNIFICANT CHANGE UP (ref 13–17)
HGB BLD-MCNC: 9.4 G/DL — LOW (ref 13–17)
HGB BLD-MCNC: 9.6 G/DL — LOW (ref 13–17)
HGB BLD-MCNC: 9.9 G/DL — LOW (ref 13–17)
IMM GRANULOCYTES NFR BLD AUTO: 0.3 %
IMM GRANULOCYTES NFR BLD AUTO: 0.4 % — SIGNIFICANT CHANGE UP (ref 0–1.5)
IMM GRANULOCYTES NFR BLD AUTO: 0.6 % — SIGNIFICANT CHANGE UP (ref 0–1.5)
IMM GRANULOCYTES NFR BLD AUTO: 0.8 % — SIGNIFICANT CHANGE UP (ref 0–1.5)
IMM GRANULOCYTES NFR BLD AUTO: 0.8 % — SIGNIFICANT CHANGE UP (ref 0–1.5)
IMM GRANULOCYTES NFR BLD AUTO: 0.9 % — SIGNIFICANT CHANGE UP (ref 0–1.5)
IMM GRANULOCYTES NFR BLD AUTO: 1 % — SIGNIFICANT CHANGE UP (ref 0–1.5)
IMM GRANULOCYTES NFR BLD AUTO: 1.2 % — SIGNIFICANT CHANGE UP (ref 0–1.5)
INR BLD: 1.23 RATIO — HIGH (ref 0.88–1.16)
IRON SATN MFR SERPL: 140 UG/DL — SIGNIFICANT CHANGE UP (ref 45–165)
IRON SATN MFR SERPL: 29 %
IRON SATN MFR SERPL: 45 % — SIGNIFICANT CHANGE UP (ref 16–55)
IRON SERPL-MCNC: 77 UG/DL
KETONES UR-MCNC: NEGATIVE — SIGNIFICANT CHANGE UP
LACTATE SERPL-SCNC: 1.2 MMOL/L — SIGNIFICANT CHANGE UP (ref 0.7–2)
LDH SERPL L TO P-CCNC: 132 U/L — SIGNIFICANT CHANGE UP (ref 84–241)
LEUKOCYTE ESTERASE UR-ACNC: NEGATIVE — SIGNIFICANT CHANGE UP
LG PLATELETS BLD QL AUTO: SLIGHT — SIGNIFICANT CHANGE UP
LYMPHOCYTES # BLD AUTO: 0.37 K/UL — LOW (ref 1–3.3)
LYMPHOCYTES # BLD AUTO: 0.53 K/UL — LOW (ref 1–3.3)
LYMPHOCYTES # BLD AUTO: 0.83 K/UL — LOW (ref 1–3.3)
LYMPHOCYTES # BLD AUTO: 1.23 K/UL — SIGNIFICANT CHANGE UP (ref 1–3.3)
LYMPHOCYTES # BLD AUTO: 1.26 K/UL — SIGNIFICANT CHANGE UP (ref 1–3.3)
LYMPHOCYTES # BLD AUTO: 1.62 K/UL — SIGNIFICANT CHANGE UP (ref 1–3.3)
LYMPHOCYTES # BLD AUTO: 1.71 K/UL
LYMPHOCYTES # BLD AUTO: 1.71 K/UL — SIGNIFICANT CHANGE UP (ref 1–3.3)
LYMPHOCYTES # BLD AUTO: 13.9 % — SIGNIFICANT CHANGE UP (ref 13–44)
LYMPHOCYTES # BLD AUTO: 17.1 % — SIGNIFICANT CHANGE UP (ref 13–44)
LYMPHOCYTES # BLD AUTO: 2.64 K/UL — SIGNIFICANT CHANGE UP (ref 1–3.3)
LYMPHOCYTES # BLD AUTO: 24.5 % — SIGNIFICANT CHANGE UP (ref 13–44)
LYMPHOCYTES # BLD AUTO: 25.8 % — SIGNIFICANT CHANGE UP (ref 13–44)
LYMPHOCYTES # BLD AUTO: 3.5 % — LOW (ref 13–44)
LYMPHOCYTES # BLD AUTO: 3.8 % — LOW (ref 13–44)
LYMPHOCYTES # BLD AUTO: 37 % — SIGNIFICANT CHANGE UP (ref 13–44)
LYMPHOCYTES # BLD AUTO: 4.7 % — LOW (ref 13–44)
LYMPHOCYTES NFR BLD AUTO: 17.8 %
MAGNESIUM SERPL-MCNC: 1.1 MG/DL — LOW (ref 1.6–2.6)
MAGNESIUM SERPL-MCNC: 1.4 MG/DL — LOW (ref 1.6–2.6)
MAGNESIUM SERPL-MCNC: 1.7 MG/DL — SIGNIFICANT CHANGE UP (ref 1.6–2.6)
MAGNESIUM SERPL-MCNC: 1.8 MG/DL — SIGNIFICANT CHANGE UP (ref 1.6–2.6)
MAGNESIUM SERPL-MCNC: 1.9 MG/DL — SIGNIFICANT CHANGE UP (ref 1.6–2.6)
MAGNESIUM SERPL-MCNC: 2 MG/DL
MAGNESIUM SERPL-MCNC: 2 MG/DL — SIGNIFICANT CHANGE UP (ref 1.6–2.6)
MAGNESIUM SERPL-MCNC: 2.1 MG/DL — SIGNIFICANT CHANGE UP (ref 1.6–2.6)
MAN DIFF?: NORMAL
MCHC RBC-ENTMCNC: 30.8 PG — SIGNIFICANT CHANGE UP (ref 27–34)
MCHC RBC-ENTMCNC: 30.9 PG — SIGNIFICANT CHANGE UP (ref 27–34)
MCHC RBC-ENTMCNC: 31.1 PG — SIGNIFICANT CHANGE UP (ref 27–34)
MCHC RBC-ENTMCNC: 31.8 PG — SIGNIFICANT CHANGE UP (ref 27–34)
MCHC RBC-ENTMCNC: 32 GM/DL
MCHC RBC-ENTMCNC: 32.1 PG — SIGNIFICANT CHANGE UP (ref 27–34)
MCHC RBC-ENTMCNC: 32.2 GM/DL — SIGNIFICANT CHANGE UP (ref 32–36)
MCHC RBC-ENTMCNC: 32.4 PG — SIGNIFICANT CHANGE UP (ref 27–34)
MCHC RBC-ENTMCNC: 32.7 PG — SIGNIFICANT CHANGE UP (ref 27–34)
MCHC RBC-ENTMCNC: 32.9 GM/DL — SIGNIFICANT CHANGE UP (ref 32–36)
MCHC RBC-ENTMCNC: 32.9 PG — SIGNIFICANT CHANGE UP (ref 27–34)
MCHC RBC-ENTMCNC: 33 GM/DL — SIGNIFICANT CHANGE UP (ref 32–36)
MCHC RBC-ENTMCNC: 33.3 GM/DL — SIGNIFICANT CHANGE UP (ref 32–36)
MCHC RBC-ENTMCNC: 33.7 GM/DL — SIGNIFICANT CHANGE UP (ref 32–36)
MCHC RBC-ENTMCNC: 33.7 GM/DL — SIGNIFICANT CHANGE UP (ref 32–36)
MCHC RBC-ENTMCNC: 33.8 GM/DL — SIGNIFICANT CHANGE UP (ref 32–36)
MCHC RBC-ENTMCNC: 34.1 PG
MCHC RBC-ENTMCNC: 34.6 GM/DL — SIGNIFICANT CHANGE UP (ref 32–36)
MCV RBC AUTO: 106.6 FL
MCV RBC AUTO: 91.8 FL — SIGNIFICANT CHANGE UP (ref 80–100)
MCV RBC AUTO: 92.6 FL — SIGNIFICANT CHANGE UP (ref 80–100)
MCV RBC AUTO: 93.8 FL — SIGNIFICANT CHANGE UP (ref 80–100)
MCV RBC AUTO: 94.4 FL — SIGNIFICANT CHANGE UP (ref 80–100)
MCV RBC AUTO: 94.6 FL — SIGNIFICANT CHANGE UP (ref 80–100)
MCV RBC AUTO: 97.3 FL — SIGNIFICANT CHANGE UP (ref 80–100)
MCV RBC AUTO: 99.1 FL — SIGNIFICANT CHANGE UP (ref 80–100)
MCV RBC AUTO: 99.7 FL — SIGNIFICANT CHANGE UP (ref 80–100)
MONOCYTES # BLD AUTO: 0.13 K/UL — SIGNIFICANT CHANGE UP (ref 0–0.9)
MONOCYTES # BLD AUTO: 0.48 K/UL — SIGNIFICANT CHANGE UP (ref 0–0.9)
MONOCYTES # BLD AUTO: 0.66 K/UL — SIGNIFICANT CHANGE UP (ref 0–0.9)
MONOCYTES # BLD AUTO: 0.74 K/UL — SIGNIFICANT CHANGE UP (ref 0–0.9)
MONOCYTES # BLD AUTO: 0.82 K/UL — SIGNIFICANT CHANGE UP (ref 0–0.9)
MONOCYTES # BLD AUTO: 0.87 K/UL — SIGNIFICANT CHANGE UP (ref 0–0.9)
MONOCYTES # BLD AUTO: 1.06 K/UL — HIGH (ref 0–0.9)
MONOCYTES # BLD AUTO: 1.08 K/UL
MONOCYTES # BLD AUTO: 1.57 K/UL — HIGH (ref 0–0.9)
MONOCYTES NFR BLD AUTO: 1.3 % — LOW (ref 2–14)
MONOCYTES NFR BLD AUTO: 11.2 %
MONOCYTES NFR BLD AUTO: 11.4 % — SIGNIFICANT CHANGE UP (ref 2–14)
MONOCYTES NFR BLD AUTO: 12.8 % — SIGNIFICANT CHANGE UP (ref 2–14)
MONOCYTES NFR BLD AUTO: 15 % — HIGH (ref 2–14)
MONOCYTES NFR BLD AUTO: 15.1 % — HIGH (ref 2–14)
MONOCYTES NFR BLD AUTO: 3.2 % — SIGNIFICANT CHANGE UP (ref 2–14)
MONOCYTES NFR BLD AUTO: 6 % — SIGNIFICANT CHANGE UP (ref 2–14)
MONOCYTES NFR BLD AUTO: 8.1 % — SIGNIFICANT CHANGE UP (ref 2–14)
NEUTROPHILS # BLD AUTO: 14.05 K/UL — HIGH (ref 1.8–7.4)
NEUTROPHILS # BLD AUTO: 15.46 K/UL — HIGH (ref 1.8–7.4)
NEUTROPHILS # BLD AUTO: 2.1 K/UL — SIGNIFICANT CHANGE UP (ref 1.8–7.4)
NEUTROPHILS # BLD AUTO: 2.73 K/UL — SIGNIFICANT CHANGE UP (ref 1.8–7.4)
NEUTROPHILS # BLD AUTO: 4.95 K/UL — SIGNIFICANT CHANGE UP (ref 1.8–7.4)
NEUTROPHILS # BLD AUTO: 6.5 K/UL
NEUTROPHILS # BLD AUTO: 6.89 K/UL — SIGNIFICANT CHANGE UP (ref 1.8–7.4)
NEUTROPHILS # BLD AUTO: 8.67 K/UL — HIGH (ref 1.8–7.4)
NEUTROPHILS # BLD AUTO: 9.02 K/UL — HIGH (ref 1.8–7.4)
NEUTROPHILS NFR BLD AUTO: 48 % — SIGNIFICANT CHANGE UP (ref 43–77)
NEUTROPHILS NFR BLD AUTO: 55.9 % — SIGNIFICANT CHANGE UP (ref 43–77)
NEUTROPHILS NFR BLD AUTO: 63.8 % — SIGNIFICANT CHANGE UP (ref 43–77)
NEUTROPHILS NFR BLD AUTO: 67.6 %
NEUTROPHILS NFR BLD AUTO: 68.7 % — SIGNIFICANT CHANGE UP (ref 43–77)
NEUTROPHILS NFR BLD AUTO: 70.6 % — SIGNIFICANT CHANGE UP (ref 43–77)
NEUTROPHILS NFR BLD AUTO: 87.7 % — HIGH (ref 43–77)
NEUTROPHILS NFR BLD AUTO: 92.2 % — HIGH (ref 43–77)
NEUTROPHILS NFR BLD AUTO: 93.8 % — HIGH (ref 43–77)
NITRITE UR-MCNC: NEGATIVE — SIGNIFICANT CHANGE UP
NRBC # BLD: 0 /100 WBCS — SIGNIFICANT CHANGE UP (ref 0–0)
NRBC # BLD: 0 — SIGNIFICANT CHANGE UP
NRBC # BLD: SIGNIFICANT CHANGE UP /100 WBCS (ref 0–0)
OVALOCYTES BLD QL SMEAR: SLIGHT — SIGNIFICANT CHANGE UP
PH UR: 5 — SIGNIFICANT CHANGE UP (ref 5–8)
PHOSPHATE SERPL-MCNC: 3.5 MG/DL — SIGNIFICANT CHANGE UP (ref 2.5–4.5)
PLAT MORPH BLD: NORMAL — SIGNIFICANT CHANGE UP
PLAT MORPH BLD: NORMAL — SIGNIFICANT CHANGE UP
PLATELET # BLD AUTO: 102 K/UL — LOW (ref 150–400)
PLATELET # BLD AUTO: 123 K/UL — LOW (ref 150–400)
PLATELET # BLD AUTO: 142 K/UL — LOW (ref 150–400)
PLATELET # BLD AUTO: 143 K/UL — LOW (ref 150–400)
PLATELET # BLD AUTO: 143 K/UL — LOW (ref 150–400)
PLATELET # BLD AUTO: 186 K/UL — SIGNIFICANT CHANGE UP (ref 150–400)
PLATELET # BLD AUTO: 213 K/UL — SIGNIFICANT CHANGE UP (ref 150–400)
PLATELET # BLD AUTO: 224 K/UL
PLATELET # BLD AUTO: 246 K/UL — SIGNIFICANT CHANGE UP (ref 150–400)
POLYCHROMASIA BLD QL SMEAR: SLIGHT — SIGNIFICANT CHANGE UP
POTASSIUM SERPL-MCNC: 4.1 MMOL/L — SIGNIFICANT CHANGE UP (ref 3.5–5.3)
POTASSIUM SERPL-MCNC: 4.5 MMOL/L — SIGNIFICANT CHANGE UP (ref 3.5–5.3)
POTASSIUM SERPL-MCNC: 4.5 MMOL/L — SIGNIFICANT CHANGE UP (ref 3.5–5.3)
POTASSIUM SERPL-MCNC: 4.6 MMOL/L — SIGNIFICANT CHANGE UP (ref 3.5–5.3)
POTASSIUM SERPL-MCNC: 4.6 MMOL/L — SIGNIFICANT CHANGE UP (ref 3.5–5.3)
POTASSIUM SERPL-MCNC: 4.7 MMOL/L — SIGNIFICANT CHANGE UP (ref 3.5–5.3)
POTASSIUM SERPL-SCNC: 4.1 MMOL/L — SIGNIFICANT CHANGE UP (ref 3.5–5.3)
POTASSIUM SERPL-SCNC: 4.3 MMOL/L
POTASSIUM SERPL-SCNC: 4.5 MMOL/L — SIGNIFICANT CHANGE UP (ref 3.5–5.3)
POTASSIUM SERPL-SCNC: 4.5 MMOL/L — SIGNIFICANT CHANGE UP (ref 3.5–5.3)
POTASSIUM SERPL-SCNC: 4.6 MMOL/L — SIGNIFICANT CHANGE UP (ref 3.5–5.3)
POTASSIUM SERPL-SCNC: 4.6 MMOL/L — SIGNIFICANT CHANGE UP (ref 3.5–5.3)
POTASSIUM SERPL-SCNC: 4.7 MMOL/L — SIGNIFICANT CHANGE UP (ref 3.5–5.3)
PROCALCITONIN SERPL-MCNC: 0.13 NG/ML — HIGH (ref 0.02–0.1)
PROT SERPL-MCNC: 6.4 GM/DL — SIGNIFICANT CHANGE UP (ref 6–8.3)
PROT SERPL-MCNC: 6.6 GM/DL — SIGNIFICANT CHANGE UP (ref 6–8.3)
PROT SERPL-MCNC: 7 G/DL
PROT SERPL-MCNC: 7.2 G/DL — SIGNIFICANT CHANGE UP (ref 6–8.3)
PROT UR-MCNC: 15 MG/DL
PROTHROM AB SERPL-ACNC: 14.1 SEC — HIGH (ref 10.6–13.6)
RBC # BLD: 2.9 M/UL — LOW (ref 4.2–5.8)
RBC # BLD: 2.92 M/UL — LOW (ref 4.2–5.8)
RBC # BLD: 3.08 M/UL — LOW (ref 4.2–5.8)
RBC # BLD: 3.24 M/UL — LOW (ref 4.2–5.8)
RBC # BLD: 3.55 M/UL — LOW (ref 4.2–5.8)
RBC # BLD: 3.64 M/UL
RBC # BLD: 4.04 M/UL — LOW (ref 4.2–5.8)
RBC # BLD: 4.11 M/UL — LOW (ref 4.2–5.8)
RBC # BLD: 4.58 M/UL — SIGNIFICANT CHANGE UP (ref 4.2–5.8)
RBC # FLD: 12.1 % — SIGNIFICANT CHANGE UP (ref 10.3–14.5)
RBC # FLD: 12.2 % — SIGNIFICANT CHANGE UP (ref 10.3–14.5)
RBC # FLD: 12.3 % — SIGNIFICANT CHANGE UP (ref 10.3–14.5)
RBC # FLD: 12.3 % — SIGNIFICANT CHANGE UP (ref 10.3–14.5)
RBC # FLD: 12.4 %
RBC # FLD: 12.6 % — SIGNIFICANT CHANGE UP (ref 10.3–14.5)
RBC # FLD: 12.7 % — SIGNIFICANT CHANGE UP (ref 10.3–14.5)
RBC # FLD: 13.3 % — SIGNIFICANT CHANGE UP (ref 10.3–14.5)
RBC # FLD: 14.6 % — HIGH (ref 10.3–14.5)
RBC BLD AUTO: SIGNIFICANT CHANGE UP
RBC BLD AUTO: SIGNIFICANT CHANGE UP
SARS-COV-2 IGG SERPL QL IA: POSITIVE
SARS-COV-2 IGM SERPL IA-ACNC: 3.25 INDEX — HIGH
SARS-COV-2 RNA SPEC QL NAA+PROBE: DETECTED
SODIUM SERPL-SCNC: 134 MMOL/L — LOW (ref 135–145)
SODIUM SERPL-SCNC: 135 MMOL/L — SIGNIFICANT CHANGE UP (ref 135–145)
SODIUM SERPL-SCNC: 136 MMOL/L — SIGNIFICANT CHANGE UP (ref 135–145)
SODIUM SERPL-SCNC: 136 MMOL/L — SIGNIFICANT CHANGE UP (ref 135–145)
SODIUM SERPL-SCNC: 137 MMOL/L — SIGNIFICANT CHANGE UP (ref 135–145)
SODIUM SERPL-SCNC: 139 MMOL/L — SIGNIFICANT CHANGE UP (ref 135–145)
SODIUM SERPL-SCNC: 139 MMOL/L — SIGNIFICANT CHANGE UP (ref 135–145)
SODIUM SERPL-SCNC: 140 MMOL/L
SODIUM SERPL-SCNC: 141 MMOL/L — SIGNIFICANT CHANGE UP (ref 135–145)
SP GR SPEC: 1.02 — SIGNIFICANT CHANGE UP (ref 1.01–1.02)
SPECIMEN SOURCE: SIGNIFICANT CHANGE UP
TIBC SERPL-MCNC: 269 UG/DL
TIBC SERPL-MCNC: 312 UG/DL — SIGNIFICANT CHANGE UP (ref 220–430)
TROPONIN I SERPL-MCNC: 0.05 NG/ML — HIGH (ref 0.01–0.04)
TROPONIN I SERPL-MCNC: 0.05 NG/ML — HIGH (ref 0.01–0.04)
UIBC SERPL-MCNC: 172 UG/DL — SIGNIFICANT CHANGE UP (ref 110–370)
UIBC SERPL-MCNC: 192 UG/DL
UROBILINOGEN FLD QL: NEGATIVE MG/DL — SIGNIFICANT CHANGE UP
VIT B12 SERPL-MCNC: 392 PG/ML — SIGNIFICANT CHANGE UP (ref 232–1245)
VIT B12 SERPL-MCNC: 478 PG/ML
WBC # BLD: 10.79 K/UL — HIGH (ref 3.8–10.5)
WBC # BLD: 12.29 K/UL — HIGH (ref 3.8–10.5)
WBC # BLD: 15.23 K/UL — HIGH (ref 3.8–10.5)
WBC # BLD: 17.63 K/UL — HIGH (ref 3.8–10.5)
WBC # BLD: 4.38 K/UL — SIGNIFICANT CHANGE UP (ref 3.8–10.5)
WBC # BLD: 4.89 K/UL — SIGNIFICANT CHANGE UP (ref 3.8–10.5)
WBC # BLD: 7.2 K/UL — SIGNIFICANT CHANGE UP (ref 3.8–10.5)
WBC # BLD: 9.63 K/UL — SIGNIFICANT CHANGE UP (ref 3.8–10.5)
WBC # FLD AUTO: 10.79 K/UL — HIGH (ref 3.8–10.5)
WBC # FLD AUTO: 12.29 K/UL — HIGH (ref 3.8–10.5)
WBC # FLD AUTO: 15.23 K/UL — HIGH (ref 3.8–10.5)
WBC # FLD AUTO: 17.63 K/UL — HIGH (ref 3.8–10.5)
WBC # FLD AUTO: 4.38 K/UL — SIGNIFICANT CHANGE UP (ref 3.8–10.5)
WBC # FLD AUTO: 4.89 K/UL — SIGNIFICANT CHANGE UP (ref 3.8–10.5)
WBC # FLD AUTO: 7.2 K/UL — SIGNIFICANT CHANGE UP (ref 3.8–10.5)
WBC # FLD AUTO: 9.62 K/UL
WBC # FLD AUTO: 9.63 K/UL — SIGNIFICANT CHANGE UP (ref 3.8–10.5)

## 2020-01-01 PROCEDURE — 85025 COMPLETE CBC W/AUTO DIFF WBC: CPT

## 2020-01-01 PROCEDURE — 87521 HEPATITIS C PROBE&RVRS TRNSC: CPT

## 2020-01-01 PROCEDURE — 99205 OFFICE O/P NEW HI 60 MIN: CPT | Mod: 95

## 2020-01-01 PROCEDURE — 71045 X-RAY EXAM CHEST 1 VIEW: CPT | Mod: 26

## 2020-01-01 PROCEDURE — 86803 HEPATITIS C AB TEST: CPT

## 2020-01-01 PROCEDURE — 99499A: CUSTOM | Mod: NC

## 2020-01-01 PROCEDURE — 83615 LACTATE (LD) (LDH) ENZYME: CPT

## 2020-01-01 PROCEDURE — 78815 PET IMAGE W/CT SKULL-THIGH: CPT

## 2020-01-01 PROCEDURE — 99239 HOSP IP/OBS DSCHRG MGMT >30: CPT

## 2020-01-01 PROCEDURE — 86769 SARS-COV-2 COVID-19 ANTIBODY: CPT

## 2020-01-01 PROCEDURE — 99232 SBSQ HOSP IP/OBS MODERATE 35: CPT

## 2020-01-01 PROCEDURE — 99215 OFFICE O/P EST HI 40 MIN: CPT | Mod: 25

## 2020-01-01 PROCEDURE — 31575 DIAGNOSTIC LARYNGOSCOPY: CPT

## 2020-01-01 PROCEDURE — 99214 OFFICE O/P EST MOD 30 MIN: CPT

## 2020-01-01 PROCEDURE — 83735 ASSAY OF MAGNESIUM: CPT

## 2020-01-01 PROCEDURE — A9552: CPT

## 2020-01-01 PROCEDURE — 99215 OFFICE O/P EST HI 40 MIN: CPT

## 2020-01-01 PROCEDURE — 71260 CT THORAX DX C+: CPT

## 2020-01-01 PROCEDURE — 99223 1ST HOSP IP/OBS HIGH 75: CPT | Mod: AI

## 2020-01-01 PROCEDURE — 99214 OFFICE O/P EST MOD 30 MIN: CPT | Mod: 25

## 2020-01-01 PROCEDURE — 80053 COMPREHEN METABOLIC PANEL: CPT

## 2020-01-01 PROCEDURE — 85379 FIBRIN DEGRADATION QUANT: CPT

## 2020-01-01 PROCEDURE — 84100 ASSAY OF PHOSPHORUS: CPT

## 2020-01-01 PROCEDURE — 99233 SBSQ HOSP IP/OBS HIGH 50: CPT

## 2020-01-01 PROCEDURE — 94640 AIRWAY INHALATION TREATMENT: CPT

## 2020-01-01 PROCEDURE — 80048 BASIC METABOLIC PNL TOTAL CA: CPT

## 2020-01-01 PROCEDURE — 93010 ELECTROCARDIOGRAM REPORT: CPT

## 2020-01-01 PROCEDURE — 82728 ASSAY OF FERRITIN: CPT

## 2020-01-01 PROCEDURE — 78815 PET IMAGE W/CT SKULL-THIGH: CPT | Mod: 26,PS

## 2020-01-01 PROCEDURE — 99205 OFFICE O/P NEW HI 60 MIN: CPT

## 2020-01-01 PROCEDURE — 84484 ASSAY OF TROPONIN QUANT: CPT

## 2020-01-01 PROCEDURE — 84145 PROCALCITONIN (PCT): CPT

## 2020-01-01 PROCEDURE — 36415 COLL VENOUS BLD VENIPUNCTURE: CPT

## 2020-01-01 PROCEDURE — 99072 ADDL SUPL MATRL&STAF TM PHE: CPT

## 2020-01-01 PROCEDURE — 86140 C-REACTIVE PROTEIN: CPT

## 2020-01-01 PROCEDURE — 71260 CT THORAX DX C+: CPT | Mod: 26

## 2020-01-01 RX ORDER — POT SOR/HE-CELLULOS/POV/HYALUR
GEL IN PACKET (ML) MUCOUS MEMBRANE
Refills: 0 | Status: DISCONTINUED | COMMUNITY
End: 2020-01-01

## 2020-01-01 RX ORDER — CEFUROXIME AXETIL 250 MG
1 TABLET ORAL
Qty: 14 | Refills: 0
Start: 2020-01-01 | End: 2020-01-01

## 2020-01-01 RX ORDER — CEFUROXIME AXETIL 500 MG/1
500 TABLET ORAL
Qty: 14 | Refills: 0 | Status: ACTIVE | COMMUNITY
Start: 2020-01-01

## 2020-01-01 RX ORDER — ALBUTEROL SULFATE 90 UG/1
108 (90 BASE) INHALANT RESPIRATORY (INHALATION)
Refills: 0 | Status: ACTIVE | COMMUNITY
Start: 2020-01-01

## 2020-01-01 RX ORDER — IPRATROPIUM BROMIDE 0.2 MG/ML
1 SOLUTION, NON-ORAL INHALATION EVERY 6 HOURS
Refills: 0 | Status: DISCONTINUED | OUTPATIENT
Start: 2020-01-01 | End: 2020-01-01

## 2020-01-01 RX ORDER — RIVAROXABAN 15 MG-20MG
1 KIT ORAL
Qty: 30 | Refills: 0
Start: 2020-01-01 | End: 2021-01-01

## 2020-01-01 RX ORDER — CEFEPIME 1 G/1
2000 INJECTION, POWDER, FOR SOLUTION INTRAMUSCULAR; INTRAVENOUS EVERY 12 HOURS
Refills: 0 | Status: DISCONTINUED | OUTPATIENT
Start: 2020-01-01 | End: 2020-01-01

## 2020-01-01 RX ORDER — FLUTICASONE FUROATE AND VILANTEROL TRIFENATATE 100; 25 UG/1; UG/1
100-25 POWDER RESPIRATORY (INHALATION) DAILY
Refills: 0 | Status: DISCONTINUED | COMMUNITY
Start: 2020-01-01 | End: 2020-01-01

## 2020-01-01 RX ORDER — DEXAMETHASONE 0.5 MG/5ML
6 ELIXIR ORAL DAILY
Refills: 0 | Status: DISCONTINUED | OUTPATIENT
Start: 2020-01-01 | End: 2020-01-01

## 2020-01-01 RX ORDER — POT SOR/HE-CELLULOS/POV/HYALUR
GEL IN PACKET (ML) MUCOUS MEMBRANE
Qty: 450 | Refills: 0 | Status: COMPLETED | COMMUNITY
Start: 2020-01-01

## 2020-01-01 RX ORDER — ALBUTEROL 90 UG/1
2 AEROSOL, METERED ORAL EVERY 6 HOURS
Refills: 0 | Status: DISCONTINUED | OUTPATIENT
Start: 2020-01-01 | End: 2020-01-01

## 2020-01-01 RX ORDER — FLUTICASONE FUROATE AND VILANTEROL TRIFENATATE 100; 25 UG/1; UG/1
100-25 POWDER RESPIRATORY (INHALATION)
Qty: 60 | Refills: 0 | Status: COMPLETED | COMMUNITY
Start: 2020-01-01

## 2020-01-01 RX ORDER — IPRATROPIUM BROMIDE 17 UG/1
17 AEROSOL, METERED RESPIRATORY (INHALATION) EVERY 6 HOURS
Refills: 0 | Status: ACTIVE | COMMUNITY
Start: 2020-01-01

## 2020-01-01 RX ORDER — PROCHLORPERAZINE MALEATE 10 MG/1
10 TABLET ORAL EVERY 6 HOURS
Qty: 30 | Refills: 3 | Status: COMPLETED | COMMUNITY
Start: 2020-01-01 | End: 2020-01-01

## 2020-01-01 RX ORDER — IPRATROPIUM BROMIDE 0.2 MG/ML
1 SOLUTION, NON-ORAL INHALATION
Qty: 0 | Refills: 0 | DISCHARGE
Start: 2020-01-01

## 2020-01-01 RX ORDER — DEXAMETHASONE 6 MG/1
6 TABLET ORAL DAILY
Qty: 7 | Refills: 0 | Status: ACTIVE | COMMUNITY
Start: 2020-01-01

## 2020-01-01 RX ORDER — CEFEPIME 1 G/1
1000 INJECTION, POWDER, FOR SOLUTION INTRAMUSCULAR; INTRAVENOUS ONCE
Refills: 0 | Status: COMPLETED | OUTPATIENT
Start: 2020-01-01 | End: 2020-01-01

## 2020-01-01 RX ORDER — BUDESONIDE AND FORMOTEROL FUMARATE DIHYDRATE 80; 4.5 UG/1; UG/1
80-4.5 AEROSOL RESPIRATORY (INHALATION) DAILY
Refills: 0 | Status: ACTIVE | COMMUNITY
Start: 2020-01-01

## 2020-01-01 RX ORDER — ACETAMINOPHEN 500 MG
650 TABLET ORAL EVERY 6 HOURS
Refills: 0 | Status: DISCONTINUED | OUTPATIENT
Start: 2020-01-01 | End: 2020-01-01

## 2020-01-01 RX ORDER — AZITHROMYCIN 500 MG/1
500 TABLET, FILM COATED ORAL EVERY 24 HOURS
Refills: 0 | Status: DISCONTINUED | OUTPATIENT
Start: 2020-01-01 | End: 2020-01-01

## 2020-01-01 RX ORDER — DIPHENHYDRAMINE HYDROCHLORIDE AND LIDOCAINE HYDROCHLORIDE AND ALUMINUM HYDROXIDE AND MAGNESIUM HYDRO
KIT
Qty: 1 | Refills: 3 | Status: ACTIVE | COMMUNITY
Start: 2020-01-01 | End: 1900-01-01

## 2020-01-01 RX ORDER — ONDANSETRON 8 MG/1
4 TABLET, FILM COATED ORAL EVERY 6 HOURS
Refills: 0 | Status: DISCONTINUED | OUTPATIENT
Start: 2020-01-01 | End: 2020-01-01

## 2020-01-01 RX ORDER — IBUPROFEN 600 MG/1
600 TABLET, FILM COATED ORAL 3 TIMES DAILY
Qty: 21 | Refills: 1 | Status: DISCONTINUED | COMMUNITY
Start: 2020-01-01 | End: 2020-01-01

## 2020-01-01 RX ORDER — IBUPROFEN 400 MG/1
400 TABLET, FILM COATED ORAL 3 TIMES DAILY
Refills: 0 | Status: DISCONTINUED | COMMUNITY
Start: 2020-01-01 | End: 2020-01-01

## 2020-01-01 RX ORDER — CEFEPIME 1 G/1
1000 INJECTION, POWDER, FOR SOLUTION INTRAMUSCULAR; INTRAVENOUS ONCE
Refills: 0 | Status: DISCONTINUED | OUTPATIENT
Start: 2020-01-01 | End: 2020-01-01

## 2020-01-01 RX ORDER — BUDESONIDE AND FORMOTEROL FUMARATE DIHYDRATE 160; 4.5 UG/1; UG/1
2 AEROSOL RESPIRATORY (INHALATION)
Refills: 0 | Status: DISCONTINUED | OUTPATIENT
Start: 2020-01-01 | End: 2020-01-01

## 2020-01-01 RX ORDER — DEXAMETHASONE 0.5 MG/5ML
1 ELIXIR ORAL
Qty: 7 | Refills: 0
Start: 2020-01-01 | End: 2020-01-01

## 2020-01-01 RX ORDER — HYDROCODONE BITARTRATE AND ACETAMINOPHEN 5; 325 MG/1; MG/1
5-325 TABLET ORAL
Qty: 12 | Refills: 0 | Status: COMPLETED | COMMUNITY
Start: 2020-03-05

## 2020-01-01 RX ORDER — ALPRAZOLAM 0.5 MG/1
0.5 TABLET ORAL
Qty: 2 | Refills: 0 | Status: COMPLETED | COMMUNITY
Start: 2020-01-01

## 2020-01-01 RX ORDER — RIVAROXABAN 10 MG/1
10 TABLET, FILM COATED ORAL DAILY
Qty: 30 | Refills: 0 | Status: ACTIVE | COMMUNITY
Start: 2020-01-01

## 2020-01-01 RX ORDER — OMEPRAZOLE 40 MG/1
40 CAPSULE, DELAYED RELEASE ORAL
Qty: 30 | Refills: 6 | Status: DISCONTINUED | COMMUNITY
Start: 2020-01-01 | End: 2020-01-01

## 2020-01-01 RX ORDER — CEFEPIME 1 G/1
2000 INJECTION, POWDER, FOR SOLUTION INTRAMUSCULAR; INTRAVENOUS EVERY 8 HOURS
Refills: 0 | Status: DISCONTINUED | OUTPATIENT
Start: 2020-01-01 | End: 2020-01-01

## 2020-01-01 RX ORDER — AZITHROMYCIN 500 MG/1
500 TABLET, FILM COATED ORAL ONCE
Refills: 0 | Status: COMPLETED | OUTPATIENT
Start: 2020-01-01 | End: 2020-01-01

## 2020-01-01 RX ORDER — ENOXAPARIN SODIUM 100 MG/ML
40 INJECTION SUBCUTANEOUS DAILY
Refills: 0 | Status: DISCONTINUED | OUTPATIENT
Start: 2020-01-01 | End: 2020-01-01

## 2020-01-01 RX ADMIN — Medication 1 PUFF(S): at 08:23

## 2020-01-01 RX ADMIN — Medication 6 MILLIGRAM(S): at 05:05

## 2020-01-01 RX ADMIN — CEFEPIME 100 MILLIGRAM(S): 1 INJECTION, POWDER, FOR SOLUTION INTRAMUSCULAR; INTRAVENOUS at 22:06

## 2020-01-01 RX ADMIN — BUDESONIDE AND FORMOTEROL FUMARATE DIHYDRATE 2 PUFF(S): 160; 4.5 AEROSOL RESPIRATORY (INHALATION) at 08:34

## 2020-01-01 RX ADMIN — CEFEPIME 100 MILLIGRAM(S): 1 INJECTION, POWDER, FOR SOLUTION INTRAMUSCULAR; INTRAVENOUS at 21:18

## 2020-01-01 RX ADMIN — BUDESONIDE AND FORMOTEROL FUMARATE DIHYDRATE 2 PUFF(S): 160; 4.5 AEROSOL RESPIRATORY (INHALATION) at 09:04

## 2020-01-01 RX ADMIN — BUDESONIDE AND FORMOTEROL FUMARATE DIHYDRATE 2 PUFF(S): 160; 4.5 AEROSOL RESPIRATORY (INHALATION) at 08:11

## 2020-01-01 RX ADMIN — Medication 1 PUFF(S): at 13:40

## 2020-01-01 RX ADMIN — ENOXAPARIN SODIUM 40 MILLIGRAM(S): 100 INJECTION SUBCUTANEOUS at 11:15

## 2020-01-01 RX ADMIN — Medication 1 PUFF(S): at 09:05

## 2020-01-01 RX ADMIN — CEFEPIME 100 MILLIGRAM(S): 1 INJECTION, POWDER, FOR SOLUTION INTRAMUSCULAR; INTRAVENOUS at 09:33

## 2020-01-01 RX ADMIN — BUDESONIDE AND FORMOTEROL FUMARATE DIHYDRATE 2 PUFF(S): 160; 4.5 AEROSOL RESPIRATORY (INHALATION) at 20:48

## 2020-01-01 RX ADMIN — Medication 1 PUFF(S): at 20:39

## 2020-01-01 RX ADMIN — ALBUTEROL 2 PUFF(S): 90 AEROSOL, METERED ORAL at 08:23

## 2020-01-01 RX ADMIN — AZITHROMYCIN 255 MILLIGRAM(S): 500 TABLET, FILM COATED ORAL at 22:06

## 2020-01-01 RX ADMIN — Medication 6 MILLIGRAM(S): at 05:37

## 2020-01-01 RX ADMIN — CEFEPIME 100 MILLIGRAM(S): 1 INJECTION, POWDER, FOR SOLUTION INTRAMUSCULAR; INTRAVENOUS at 10:18

## 2020-01-01 RX ADMIN — Medication 1 PUFF(S): at 20:15

## 2020-01-01 RX ADMIN — BUDESONIDE AND FORMOTEROL FUMARATE DIHYDRATE 2 PUFF(S): 160; 4.5 AEROSOL RESPIRATORY (INHALATION) at 20:39

## 2020-01-01 RX ADMIN — BUDESONIDE AND FORMOTEROL FUMARATE DIHYDRATE 2 PUFF(S): 160; 4.5 AEROSOL RESPIRATORY (INHALATION) at 20:15

## 2020-01-01 RX ADMIN — BUDESONIDE AND FORMOTEROL FUMARATE DIHYDRATE 2 PUFF(S): 160; 4.5 AEROSOL RESPIRATORY (INHALATION) at 08:23

## 2020-01-01 RX ADMIN — ENOXAPARIN SODIUM 40 MILLIGRAM(S): 100 INJECTION SUBCUTANEOUS at 09:33

## 2020-01-01 RX ADMIN — Medication 1 PUFF(S): at 01:14

## 2020-01-01 RX ADMIN — Medication 1 PUFF(S): at 14:05

## 2020-01-01 RX ADMIN — CEFEPIME 100 MILLIGRAM(S): 1 INJECTION, POWDER, FOR SOLUTION INTRAMUSCULAR; INTRAVENOUS at 21:25

## 2020-01-01 RX ADMIN — Medication 1 PUFF(S): at 14:48

## 2020-01-01 RX ADMIN — CEFEPIME 1000 MILLIGRAM(S): 1 INJECTION, POWDER, FOR SOLUTION INTRAMUSCULAR; INTRAVENOUS at 19:48

## 2020-01-01 RX ADMIN — Medication 1 PUFF(S): at 08:34

## 2020-01-01 RX ADMIN — AZITHROMYCIN 255 MILLIGRAM(S): 500 TABLET, FILM COATED ORAL at 20:12

## 2020-01-01 RX ADMIN — AZITHROMYCIN 255 MILLIGRAM(S): 500 TABLET, FILM COATED ORAL at 21:24

## 2020-01-01 RX ADMIN — CEFEPIME 100 MILLIGRAM(S): 1 INJECTION, POWDER, FOR SOLUTION INTRAMUSCULAR; INTRAVENOUS at 09:16

## 2020-01-01 RX ADMIN — CEFEPIME 100 MILLIGRAM(S): 1 INJECTION, POWDER, FOR SOLUTION INTRAMUSCULAR; INTRAVENOUS at 21:01

## 2020-01-01 RX ADMIN — Medication 6 MILLIGRAM(S): at 01:17

## 2020-01-01 RX ADMIN — Medication 1 PUFF(S): at 02:09

## 2020-01-01 RX ADMIN — Medication 1 PUFF(S): at 13:45

## 2020-01-01 RX ADMIN — Medication 1 PUFF(S): at 20:27

## 2020-01-01 RX ADMIN — BUDESONIDE AND FORMOTEROL FUMARATE DIHYDRATE 2 PUFF(S): 160; 4.5 AEROSOL RESPIRATORY (INHALATION) at 07:50

## 2020-01-01 RX ADMIN — ALBUTEROL 2 PUFF(S): 90 AEROSOL, METERED ORAL at 02:09

## 2020-01-01 RX ADMIN — ENOXAPARIN SODIUM 40 MILLIGRAM(S): 100 INJECTION SUBCUTANEOUS at 10:18

## 2020-01-01 RX ADMIN — CEFEPIME 100 MILLIGRAM(S): 1 INJECTION, POWDER, FOR SOLUTION INTRAMUSCULAR; INTRAVENOUS at 04:58

## 2020-01-01 RX ADMIN — Medication 1 PUFF(S): at 08:11

## 2020-01-01 RX ADMIN — Medication 6 MILLIGRAM(S): at 05:39

## 2020-01-01 RX ADMIN — Medication 1 PUFF(S): at 20:48

## 2020-01-01 RX ADMIN — CEFEPIME 100 MILLIGRAM(S): 1 INJECTION, POWDER, FOR SOLUTION INTRAMUSCULAR; INTRAVENOUS at 21:34

## 2020-01-01 RX ADMIN — BUDESONIDE AND FORMOTEROL FUMARATE DIHYDRATE 2 PUFF(S): 160; 4.5 AEROSOL RESPIRATORY (INHALATION) at 20:27

## 2020-01-01 RX ADMIN — ALBUTEROL 2 PUFF(S): 90 AEROSOL, METERED ORAL at 14:44

## 2020-01-01 RX ADMIN — Medication 1 PUFF(S): at 08:42

## 2020-01-01 RX ADMIN — ALBUTEROL 2 PUFF(S): 90 AEROSOL, METERED ORAL at 20:39

## 2020-01-01 RX ADMIN — AZITHROMYCIN 255 MILLIGRAM(S): 500 TABLET, FILM COATED ORAL at 20:19

## 2020-01-01 RX ADMIN — ENOXAPARIN SODIUM 40 MILLIGRAM(S): 100 INJECTION SUBCUTANEOUS at 09:16

## 2020-01-01 RX ADMIN — CEFEPIME 100 MILLIGRAM(S): 1 INJECTION, POWDER, FOR SOLUTION INTRAMUSCULAR; INTRAVENOUS at 11:15

## 2020-01-01 RX ADMIN — Medication 1 PUFF(S): at 14:44

## 2020-01-01 RX ADMIN — AZITHROMYCIN 255 MILLIGRAM(S): 500 TABLET, FILM COATED ORAL at 19:48

## 2020-01-01 RX ADMIN — Medication 1 PUFF(S): at 13:11

## 2020-01-01 RX ADMIN — Medication 6 MILLIGRAM(S): at 05:41

## 2020-01-01 RX ADMIN — Medication 6 MILLIGRAM(S): at 05:16

## 2020-01-01 RX ADMIN — Medication 1 PUFF(S): at 01:55

## 2020-01-01 RX ADMIN — ENOXAPARIN SODIUM 40 MILLIGRAM(S): 100 INJECTION SUBCUTANEOUS at 09:13

## 2020-01-01 RX ADMIN — CEFEPIME 100 MILLIGRAM(S): 1 INJECTION, POWDER, FOR SOLUTION INTRAMUSCULAR; INTRAVENOUS at 09:13

## 2020-01-01 SDOH — SOCIAL STABILITY - SOCIAL INSECURITY: DISRUPTION OF FAMILY BY SEPARATION AND DIVORCE: Z63.5

## 2020-03-17 ENCOUNTER — NON-APPOINTMENT (OUTPATIENT)
Age: 65
End: 2020-03-17

## 2020-03-24 ENCOUNTER — APPOINTMENT (OUTPATIENT)
Dept: OTOLARYNGOLOGY | Facility: CLINIC | Age: 65
End: 2020-03-24
Payer: MEDICARE

## 2020-03-24 VITALS
HEART RATE: 91 BPM | WEIGHT: 145 LBS | BODY MASS INDEX: 21.48 KG/M2 | DIASTOLIC BLOOD PRESSURE: 81 MMHG | HEIGHT: 69 IN | SYSTOLIC BLOOD PRESSURE: 149 MMHG

## 2020-03-24 PROCEDURE — 31575 DIAGNOSTIC LARYNGOSCOPY: CPT

## 2020-03-24 PROCEDURE — 99204 OFFICE O/P NEW MOD 45 MIN: CPT | Mod: 25

## 2020-03-24 RX ORDER — TIOTROPIUM BROMIDE 18 UG/1
18 CAPSULE ORAL; RESPIRATORY (INHALATION) DAILY
Qty: 30 | Refills: 6 | Status: DISCONTINUED | COMMUNITY
Start: 2018-12-20 | End: 2020-03-24

## 2020-03-24 RX ORDER — BUDESONIDE AND FORMOTEROL FUMARATE DIHYDRATE 160; 4.5 UG/1; UG/1
160-4.5 AEROSOL RESPIRATORY (INHALATION) TWICE DAILY
Qty: 1 | Refills: 3 | Status: DISCONTINUED | COMMUNITY
Start: 2018-12-20 | End: 2020-03-24

## 2020-03-24 RX ORDER — ALBUTEROL SULFATE 90 UG/1
108 (90 BASE) AEROSOL, METERED RESPIRATORY (INHALATION) EVERY 6 HOURS
Refills: 0 | Status: DISCONTINUED | COMMUNITY
Start: 2018-12-20 | End: 2020-03-24

## 2020-03-24 NOTE — PROCEDURE
[Gag Reflex] : gag reflex preventing mirror examination [None] : none [Flexible Endoscope] : examined with the flexible endoscope [Serial Number: ___] : Serial Number: [unfilled] [de-identified] : No lesions in the NPx, OPx, HPx or larynx.  VC are mobile, airway patent.\par

## 2020-03-24 NOTE — HISTORY OF PRESENT ILLNESS
[de-identified] : 64 year old male, positive Hep C, referred by Dr. 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  Biopsy collected 3/5/2020 diagnosis left posterior mandible infiltrating squamous cell carcinoma, extending to biopsy margins.  History of smoking 1 pack per day x 40 years, quit 2 weeks ago.  History of alcohol abuse with beer, stopped 14 years ago.  He first noticed the lesion before the Holidays in December.  He denies any dyspnea, dysphagia, otalgia.

## 2020-03-24 NOTE — CONSULT LETTER
[Dear  ___] : Dear  [unfilled], [Consult Letter:] : I had the pleasure of evaluating your patient, [unfilled]. [Please see my note below.] : Please see my note below. [Consult Closing:] : Thank you very much for allowing me to participate in the care of this patient.  If you have any questions, please do not hesitate to contact me. [Sincerely,] : Sincerely, [FreeTextEntry2] : Dr. Nahid Regan\[address]\[place] [id number] [FreeTextEntry3] : Dev\par \par Anton Dixon MD FACS\par Division of Head and Neck Surgery\par Department of Otolaryngology \par [facility]\par \par  of Otolaryngology\par Assistant Professor of Surgery\par [facility] [employer] [facility]

## 2020-03-24 NOTE — PHYSICAL EXAM
[Laryngoscopy Performed] : laryngoscopy was performed, see procedure section for findings [FreeTextEntry1] : Erosive lesion with exposed mandibular ramus along the L. RMT.  No other lesions. [Normal] : no rashes

## 2020-03-27 ENCOUNTER — APPOINTMENT (OUTPATIENT)
Dept: CT IMAGING | Facility: CLINIC | Age: 65
End: 2020-03-27

## 2020-03-27 ENCOUNTER — APPOINTMENT (OUTPATIENT)
Dept: NUCLEAR MEDICINE | Facility: CLINIC | Age: 65
End: 2020-03-27
Payer: MEDICARE

## 2020-03-27 ENCOUNTER — OUTPATIENT (OUTPATIENT)
Dept: OUTPATIENT SERVICES | Facility: HOSPITAL | Age: 65
LOS: 1 days | End: 2020-03-27
Payer: MEDICARE

## 2020-03-27 DIAGNOSIS — C06.9 MALIGNANT NEOPLASM OF MOUTH, UNSPECIFIED: ICD-10-CM

## 2020-03-27 PROCEDURE — 78815 PET IMAGE W/CT SKULL-THIGH: CPT | Mod: 26,PI

## 2020-03-27 PROCEDURE — 70487 CT MAXILLOFACIAL W/DYE: CPT | Mod: 26

## 2020-03-27 PROCEDURE — 78815 PET IMAGE W/CT SKULL-THIGH: CPT

## 2020-03-27 PROCEDURE — 70487 CT MAXILLOFACIAL W/DYE: CPT

## 2020-03-27 PROCEDURE — A9552: CPT

## 2020-03-27 PROCEDURE — 70491 CT SOFT TISSUE NECK W/DYE: CPT | Mod: 26

## 2020-03-27 PROCEDURE — 82565 ASSAY OF CREATININE: CPT

## 2020-03-27 PROCEDURE — 70491 CT SOFT TISSUE NECK W/DYE: CPT

## 2020-03-30 ENCOUNTER — APPOINTMENT (OUTPATIENT)
Dept: CT IMAGING | Facility: CLINIC | Age: 65
End: 2020-03-30
Payer: MEDICARE

## 2020-03-30 ENCOUNTER — OUTPATIENT (OUTPATIENT)
Dept: OUTPATIENT SERVICES | Facility: HOSPITAL | Age: 65
LOS: 1 days | End: 2020-03-30
Payer: MEDICARE

## 2020-03-30 ENCOUNTER — RESULT REVIEW (OUTPATIENT)
Age: 65
End: 2020-03-30

## 2020-03-30 DIAGNOSIS — Z00.8 ENCOUNTER FOR OTHER GENERAL EXAMINATION: ICD-10-CM

## 2020-03-30 PROCEDURE — 75635 CT ANGIO ABDOMINAL ARTERIES: CPT

## 2020-03-30 PROCEDURE — 82565 ASSAY OF CREATININE: CPT

## 2020-03-30 PROCEDURE — 75635 CT ANGIO ABDOMINAL ARTERIES: CPT | Mod: 26

## 2020-03-31 ENCOUNTER — APPOINTMENT (OUTPATIENT)
Dept: OTOLARYNGOLOGY | Facility: CLINIC | Age: 65
End: 2020-03-31
Payer: MEDICARE

## 2020-03-31 ENCOUNTER — APPOINTMENT (OUTPATIENT)
Dept: THORACIC SURGERY | Facility: CLINIC | Age: 65
End: 2020-03-31
Payer: MEDICARE

## 2020-03-31 VITALS
DIASTOLIC BLOOD PRESSURE: 83 MMHG | HEIGHT: 69 IN | HEART RATE: 97 BPM | SYSTOLIC BLOOD PRESSURE: 151 MMHG | TEMPERATURE: 98.4 F | RESPIRATION RATE: 17 BRPM | WEIGHT: 144 LBS | BODY MASS INDEX: 21.33 KG/M2

## 2020-03-31 VITALS
WEIGHT: 145 LBS | BODY MASS INDEX: 21.48 KG/M2 | HEART RATE: 93 BPM | HEIGHT: 69 IN | DIASTOLIC BLOOD PRESSURE: 86 MMHG | SYSTOLIC BLOOD PRESSURE: 126 MMHG

## 2020-03-31 DIAGNOSIS — Z00.00 ENCOUNTER FOR GENERAL ADULT MEDICAL EXAMINATION W/OUT ABNORMAL FINDINGS: ICD-10-CM

## 2020-03-31 PROCEDURE — 99215 OFFICE O/P EST HI 40 MIN: CPT

## 2020-03-31 PROCEDURE — 99205 OFFICE O/P NEW HI 60 MIN: CPT

## 2020-03-31 NOTE — HISTORY OF PRESENT ILLNESS
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 History of smoking 1 pack per day x 40 years, quit early March. History of alcohol abuse with beer, stopped 14 years ago. Pt denies dysphonia, dysphagia, pain, SOB, otalgia.  Pt has trismus.  Eating a regular diet.  Maintaining weight. Pt had PET scan which showed a lung nodule.  Pt saw Dr. Davis today.  Pt is here to talk about the plan of care. \par \par

## 2020-03-31 NOTE — REASON FOR VISIT
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 \par

## 2020-03-31 NOTE — CONSULT LETTER
[Dear  ___] : Dear  [unfilled], [Courtesy Letter:] : I had the pleasure of seeing your patient, [unfilled], in my office today. [Please see my note below.] : Please see my note below. [Consult Closing:] : Thank you very much for allowing me to participate in the care of this patient.  If you have any questions, please do not hesitate to contact me. [Sincerely,] : Sincerely, [FreeTextEntry2] : Dr. Nahid Regan\[address]\[place] [id number]  [FreeTextEntry3] : Dev\par \par Anton Dixon MD FACS\par Division of Head and Neck Surgery\par Department of Otolaryngology \par [facility]\par \par  of Otolaryngology\par Assistant Professor of Surgery\par [facility] [employer] at [facility]

## 2020-03-31 NOTE — PHYSICAL EXAM
[FreeTextEntry1] : Erosive lesion with exposed mandibular ramus along the L. RMT.  No other lesions. [Normal] : no rashes

## 2020-04-01 ENCOUNTER — TRANSCRIPTION ENCOUNTER (OUTPATIENT)
Age: 65
End: 2020-04-01

## 2020-04-03 NOTE — DATA REVIEWED
[FreeTextEntry1] : PET/CT on 03/27/2020:\par - 1.1 cm minimally FDG avid spiculated ESTELLA nodule, new since 11/26/2018. \par - severe centrilobular emphysema.\par - tree-in-bud opacities RLL (image 109), decreased since 2018\par - FDG avid mass left retromolar trigone/posterior mandible, (SUV 19.1; image 92) head and neck series with extension into the left mandible (SUV 17.3; image 92). Left level 2A lymph node 1.2 x 0.9 cm (SUV 13.5; image 104). \par

## 2020-04-03 NOTE — CONSULT LETTER
[Dear  ___] : Dear  [unfilled], [Courtesy Letter:] : I had the pleasure of seeing your patient, [unfilled], in my office today. [( Thank you for referring [unfilled] for consultation for _____ )] : Thank you for referring [unfilled] for consultation for [unfilled] [Please see my note below.] : Please see my note below. [Consult Closing:] : Thank you very much for allowing me to participate in the care of this patient.  If you have any questions, please do not hesitate to contact me. [Sincerely,] : Sincerely, [FreeTextEntry2] : Dev Mynor Dixon (JR/Ref) [FreeTextEntry3] : Liam Davis MD, FACS \par Chief, Division of Thoracic Surgery \par Director, Minimally Invasive Thoracic Surgery \par Department of Cardiovascular and Thoracic Surgery \par [facility] \par , Cardiovascular and Thoracic Surgery\par \par

## 2020-04-03 NOTE — PHYSICAL EXAM
[General Appearance - Alert] : alert [General Appearance - In No Acute Distress] : in no acute distress [Sclera] : the sclera and conjunctiva were normal [PERRL With Normal Accommodation] : pupils were equal in size, round, and reactive to light [Extraocular Movements] : extraocular movements were intact [Outer Ear] : the ears and nose were normal in appearance [Neck Appearance] : the appearance of the neck was normal [Neck Cervical Mass (___cm)] : no neck mass was observed [Jugular Venous Distention Increased] : there was no jugular-venous distention [Thyroid Diffuse Enlargement] : the thyroid was not enlarged [Thyroid Nodule] : there were no palpable thyroid nodules [Normal Rate] : the respiratory rate was normal [Normal Rhythm/Effort] : normal respiratory rhythm and effort [Decreased Breath Sounds] : breath sounds were decreased diffusely [Heart Rate And Rhythm] : heart rate was normal and rhythm regular [Heart Sounds] : normal S1 and S2 [Heart Sounds Gallop] : no gallops [Murmurs] : no murmurs [Heart Sounds Pericardial Friction Rub] : no pericardial rub [Examination Of The Chest] : the chest was normal in appearance [Chest Visual Inspection Thoracic Asymmetry] : no chest asymmetry [Diminished Respiratory Excursion] : normal chest expansion [2+] : left 2+ [No Abnormalities] : the abdominal aorta was not enlarged and no bruit was heard [Breast Appearance] : normal in appearance [Breast Palpation Mass] : no palpable masses [Bowel Sounds] : normal bowel sounds [Abdomen Soft] : soft [Abdomen Tenderness] : non-tender [Abdomen Mass (___ Cm)] : no abdominal mass palpated [Cervical Lymph Nodes Enlarged Posterior Bilaterally] : posterior cervical [Cervical Lymph Nodes Enlarged Anterior Bilaterally] : anterior cervical [Supraclavicular Lymph Nodes Enlarged Bilaterally] : supraclavicular [No CVA Tenderness] : no ~M costovertebral angle tenderness [No Spinal Tenderness] : no spinal tenderness [Abnormal Walk] : normal gait [Nail Clubbing] : no clubbing  or cyanosis of the fingernails [Musculoskeletal - Swelling] : no joint swelling seen [Motor Tone] : muscle strength and tone were normal [Skin Color & Pigmentation] : normal skin color and pigmentation [Skin Turgor] : normal skin turgor [] : no rash [Deep Tendon Reflexes (DTR)] : deep tendon reflexes were 2+ and symmetric [Sensation] : the sensory exam was normal to light touch and pinprick [No Focal Deficits] : no focal deficits [Oriented To Time, Place, And Person] : oriented to person, place, and time [Impaired Insight] : insight and judgment were intact [Affect] : the affect was normal [Right Carotid Bruit] : no bruit heard over the right carotid [Left Carotid Bruit] : no bruit heard over the left carotid [Right Femoral Bruit] : no bruit heard over the right femoral artery [Left Femoral Bruit] : no bruit heard over the left femoral artery [FreeTextEntry1] : Deferred

## 2020-04-03 NOTE — ASSESSMENT
[FreeTextEntry1] : Mr. ELVA CASON, 64 year old male, former smoker (recently quitted in 03/2020), w/ hx of Hep C, ?DVT (not on Plavix), cirrhosis, COPD, recent diagnosed left posterior mandible infiltrating squamous cell carcinoma (03/05/2020), who referred by Dr. Dixon for lung nodule. \par \par PET/CT on 03/27/2020:\par - 1.1 cm minimally FDG avid spiculated ESTELLA nodule, new since 11/26/2018. \par - severe centrilobular emphysema.\par - tree-in-bud opacities RLL (image 109), decreased since 2018\par - FDG avid mass left retromolar trigone/posterior mandible, (SUV 19.1; image 92) head and neck series with extension into the left mandible (SUV 17.3; image 92). Left level 2A lymph node 1.2 x 0.9 cm (SUV 13.5; image 104). \par \par I have reviewed the patient's medical records and diagnostic images at time of this office consultation and have made the following recommendation:\par 1. PET/CT reviewed and explained to patient and his family member, I recommended a Flexible bronchoscopy, Left VATS, lung resection with Gwendolyn-Strips. Risks and benefits and alternatives explained to patient, all questions answered, patient agreed to proceed with surgery.\par 2. PFTs. \par 3. Cardiac clearance. \par \par I personally performed the services described in the documentation, reviewed the documentation recorded by the scribe in my presence and it accurately and completely records my words and actions.\par \par I, Princess Steele NP, am scribing for and the presence of MARLEN Chilel, the following sections HISTORY OF PRESENT ILLNESS, PAST MEDICAL/FAMILY/SOCIAL HISTORY; REVIEW OF SYSTEMS; VITAL SIGNS; PHYSICAL EXAM; DISPOSITION.

## 2020-04-03 NOTE — HISTORY OF PRESENT ILLNESS
[FreeTextEntry1] : Mr. ELVA CASON, 64 year old male, former smoker (recently quitted in 03/2020), w/ hx of Hep C, ?DVT (not on Plavix), cirrhosis, COPD, recent diagnosed left posterior mandible infiltrating squamous cell carcinoma (03/05/2020), who referred by Dr. Dixon for lung nodule. \par \par PET/CT on 03/27/2020:\par - 1.1 cm minimally FDG avid spiculated ESTELLA nodule, new since 11/26/2018. \par - severe centrilobular emphysema.\par - tree-in-bud opacities RLL (image 109), decreased since 2018\par - FDG avid mass left retromolar trigone/posterior mandible, (SUV 19.1; image 92) head and neck series with extension into the left mandible (SUV 17.3; image 92). Left level 2A lymph node 1.2 x 0.9 cm (SUV 13.5; image 104). \par \par Patient is here today for CT surgery consultation. Patient c/o SOB on exertion, chronic cough with clear sputum, some weight loss (cannot recall), denies chest pain, fever, chills, loss of appetite, or hemoptysis.

## 2020-04-08 ENCOUNTER — OUTPATIENT (OUTPATIENT)
Dept: OUTPATIENT SERVICES | Facility: HOSPITAL | Age: 65
LOS: 1 days | Discharge: HOME | End: 2020-04-08

## 2020-04-08 DIAGNOSIS — D64.9 ANEMIA, UNSPECIFIED: ICD-10-CM

## 2020-04-09 ENCOUNTER — LABORATORY RESULT (OUTPATIENT)
Age: 65
End: 2020-04-09

## 2020-04-09 ENCOUNTER — APPOINTMENT (OUTPATIENT)
Dept: HEMATOLOGY ONCOLOGY | Facility: CLINIC | Age: 65
End: 2020-04-09
Payer: MEDICARE

## 2020-04-09 DIAGNOSIS — Z80.0 FAMILY HISTORY OF MALIGNANT NEOPLASM OF DIGESTIVE ORGANS: ICD-10-CM

## 2020-04-09 PROCEDURE — 99205 OFFICE O/P NEW HI 60 MIN: CPT | Mod: 95

## 2020-04-09 NOTE — REASON FOR VISIT
[Initial Consultation] : an initial consultation [Spouse] : spouse [FreeTextEntry2] : head and neck cancer

## 2020-04-09 NOTE — ASSESSMENT
[FreeTextEntry1] : 65 yo m with recently diagnosed sq cell ca of the left mandible/FOM/RMT, stage IVB due to bone extension and perhaps even IVC if lung nodule is a met. \par Discussed stage and pathology, reviewed all images\par Discussed that surgical resection would have been SOC but due to COVID crisis, elective surgeries are not being done and hence it would be appropriate and c/w NCCN guidelines to consider neoadjuvant intent chemotherapy. \par Also, bx or resection of lung nodule would have been ideal but due to severe COPD, location of tumor and COVID crisis, this is not feasible at this time.\par As noted above, pt has significant comorbidities and hence, chemotherapy regimen will need to be carefully tailored based on labs and Hep C viral load. \par I would also like to get input from rad onc, Dr. Pittman regarding role for RT in the neoadjuvant space since chemo with and without RT would be different. \par I discussed all this with pt and his ex-wife, who are both in agreement. \par Pt prefers all tx be done close to home which is near our [facility] he would like to connect with me once more after labs and visit with radiation oncology. \par I answered all questions to the patient's apparent satisfaction\par I reiterated importance of staying away from tobacco\par Educated patient about COVID_19 pandemic. Discussed higher risk for complications in infected with the virus given immunosuppressed state due to cancer. Recommended universal precautions and discouraged attending parties or gatherings. Recommended frequent hand-washing and hand  use.\par GIven the current pandemic with COVID-19, there may be deviations from standard of care practices to minimize exposure and maximize outcomes in this situation. Patient has been educated about this unique situation and changes made to treatment plan specifically due to COVID-19 pandemic- in this case, consideration of neoadjuvant tx instead of upfront surgery. Patient was reassured that all decisions were made based on available literature and after multidisciplinary discussion.\par \par \par

## 2020-04-09 NOTE — PHYSICAL EXAM
[Restricted in physically strenuous activity but ambulatory and able to carry out work of a light or sedentary nature] : Status 1- Restricted in physically strenuous activity but ambulatory and able to carry out work of a light or sedentary nature, e.g., light house work, office work [Thin] : thin [Normal] : PERRL, EOMI, no conjunctival infection, anicteric [de-identified] : slight fullness over left jaw

## 2020-04-09 NOTE — HISTORY OF PRESENT ILLNESS
[Home] : at home, [unfilled] , at the time of the visit. [Medical Office: ([facility])___] : at ~his/her~ medical office located in [place] [Other:____] : [unfilled] [Patient] : the patient [Self] : self [Disease: _____________________] : Disease: [unfilled] [T: ___] : T[unfilled] [N: ___] : N[unfilled] [M: ___] : M[unfilled] [AJCC Stage: ____] : AJCC Stage: [unfilled] [FreeTextEntry2] : Nahid Echols [de-identified] : 64 year old male, positive Hep C sec to IVDU, cirrhosis, aortic aneurysm, vascular disease, COPD, former smoker (1 ppd x 40 years, quit 2 weeks ago), former etoh abuse (quit in [id number]), who started experiencing left jaw pain and trismus in December 2019. He subsequently noted a nodule on the inside of his left jaw. He saw his dentist who referred him to OMFS. He underwent bx of the nodule and this was c/w sq cell ca.  He was referred to Dr. Dixon. PET/CT on 03/27/2020: showed 1.1 cm minimally FDG avid spiculated ESTELLA nodule, new since 11/26/2018. Severe centrilobular emphysema. Tree-in-bud opacities RLL (image 109), decreased since 2018. FDG avid mass left retromolar trigone/posterior mandible, (SUV 19.1; image 92) head and neck series with extension into the left mandible (SUV 17.3; image 92). Left level 2A lymph node 1.2 x 0.9 cm (SUV 13.5; image 104). Pt saw Dr. Davis who recommended a bx which was later felt to be risky given location and severe emphysema.Wedge resection at a late time was discussed. Surgery for mandibular/FOM cancer was discussed but deferred due to current COVID situation. The patient is being referred to med onc for discussion of neoadjuvant tx,\par \par Pt denies dysphonia, dysphagia, pain, SOB, otalgia. He is eating a regular diet but has lost an un-quantified amount of weight over the last few years. Maintaining weight.\par  [de-identified] : sq cell ca [de-identified] : Unclear if lung nodule is separate primary, met of other (no path)

## 2020-04-09 NOTE — REVIEW OF SYSTEMS
[Recent Change In Weight] : ~T recent weight change [Leg Claudication] : intermittent leg claudication [SOB on Exertion] : shortness of breath during exertion [Negative] : Allergic/Immunologic [FreeTextEntry4] : as above

## 2020-04-17 PROBLEM — I71.9 AORTIC ANEURYSM: Status: ACTIVE | Noted: 2020-01-01

## 2020-04-17 PROBLEM — Z63.5 DIVORCED: Status: ACTIVE | Noted: 2020-01-01

## 2020-04-17 PROBLEM — Z87.01 HISTORY OF PNEUMONIA: Status: RESOLVED | Noted: 2018-12-20 | Resolved: 2020-01-01

## 2020-04-22 PROBLEM — Z87.898 FORMER CONSUMPTION OF ALCOHOL: Status: ACTIVE | Noted: 2020-04-09

## 2020-04-22 NOTE — ASSESSMENT
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 Clinical stage T4,N1 IV B.\par Standard of care treatment would be surgical resection with bone graft followed by adjuvant radiation with/ or without chemotherapy. Due to severe peripheral vascular disease he is not a candidate for a standard fibula graft. Due to COVID pandemic with   extremely high rate of infection  in community and extreme volume of COVID patients in hospitals/ ICU,   his surgery cannot be performed at this time.\par He was evaluated by radiation oncology and preoperative versus definitive radiotherapy was recommended.\par Due to his comorbidities/ overall performance status he would not tolerate neoadjuvant induction chemotherapy but he should be able to tolerate concurrent chemotherapy with radiation. Concurrent chemoradiation would be more effective compared to radiation alone/ should improve his outcome.\par He has mild renal dysfunction( new compared to his prior lab results)- possibly component of dehydration. He is not a candidate for high dose cisplatin but should tolerate weekly cisplatin with close monitoring and  aggressive hydration.\par Discussed benefits and potential side effects of cisplatin chemotherapy. \par Patient agrees with the plan.\par \par Lung nodule- most likely second primary rather than metastatic disease-  will need wedge resection versus biopsy/ radiosurgery - to be done after radiation is completed .\par \par Return visit for chemotherapy talk with NP and to start chemotherapy on day 1 of radiation.\par Plan discussed also with Dr Pittman.\par \par

## 2020-04-22 NOTE — CONSULT LETTER
[Dear  ___] : Dear  [unfilled], [Please see my note below.] : Please see my note below. [Consult Closing:] : Thank you very much for allowing me to participate in the care of this patient.  If you have any questions, please do not hesitate to contact me. [Sincerely,] : Sincerely, [Consult Letter:] : I had the pleasure of evaluating your patient, [unfilled]. [FreeTextEntry2] : Dr Anton Dixon [FreeTextEntry3] : Ivon House MD

## 2020-04-22 NOTE — HISTORY OF PRESENT ILLNESS
[Home] : at home, [unfilled] , at the time of the visit. [Medical Office: ([facility])___] : at the medical office located in  [Other:____] : [unfilled] [Patient] : the patient [Disease: _____________________] : Disease: [unfilled] [N: ___] : N[unfilled] [T: ___] : T[unfilled] [AJCC Stage: ____] : AJCC Stage: [unfilled] [de-identified] : 65 y/o male presented with left lower jaw pain and oral nodule on the inside of his left jaw. Biopsy of the mass was c/w squamous cell cancer.\par He has 40 pack year history of smoking, quit in early March 2020. he has history of alcohol abuse, quit 14 years ago.\par PET/CT 3/27/20 : 1.1 cm minimally avid spiculated ESTELLA nodule ( new since 11/2026). FDG avid mass left retromolar trigone/ posterior mandible SUV 19.1 with extension into left mandible Left level 2A lymph node 1.2 x 0.9 cm SUV 13.5. CT guided lung biopsy was felt to be high risk due to location and severe emphysema\par He was evaluated by thoracic surgery Dr Davis and also his case was presented at  head and neck multidisciplinary tumor board. Standard of care would have been wedge resection and oral cavity resection. At present time surgery cannot be performed  safely due to COVID pandemic / extremely high volume of COVID patients in hospitals/ ICU. He was referred to radiation oncology and medical oncology for consideration for neoadjuvant therapy/ or definitive nonsurgical management.\par \par Patient denies weight loss. C/o left jaw discomfort but he can continue to eat  regular diet. \par He has significant comorbidities including COPD, liver cirrhosis, significant peripheral vascular disease.\par \par   [de-identified] : squamous cell cancer  [FreeTextEntry4] : Myah Echols [de-identified] : PDL1 combined positive score 1

## 2020-05-15 NOTE — REVIEW OF SYSTEMS
[Shortness Of Breath] : no shortness of breath [Negative] : Allergic/Immunologic [FreeTextEntry2] : slight fatigue [FreeTextEntry4] : per HPI

## 2020-05-15 NOTE — ASSESSMENT
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 Clinical stage T4,N1 IV B.\par \par \par Currently receiving weekly cisplatin with RT. Today chemo week 3. Tolerating  quite well so far.\par Discussed nutrition.\par Antiemetics. He will start Protonix daily.\par Exam with week 4. \par \par Lung nodule- most likely second primary rather than metastatic disease-  will need wedge resection versus biopsy/ radiosurgery - to be done after radiation is completed .\par \par

## 2020-05-15 NOTE — HISTORY OF PRESENT ILLNESS
[Disease: _____________________] : Disease: [unfilled] [T: ___] : T[unfilled] [N: ___] : N[unfilled] [AJCC Stage: ____] : AJCC Stage: [unfilled] [Home] : at home, [unfilled] , at the time of the visit. [Medical Office: ([facility])___] : at the medical office located in  [Other:____] : [unfilled] [Patient] : the patient [de-identified] : 63 y/o male presented with left lower jaw pain and oral nodule on the inside of his left jaw. Biopsy of the mass was c/w squamous cell cancer.\par He has 40 pack year history of smoking, quit in early March 2020. he has history of alcohol abuse, quit 14 years ago.\par PET/CT 3/27/20 : 1.1 cm minimally avid spiculated ESTELLA nodule ( new since 11/2026). FDG avid mass left retromolar trigone/ posterior mandible SUV 19.1 with extension into left mandible Left level 2A lymph node 1.2 x 0.9 cm SUV 13.5. CT guided lung biopsy was felt to be high risk due to location and severe emphysema\par He was evaluated by thoracic surgery Dr Davis and also his case was presented at  head and neck multidisciplinary tumor board. Standard of care would have been wedge resection and oral cavity resection. At present time surgery cannot be performed  safely due to COVID pandemic / extremely high volume of COVID patients in hospitals/ ICU. He was referred to radiation oncology and medical oncology for consideration for neoadjuvant therapy/ or definitive nonsurgical management.\par \par \par He has significant comorbidities including COPD, liver cirrhosis, significant peripheral vascular disease.\par \par  Started RT with weekly cisplatin on 4/30/20.  [de-identified] : squamous cell cancer  [de-identified] : PDL1 combined positive score 1  [Treatment Protocol] : Treatment Protocol [FreeTextEntry1] : weekly cisplatin with RT 4/30/20  [de-identified] : Today week 3 of weekly cisplatin with RT. \par Doing OK so far. Mild nausea yest ( first time since started)- took compazine and it helped. Has protonix but not taking it. Eats oatmeal, Ensure, soups , pasta. Did not loose weight. Drinks ginger ale. Denies dyspnea. Occ cough ( clearing secretions, color clear). No neuropathy [FreeTextEntry4] : Myah Echols detailed exam

## 2020-05-15 NOTE — PHYSICAL EXAM
[Restricted in physically strenuous activity but ambulatory and able to carry out work of a light or sedentary nature] : Status 1- Restricted in physically strenuous activity but ambulatory and able to carry out work of a light or sedentary nature, e.g., light house work, office work [de-identified] : looks OK  [Normal] : affect appropriate

## 2020-05-26 NOTE — PROCEDURE
[Gag Reflex] : gag reflex preventing mirror examination [None] : none [Flexible Endoscope] : examined with the flexible endoscope [Serial Number: ___] : Serial Number: [unfilled] [de-identified] : No lesions in the NPx, OPx, HPx or larynx.  VC are mobile, airway patent.  Mild mucositis.  \par

## 2020-05-26 NOTE — PHYSICAL EXAM
[Normal] : no rashes [FreeTextEntry1] : Erosive lesion with exposed mandibular ramus along the L. RMT - significantly improved from exams.  No other lesions.  Patient with mild mucositis.

## 2020-05-26 NOTE — HISTORY OF PRESENT ILLNESS
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 History of smoking 1 pack per day x 40 years, quit early March. History of alcohol abuse with beer, stopped 14 years ago. Pt is in his 4th week of CXRT with Dr. Pittman and Dr. House.  Dr. Pittman wants Dr. Dixon to evaluate the mass because it has decreased in size.  Pt is eating by mouth and taking ensure supplements.  PT has a small amount of pain and he is using oral rinse for relief.

## 2020-05-26 NOTE — CONSULT LETTER
[Dear  ___] : Dear  [unfilled], [Courtesy Letter:] : I had the pleasure of seeing your patient, [unfilled], in my office today. [Please see my note below.] : Please see my note below. [Consult Closing:] : Thank you very much for allowing me to participate in the care of this patient.  If you have any questions, please do not hesitate to contact me. [Sincerely,] : Sincerely, [FreeTextEntry2] : Dr. Nahid Regan\[address]\[place] [id number]  [FreeTextEntry3] : Dev\par \par Anton Dixon MD FACS\par Division of Head and Neck Surgery\par Department of Otolaryngology \par [facility]\par \par  of Otolaryngology\par Assistant Professor of Surgery\par [facility] [employer] [facility]

## 2020-06-15 NOTE — REVIEW OF SYSTEMS
[Negative] : Allergic/Immunologic [Shortness Of Breath] : no shortness of breath [FreeTextEntry7] : loss of appetite  [FreeTextEntry4] : oral soreness from RT  [FreeTextEntry2] : slight fatigue [de-identified] : minimal numbness/ tingling

## 2020-06-15 NOTE — HISTORY OF PRESENT ILLNESS
[T: ___] : T[unfilled] [Disease: _____________________] : Disease: [unfilled] [AJCC Stage: ____] : AJCC Stage: [unfilled] [N: ___] : N[unfilled] [Treatment Protocol] : Treatment Protocol [Other:____] : [unfilled] [de-identified] : 63 y/o male presented with left lower jaw pain and oral nodule on the inside of his left jaw. Biopsy of the mass was c/w squamous cell cancer.\par He has 40 pack year history of smoking, quit in early March 2020. he has history of alcohol abuse, quit 14 years ago.\par PET/CT 3/27/20 : 1.1 cm minimally avid spiculated ESTELLA nodule ( new since 11/2026). FDG avid mass left retromolar trigone/ posterior mandible SUV 19.1 with extension into left mandible Left level 2A lymph node 1.2 x 0.9 cm SUV 13.5. CT guided lung biopsy was felt to be high risk due to location and severe emphysema\par He was evaluated by thoracic surgery Dr Davis and also his case was presented at  head and neck multidisciplinary tumor board. Standard of care would have been wedge resection and oral cavity resection. At present time surgery cannot be performed  safely due to COVID pandemic / extremely high volume of COVID patients in hospitals/ ICU. He was referred to radiation oncology and medical oncology for consideration for neoadjuvant therapy/ or definitive nonsurgical management.\par \par \par He has significant comorbidities including COPD, liver cirrhosis, significant peripheral vascular disease.\par \par  Started RT with weekly cisplatin on 4/30/20. \par \par Clinically excellent response.\par \par Evaluated by ENT after 4 weeks of chemoRT- option of surgery ( standard of acre) versus complete definitive chemoRT was discussed. Felt to be high surgical risk due to comorbidities and suboptimal reconstruction ( limited by vasculopathy) .\par  [de-identified] : squamous cell cancer  [de-identified] : PDL1 combined positive score 1  [de-identified] : Today week 7 of weekly cisplatin with RT. \par \par Doing quite well. Able to maintain nutrition with soups and ensure. No neuropathy. More fatigued this week but overall " not too bad".  [FreeTextEntry1] : weekly cisplatin with RT 4/30/20  [FreeTextEntry4] : Myah Echols

## 2020-06-15 NOTE — PHYSICAL EXAM
[Restricted in physically strenuous activity but ambulatory and able to carry out work of a light or sedentary nature] : Status 1- Restricted in physically strenuous activity but ambulatory and able to carry out work of a light or sedentary nature, e.g., light house work, office work [Normal] : no JVD, no calf tenderness, venous stasis changes, varices [de-identified] : looks OK  [de-identified] : oral lesion markedly improved ; no neck adenopathy

## 2020-06-23 NOTE — PHYSICAL EXAM
[Laryngoscopy Performed] : laryngoscopy was performed, see procedure section for findings [Normal] : no rashes [de-identified] : Posttreatment changes, no LAD. [FreeTextEntry1] : Erosive lesion with exposed mandibular ramus along the L. RMT - significantly improved from exams.  No other lesions.  Patient with mild mucositis.

## 2020-06-23 NOTE — PROCEDURE
[Trismus] : trismus preventing mirror examination [None] : none [Flexible Endoscope] : examined with the flexible endoscope [Serial Number: ___] : Serial Number: [unfilled] [de-identified] : No lesions in the NPx, OPx, HPx or larynx. VC are mobile, airway patent. Mild mucositis.

## 2020-06-23 NOTE — CONSULT LETTER
[Dear  ___] : Dear  [unfilled], [Courtesy Letter:] : I had the pleasure of seeing your patient, [unfilled], in my office today. [Please see my note below.] : Please see my note below. [FreeTextEntry2] : Dr. Nahid Regan\[address]\[place] [phone]  [Sincerely,] : Sincerely, [Consult Closing:] : Thank you very much for allowing me to participate in the care of this patient.  If you have any questions, please do not hesitate to contact me. [FreeTextEntry3] : Dev\par \par Anton Dixon MD FACS\par Division of Head and Neck Surgery\par Department of Otolaryngology \par [facility]\par \par  of Otolaryngology\par Assistant Professor of Surgery\par [facility] [employer] at [facility]

## 2020-08-18 NOTE — PHYSICAL EXAM
[Laryngoscopy Performed] : laryngoscopy was performed, see procedure section for findings [Normal] : orientation to person, place, and time: normal [FreeTextEntry1] : Erosive lesion with exposed mandibular ramus along the L. RMT - significantly improved from original exams.  No other lesions.  Patient with mild mucositis. [de-identified] : Posttreatment changes, no LAD.

## 2020-08-18 NOTE — HISTORY OF PRESENT ILLNESS
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 History of smoking 1 pack per day x 40 years, quit early March. History of alcohol abuse with beer, stopped 14 years ago. Pt finished CXRT on 6/19/2020 with Dr. Pittman and Dr. House. Pt is eating by mouth and taking ensure supplements. Pt is starting to eat a little better since treatment finished. Pt is experiencing trismus.  He has to cut his food into small pieces. \par Pt is in the process of preparing for RT at [facility] for lung SCCa.\par

## 2020-08-18 NOTE — PROCEDURE
[Trismus] : trismus preventing mirror examination [None] : none [Flexible Endoscope] : examined with the flexible endoscope [de-identified] : No lesions in the NPx, OPx, HPx or larynx.  Stable posttreatment changes, VC are mobile, airway patent.\par  [Serial Number: ___] : Serial Number: [unfilled]

## 2020-10-12 PROBLEM — R91.1 LUNG NODULE: Status: ACTIVE | Noted: 2020-03-31

## 2020-10-12 NOTE — PROCEDURE
[Trismus] : trismus preventing mirror examination [None] : none [Flexible Endoscope] : examined with the flexible endoscope [Serial Number: ___] : Serial Number: [unfilled] [de-identified] : No lesions in the NPx, OPx, HPx or larynx.  Stable posttreatment changes, VC are mobile, airway patent.\par

## 2020-10-12 NOTE — PHYSICAL EXAM
[de-identified] : Posttreatment changes, no LAD. [Laryngoscopy Performed] : laryngoscopy was performed, see procedure section for findings [FreeTextEntry1] : Erosive lesion with exposed mandibular ramus along the L. RMT - significantly improved from original exams.  There is an area of necrotic bone debris but no significant mucosal lesions.  No other lesions.  Patient with trismus. [Normal] : no rashes

## 2020-10-12 NOTE — HISTORY OF PRESENT ILLNESS
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 History of smoking 1 pack per day x 40 years, quit early March. History of alcohol abuse with beer, stopped 14 years ago. Pt finished CXRT on 6/19/2020 with Dr. Pittman and Dr. House. Pt is eating by mouth and taking ensure supplements and soft diet .  Pt is experiencing trismus and jaw swollen and left ear pain.  He has to cut his food into small pieces.  last pet ct 10/8/2020. Pt also completed RT at [facility] ( Dr. Gutierrez) for lung SCCa 2 weeks ago.\par

## 2020-10-20 NOTE — HISTORY OF PRESENT ILLNESS
[Disease: _____________________] : Disease: [unfilled] [T: ___] : T[unfilled] [N: ___] : N[unfilled] [AJCC Stage: ____] : AJCC Stage: [unfilled] [Other:____] : [unfilled] [de-identified] : 65 y/o male presented with left lower jaw pain and oral nodule on the inside of his left jaw. Biopsy of the mass was c/w squamous cell cancer.\par He has 40 pack year history of smoking, quit in early March 2020. he has history of alcohol abuse, quit 14 years ago.\par PET/CT 3/27/20 : 1.1 cm minimally avid spiculated ESTELLA nodule ( new since 11/2026). FDG avid mass left retromolar trigone/ posterior mandible SUV 19.1 with extension into left mandible Left level 2A lymph node 1.2 x 0.9 cm SUV 13.5. CT guided lung biopsy was felt to be high risk due to location and severe emphysema\par He was evaluated by thoracic surgery Dr Davis and also his case was presented at  head and neck multidisciplinary tumor board. Standard of care would have been wedge resection and oral cavity resection. At present time surgery cannot be performed  safely due to COVID pandemic / extremely high volume of COVID patients in hospitals/ ICU. He was referred to radiation oncology and medical oncology for consideration for neoadjuvant therapy/ or definitive nonsurgical management.\par \par \par He has significant comorbidities including COPD, liver cirrhosis, significant peripheral vascular disease.\par \par  RT with weekly cisplatin  4/30/20-  6/15/20. \par \par \par \par Evaluated by ENT after 4 weeks of chemoRT- option of surgery ( standard of care versus complete definitive chemoRT was discussed. Felt to be high surgical risk due to comorbidities and suboptimal reconstruction ( limited by vasculopathy) .\par \par PET 10/4/20 : compared to [facility] 2020: avid focus left retromolar trigone / left palatine tosil- concern for residual disease versus post treatment inflammation.\par Minimally avid ( SUV 2.1    1.1 cm ESTELLA pulmonary nodule no change .  New mildly avid compression deformities T 6 and T 9 likely benign. \par \par Seen by Dr Dixon last week. EUA to evaluate persistent avid area recommended. Needs medical clearance. Reluctant to go for test. \par \par ESTELLA nodule- evaluated at [facility]  bx  c/w squamous cell cancer . S/p  stereotactic RT s/p RT ( Dr Gutierrez  ) completed mid September .\par \par  [de-identified] : squamous cell cancer  [de-identified] : PDL1 combined positive score 1  [de-identified] : developed pain in mouth- jaw.Also  Severe trismus. Difficult eating but can drink Ensure. Motrin for pain helps. Lost 20 lbs since spring. Continues to work.

## 2020-10-20 NOTE — REASON FOR VISIT
[Follow-Up Visit] : a follow-up [FreeTextEntry2] : oral cavity cancer   s/p chemoRT  - correction- this was not teleheath visit

## 2020-10-20 NOTE — ASSESSMENT
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 Clinical stage T4,N1 IV B.\par S/p  definitive chemoRT  completed in June 2020.\par \par recent PET- persistent uptake in area of primary tumor- residual disease versus post TX necrosis. Will need EUA biopsy to determine. Patient very reluctant  to have procedure.\par \par If decides to wait- will need follow up PET ( ~ 3 months- early january).\par \par Solitary lung nodule bx sqyuamous cell cancer- most likely separate lung priamry although metastatic lesion is a posssibility. S/p RT at [facility]- will be due for follow up imaging in JAn ( PET).\par \par Discussed nutrition.\par Will ask our nutritionist for follow up.\par \par \par Long discussion with patient and his ex wife Myah.\par \par return visit after biopsy ( if done)- or in Jan after PET. \par \par

## 2020-10-20 NOTE — REVIEW OF SYSTEMS
[Negative] : Allergic/Immunologic [Recent Change In Weight] : ~T recent weight change [Shortness Of Breath] : no shortness of breath [FreeTextEntry2] : slight fatigue [FreeTextEntry4] : per HPI  [FreeTextEntry7] : loss of appetite  [de-identified] : minimal numbness/ tingling

## 2020-10-26 NOTE — DISCHARGE NOTE ADULT - ABILITY TO HEAR (WITH HEARING AID OR HEARING APPLIANCE IF NORMALLY USED):
Adequate: hears normal conversation without difficulty
Spine appears normal, range of motion is not limited, no muscle or joint tenderness. No calf tenderness in lower extremities.

## 2020-12-08 NOTE — ED ADULT NURSE NOTE - OBJECTIVE STATEMENT
pt presents from [place] ( head and neck ca) with c/o worsening shortness of breath, cough and weakness.

## 2020-12-08 NOTE — ED ADULT TRIAGE NOTE - CHIEF COMPLAINT QUOTE
pt presents from [place] ( head and neck ca) with c/o worsening shortness of breath, cough and weakness.

## 2020-12-08 NOTE — H&P ADULT - HISTORY OF PRESENT ILLNESS
66 yo M with a [facility] squamous cell cancer of the jaw, COPD, cirrhosis, HCV, GERD, and left lung squamous cell cancer (of unknown etiology) who presents with SOB. For the past week, he has been having dyspnea, fatigue, and cough that has been gradually worsening. The cough is a yellow, mucoid cough, without blood. He denies chest pain, but endorses some wheezing. He has been laying in bed most of the past few days. He denies fevers, nausea/vomiting, abdominal pain, diarrhea, leg swelling, rash, or urinary complaints. He normally uses his Breo inhaler only as needed, but the past week he has used a at least 2-3 times.    Of note, his cancer of the left jaw was diagnosed in March 2020. He had chemotherapy from 04-06/2020. He was found to have a left lung apical lesion, which was biopsied in 07/2020 and found to be squamous cell cancer, although it was not clear its origin (primary lung vs metastasis). He had RT to the lung in 08/2020 and a PET scan in 10/2020 showed his left lung nodule to be stable at 1.2 x 0.8 cm, along with residual disease in his jaw. He states he is supposed to have a lung "procedure" later this month but he is not sure whet it is. He has not had surgery for his cancer.  he does acknowledge chronic dysphagia due to his cancer, takes Ensure with difficulty and is able to swallow liquids. he denies any worsening of this difficulty recently.  He has not had treatment for his HCV.    In the ED, he was given Cefepime 1 g IV x1 and Azithromycin 500 mg IV x1.

## 2020-12-08 NOTE — H&P ADULT - NSHPLABSRESULTS_GEN_ALL_CORE
Labs personally reviewed and interpreted. Notable for no leukocytosis (WBC 17.63) with 87.7% neutrophils and lymphopenia (0.83). Hb 10.6, plt 142, lactate normal at 1.2, INR 1.23, Na 135, K 4.5, Cl 102, HCO3 29, BUN elevated at 34, creatinine 0.92, BG 96, calcium 8.3 with albumin 2.3, alk phos normal at 65, AST elevated at 58, ALT normal at 42, and troponin minimally elevated at 0.047.    CXR personally reviewed and interpreted. Notable for elongated broad lungs, consistent with COPD. No focal consolidations, effusions, interstitial markings, pneumothorax, or obvious cardiomegaly.  CT chest pending.    EKG personally reviewed. Normal sinus rhythm, normal axis. Small Q waves in the inferior leads, no change from prior EKG. No ST changes. Rate 79, , QTc 431. Labs personally reviewed and interpreted. Notable for no leukocytosis (WBC 17.63) with 87.7% neutrophils and lymphopenia (0.83). Hb 10.6, plt 142, lactate normal at 1.2, INR 1.23, Na 135, K 4.5, Cl 102, HCO3 29, BUN elevated at 34, creatinine 0.92, BG 96, calcium 8.3 with albumin 2.3, alk phos normal at 65, AST elevated at 58, ALT normal at 42, and troponin minimally elevated at 0.047.  UA 0-2 WBCs/RBCs/hyaline and SG 1.020.  COVID-19 PCR results pending.    CXR personally reviewed and interpreted. Notable for elongated broad lungs, consistent with COPD. No focal consolidations, effusions, interstitial markings, pneumothorax, or obvious cardiomegaly.  CT chest pending.    EKG personally reviewed. Normal sinus rhythm, normal axis. Small Q waves in the inferior leads, no change from prior EKG. No ST changes. Rate 79, , QTc 431.

## 2020-12-08 NOTE — ED PROVIDER NOTE - CARE PLAN
Principal Discharge DX:	COPD (chronic obstructive pulmonary disease)  Secondary Diagnosis:	Hypoxia

## 2020-12-08 NOTE — ED ADULT NURSE REASSESSMENT NOTE - NS ED NURSE REASSESS COMMENT FT1
MD Zavaleta aware of repeat troponin. No interventions at this time. As per Meghann, do not need to upgrade pt to tele.

## 2020-12-08 NOTE — ED ADULT NURSE REASSESSMENT NOTE - NS ED NURSE REASSESS COMMENT FT1
Assumed care of pt from GAVIN Horowitz. Pt comfortable in bed, not c/o pain. States he does not need inhaler at this time. COVID resulted, but RVP still pending. Pt pending bed placement.

## 2020-12-08 NOTE — ED PROVIDER NOTE - CONSTITUTIONAL, MLM
normal... thin frail appearing elderly gentleman, tachypneic but comfortably resting in stretcher, awake, alert, oriented to person, place, time/situation

## 2020-12-08 NOTE — H&P ADULT - NSHPPHYSICALEXAM_GEN_ALL_CORE
Vital Signs Last 24 Hrs  T(C): 36.9 (08 Dec 2020 17:21), Max: 36.9 (08 Dec 2020 17:21)  T(F): 98.4 (08 Dec 2020 17:21), Max: 98.4 (08 Dec 2020 17:21)  HR: 97 (08 Dec 2020 17:21) (97 - 97)  BP: 100/61 (08 Dec 2020 17:21) (100/61 - 100/61)  BP(mean): --  RR: 24 (08 Dec 2020 17:21) (24 - 24)  SpO2: 83% (08 Dec 2020 17:21) (83% - 83%)    GENERAL: No acute distress  HEENT: PERRL, EOMI, MM dry, no oropharyngeal lesions  NECK: supple, no stiffness, no JVD, no thyromegaly  PULM: respirations non-labored, end-inspiratory and end-expiratory wheezes throughout right peripheral lung and occasional rhonchi LLL. No rales  CV: tachycardic with regular rhythm, II/VI LUSB systolic murmur. No gallops or rubs  GI: abdomen soft, nontender, nondistended, no masses felt, normal bowel sounds  MSK: strength 5/5 bilateral upper/lower extremities. No joint swelling, erythema, or warmth.  LYMPH: no anterior cervical, posterior cervical, supraclavicular, or inguinal lymphadenopathy  NEURO: A&Ox3, no tremors, sensation intact  SKIN: no rashes, lesions, or edema

## 2020-12-08 NOTE — ED PROVIDER NOTE - OBJECTIVE STATEMENT
66 y/o male with a PMHx of COPD presents to the ED c/o dyspnea. Pt is an ex smoker quit in March. Normal pulse ox is mid 90s, c/o dyspnea while at [facility] for follow up of his left jaw cancer. Was told he was very hypoxic and needed evaluation, states has been SOB for the past week. No chest pain, fevers, sick contacts. Last chemo/radiation was months ago is currently being evaluated for metastasis to the lung as they saw a "spot" on prior imaging.

## 2020-12-08 NOTE — H&P ADULT - NSICDXFAMILYHX_GEN_ALL_CORE_FT
FAMILY HISTORY:  Family history of pancreatic cancer    Father  Still living? Unknown  Family history of diabetes mellitus, Age at diagnosis: Age Unknown

## 2020-12-08 NOTE — H&P ADULT - NSICDXPASTMEDICALHX_GEN_ALL_CORE_FT
PAST MEDICAL HISTORY:  Cirrhosis     COPD (chronic obstructive pulmonary disease)     GERD (gastroesophageal reflux disease)     Hepatitis C     Nodule of left lung squamous cell cancer of unknown etiology    Squamous cell skin cancer, jawline left retromolar trigone

## 2020-12-08 NOTE — H&P ADULT - ASSESSMENT
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    1) Sepsis with Acute Hypoxic Respiratory Failure  - Ddx is broad, including bacterial vs viral pneumonia or worsening of lung squamous cell cancer  - COPD exacerbation also on ddx, although less likely sole diagnosis with WBC 17  - Last PET in October 2020 showed stable left apical lesion, 1.2 x 0.8 cm  - Check CT chest with IV contrast to further evaluate for pneumonia vs metastatic disease  - Albuterol/Ipratropium, O2 via NC, supportive therapy  - S/p IVFs  - Lactate normal  - Check O2 Q4H  - F/u blood cx  - F/u COVID-19 PCR  - C/w Cefepime/Azithromycin for now  - Troponin minimally elevated, likely due to demand, EKG unremarkable. 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  - Lung nodule with Bx 07/2020 showing squamous cell lung cancer, but unclear of origin (lung vs metastasis from jaw)  - F/u CT chest as noted above, workup as above  - Oncology consult    4) History of Hepatitis C  - With mild AST elevation  - No HCV treatment  - Reported history of cirrhosis  - F/u hepatic findings on chest CT  - Trend CMP    5) COPD  - Will give Symbicort (Breo equivalent)  - Albuterol/Ipratropium standing  - Hold off on steroids for now, less likely COPD exacerbation    6) Prophylactic measure  - DVT PPX: IMPROVE score of 3, will give Lovenox 40 mg SQ QD  - Diet: dysphagia 2, mechanical soft  - Dispo: pending improvement in sxs, weaning off O2    7) Advanced Care Planning/Counseling Discussion  - Discussed advanced care planning, including code status, with the patient. He stated that in the event of cardiac or pulmonary arrest, he would want CPR and/or intubation as otherwise medically indicated (FULL CODE). 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    1) Sepsis with Acute Hypoxic Respiratory Failure  - Ddx is broad, including bacterial vs viral pneumonia or worsening of lung squamous cell cancer  - COPD exacerbation also on ddx, although less likely sole diagnosis with WBC 17  - Last PET in October 2020 showed stable left apical lesion, 1.2 x 0.8 cm  - Check CT chest with IV contrast to further evaluate for pneumonia vs metastatic disease  - Albuterol/Ipratropium, O2 via NC, supportive therapy  - S/p IVFs  - Lactate normal  - Check O2 Q4H  - F/u blood cx  - F/u COVID-19 PCR  - C/w Cefepime/Azithromycin for now  - Troponin minimally elevated, likely due to demand, EKG unremarkable. 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  - Lung nodule with Bx 07/2020 showing squamous cell lung cancer, but unclear of origin (lung vs metastasis from jaw)  - F/u CT chest as noted above, workup as above  - Oncology consult    4) History of Hepatitis C  - With mild AST elevation  - No HCV treatment  - Reported history of cirrhosis  - F/u hepatic findings on chest CT  - Trend CMP  - Check HCV Ab and RNA    5) COPD  - Will give Symbicort (Breo equivalent)  - Albuterol/Ipratropium standing  - Hold off on steroids for now, less likely COPD exacerbation    6) Prophylactic measure  - DVT PPX: IMPROVE score of 3, will give Lovenox 40 mg SQ QD  - Diet: dysphagia 2, mechanical soft  - Dispo: pending improvement in sxs, weaning off O2    7) Advanced Care Planning/Counseling Discussion  - Discussed advanced care planning, including code status, with the patient. He stated that in the event of cardiac or pulmonary arrest, he would want CPR and/or intubation as otherwise medically indicated (FULL CODE). 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    1) Sepsis with Acute Hypoxic Respiratory Failure  - Meets sepsis criteria with leukocytosis, tachycardia, and suspected pulmonary source  - However, ddx is broad, including bacterial vs viral pneumonia or worsening of lung squamous cell cancer  - COPD exacerbation also on ddx, although less likely sole diagnosis with WBC 17  - Last PET in October 2020 showed stable left apical lesion, 1.2 x 0.8 cm  - Check CT chest with IV contrast to further evaluate for pneumonia vs metastatic disease  - Albuterol/Ipratropium, O2 via NC, supportive therapy  - S/p IVFs  - Lactate normal  - Check O2 Q4H  - F/u blood cx  - F/u COVID-19 PCR  - C/w Cefepime/Azithromycin for now  - Troponin minimally elevated, likely due to demand, EKG unremarkable. 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  - Lung nodule with Bx 07/2020 showing squamous cell lung cancer, but unclear of origin (lung vs metastasis from jaw)  - F/u CT chest as noted above, workup as above  - Oncology consult    4) History of Hepatitis C  - With mild AST elevation  - No HCV treatment  - Reported history of cirrhosis  - F/u hepatic findings on chest CT  - Trend CMP  - Check HCV Ab and RNA    5) COPD  - Will give Symbicort (Breo equivalent)  - Albuterol/Ipratropium standing  - Hold off on steroids for now, less likely COPD exacerbation    6) Prophylactic measure  - DVT PPX: IMPROVE score of 3, will give Lovenox 40 mg SQ QD  - Diet: dysphagia 2, mechanical soft  - Dispo: pending improvement in sxs, weaning off O2    7) Advanced Care Planning/Counseling Discussion  - Discussed advanced care planning, including code status, with the patient. He stated that in the event of cardiac or pulmonary arrest, he would want CPR and/or intubation as otherwise medically indicated (FULL CODE). 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    1/2) Pneumonia due to COVID-19 virus/Sepsis with Acute Hypoxic Respiratory Failure  - Meets sepsis criteria with leukocytosis, tachycardia, and suspected pulmonary source. Patient hypoxic to 83% on room air  - Last PET in October 2020 showed stable left apical lesion, 1.2 x 0.8 cm  - Obtained Chest CT with IV contrast, does not appear that lung squamous cell cancer is worsening  - COPD exacerbation less likely at this point  - UPDATE: Patient is COVID-19 positive  - Less likely superimposed bacterial pneumonia. However, will check procalcitonin and keep Cefepime/Azithromycin for now  - F/u blood cx  - ID consult  - Will give Dexamethasone 6 mg QD and ___________  - Albuterol/Ipratropium, O2 via NC, supportive therapy  - Check O2 Q4H  - S/p IVFs  - Lactate normal    3/4) Squamous cell cancer of retromolar trigone/squamous cell lung cancer  - Lung nodule with Bx 07/2020 showing squamous cell lung cancer, but unclear of origin (lung vs metastasis from jaw)  - CT chest does not show obvious interval progression  - Oncology consult    5) Elevated troponin  - Troponin minimally elevated x2, likely due to demand, EKG unremarkable. NO need to monitor on telemetry    6) History of Hepatitis C  - With mild AST elevation  - No HCV treatment  - Reported history of cirrhosis  - F/u hepatic findings on chest CT  - Trend CMP  - Check HCV Ab and RNA    7) COPD  - Will give Symbicort (Breo equivalent)  - Albuterol/Ipratropium standing  - Less likely COPD exacerbation    8) Prophylactic measure  - DVT PPX: IMPROVE score of 3, will give Lovenox 40 mg SQ QD  - Diet: dysphagia 2, mechanical soft  - Dispo: pending improvement in sxs, weaning off O2    9) Advanced Care Planning/Counseling Discussion  - Discussed advanced care planning, including code status, with the patient. He stated that in the event of cardiac or pulmonary arrest, he would want CPR and/or intubation as otherwise medically indicated (FULL CODE). 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    1/2) Pneumonia due to COVID-19 virus/Sepsis with Acute Hypoxic Respiratory Failure  - Meets sepsis criteria with leukocytosis, tachycardia, and suspected pulmonary source. Patient hypoxic to 83% on room air  - Last PET in October 2020 showed stable left apical lesion, 1.2 x 0.8 cm  - Obtained Chest CT with IV contrast, does not appear that lung squamous cell cancer is worsening  - COPD exacerbation less likely at this point  - UPDATE: Patient is COVID-19 positive  - Less likely superimposed bacterial pneumonia. However, will check procalcitonin and keep Cefepime/Azithromycin for now  - F/u blood cx  - ID consult  - Will give Dexamethasone 6 mg QD. Given patient's hepatic abnormalities, will hold off on Remdesivir for now  - Albuterol/Ipratropium, O2 via NC, supportive therapy  - Check O2 Q4H  - S/p IVFs  - Lactate normal    3/4) Squamous cell cancer of retromolar trigone/squamous cell lung cancer  - Lung nodule with Bx 07/2020 showing squamous cell lung cancer, but unclear of origin (lung vs metastasis from jaw)  - CT chest does not show obvious interval progression  - Oncology consult    5) Elevated troponin  - Troponin minimally elevated x2, likely due to demand, EKG unremarkable. NO need to monitor on telemetry    6) History of Hepatitis C  - With mild AST elevation  - No HCV treatment  - Reported history of cirrhosis  - F/u hepatic findings on chest CT  - Trend CMP  - Check HCV Ab and RNA    7) COPD  - Will give Symbicort (Breo equivalent)  - Albuterol/Ipratropium standing  - Less likely COPD exacerbation    8) Prophylactic measure  - DVT PPX: IMPROVE score of 3, will give Lovenox 40 mg SQ QD  - Diet: dysphagia 2, mechanical soft  - Dispo: pending improvement in sxs, weaning off O2    9) Advanced Care Planning/Counseling Discussion  - Discussed advanced care planning, including code status, with the patient. He stated that in the event of cardiac or pulmonary arrest, he would want CPR and/or intubation as otherwise medically indicated (FULL CODE).

## 2020-12-08 NOTE — H&P ADULT - NSHPREVIEWOFSYSTEMS_GEN_ALL_CORE
Gen: + weight loss, malaise, fatigue. Negative for weight gain or fevers  Eyes: no blurred vision or lacrimation  ENT: no tinnitus, vertigo, or decreased hearing  Resp: + dyspnea, cough, wheezing. No pleuritic chest pain or hemoptysis  CV: + MCCURDY. No chest pain or palpitations  GI: no nausea, vomiting, abdominal pain, diarrhea, constipation, melena, or hematochezia  : no dysuria, hematuria, or incontinence  MSK: no arthralgias or joint swelling  Neuro: no focal deficits, confusion, dizziness, tremors, or seizures  Skin: no rash, lesions, or edema

## 2020-12-08 NOTE — ED ADULT NURSE NOTE - CHIEF COMPLAINT QUOTE
pt presents from [place] ( head and neck ca) with c/o worsening shortness of breath, cough and weakness.

## 2020-12-08 NOTE — ED PROVIDER NOTE - NS_ ATTENDINGSCRIBEDETAILS _ED_A_ED_FT
I, Kai Sousa MD,  performed the initial face to face bedside interview with this patient regarding history of present illness, review of symptoms and relevant past medical, social and family history.  I completed an independent physical examination.    The history, relevant review of systems, past medical and surgical history, medical decision making, and physical examination was documented by the scribe in my presence and I attest to the accuracy of the documentation.

## 2020-12-08 NOTE — H&P ADULT - NSHPSOCIALHISTORY_GEN_ALL_CORE
Former smoker, 1 pack per day x 40 years, quit 8 months ago.  Former alcohol user, quit about 8 months ago.  Former occasional heroin user (not IV).

## 2020-12-09 NOTE — CONSULT NOTE ADULT - SUBJECTIVE AND OBJECTIVE BOX
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 For the past week, he has been having dyspnea, fatigue, and cough that has been gradually worsening. The cough is a yellow, mucoid cough, without blood. He denies chest pain, but endorses some wheezing. He has been laying in bed most of the past few days. He denies fevers, nausea/vomiting, abdominal pain, diarrhea, leg swelling, rash, or urinary complaints. He normally uses his Breo inhaler only as needed, but the past week he has used a at least 2-3 times.    Of note, his cancer of the left jaw was diagnosed in March 2020. He had chemotherapy from 04-06/2020. He was found to have a left lung apical lesion, which was biopsied in 07/2020 and found to be squamous cell cancer, although it was not clear its origin (primary lung vs metastasis). He had RT to the lung in 08/2020 and a PET scan in 10/2020 showed his left lung nodule to be stable at 1.2 x 0.8 cm, along with residual disease in his jaw. He states he is supposed to have a lung "procedure" later this month but he is not sure whet it is. He has not had surgery for his cancer.  he does acknowledge chronic dysphagia due to his cancer, takes Ensure with difficulty and is able to swallow liquids. he denies any worsening of this difficulty recently.  He has not had treatment for his HCV.    Diagnosed with Covid pneumonia. On     ONCOLOGY :   Diagnosed with floor of mouth  / retromolar trigone cancer March 2020. S/p definitive chemoRT completed mid June 2020 with good response on PET . ALso found to have ESTELLA pulmoanry nodule - biopsy at [facility] c/w squamous cell cancer- likely  second primary rather than solitary met. S/p stereotactic RT ( [facility] - mid September).  Last seen in our office Oct 2020.  C/o oral pain for few weeks , difficulty to open mouth, weight loss. Evaluated by ENT. PET- uptake in the tumor bed- persistent disease versus RT necrosis.  Biopsy recommended but patient reluctant. Follow up PET in 3 months ( Jan ) planned.     PAST MEDICAL & SURGICAL HISTORY:  Nodule of left lung  squamous cell cancer of unknown etiology    Squamous cell skin cancer, jawline  left retromolar trigone    GERD (gastroesophageal reflux disease)    Cirrhosis    COPD (chronic obstructive pulmonary disease)    Hepatitis C    H/O wrist surgery    History of ankle surgery      Social History:  Former smoker, 1 pack per day x 40 years, quit 8 months ago.  Former alcohol user, quit about 8 months ago.  Former occasional heroin user (not IV). (08 Dec 2020 20:49)    FAMILY HISTORY:  Family history of pancreatic cancer    Family history of diabetes mellitus (Father)    ROS : as above     Vital Signs Last 24 Hrs  T(C): 36.2 (09 Dec 2020 16:45), Max: 37.1 (08 Dec 2020 22:45)  T(F): 97.1 (09 Dec 2020 16:45), Max: 98.7 (08 Dec 2020 22:45)  HR: 65 (09 Dec 2020 16:45) (65 - 82)  BP: 109/56 (09 Dec 2020 16:45) (105/54 - 123/58)  BP(mean): 73 (08 Dec 2020 23:30) (73 - 76)  RR: 17 (09 Dec 2020 16:45) (17 - 20)  SpO2: 100% (09 Dec 2020 16:45) (93% - 100%)                          9.9    9.63  )-----------( 123      ( 09 Dec 2020 08:22 )             30.7   12-09    134<L>  |  102  |  33<H>  ----------------------------<  154<H>  4.6   |  27  |  0.83    Ca    8.4<L>      09 Dec 2020 08:22  Phos  3.5     12-09  Mg     1.9     12-09    TPro  6.4  /  Alb  2.1<L>  /  TBili  0.8  /  DBili  x   /  AST  50<H>  /  ALT  40  /  AlkPhos  61  12-09    reviewed CT chest done on admission

## 2020-12-09 NOTE — CONSULT NOTE ADULT - ASSESSMENT
64 y/o Male with h/o squamous cell cancer of the jaw, COPD, cirrhosis, chronic HCV, GERD, and left lung squamous cell cancer was admitted on 12/8 for worsening SOB. For the past week, he has been having dyspnea, fatigue, and cough that has been gradually worsening. The cough is a yellow, mucoid cough, without blood. He denies chest pain, but endorses some wheezing. He has been laying in bed most of the past few days. He denies fever at home. He has chronic dysphagia due to his cancer, takes Ensure with difficulty and is able to swallow liquids. In the ED, he was given Cefepime 1 g IV x1 and Azithromycin 500 mg IV x1.    1. Acute respiratory failure. COVID-19 viral syndrome. Multifocal pneumonia.  Lung Ca s/p XRT.   -has several risk factors for possible severe COVID disease  -concerned about superimposed underlying bacterial pneumonia in [place] of history of lung malignancy  -obtain BC x 2, sputum c/s  -agree with cefepime 2 gm IV q12h and azithromycin 500 mg IV qd   -reason for abx use and side effects reviewed with patient; monitor BMP   -respiratory care  -droplet isolation  -steroids  -AC  -hypoxia with O2 sat 96% on 4l/min by NC; would hold of remdesivir at this time  -monitor respiratory function  -O2 therapy  -old chart reviewed to assess prior cultures  -monitor temps  -f/u CBC  -supportive care  2. Other issues:   -care per medicine

## 2020-12-09 NOTE — PROGRESS NOTE ADULT - SUBJECTIVE AND OBJECTIVE BOX
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 For the past week, he has been having dyspnea, fatigue, and cough that has been gradually worsening. The cough is a yellow, mucoid cough, without blood. He denies chest pain, but endorses some wheezing. He has been laying in bed most of the past few days. He denies fevers, nausea/vomiting, abdominal pain, diarrhea, leg swelling, rash, or urinary complaints. He normally uses his Breo inhaler only as needed, but the past week he has used a at least 2-3 times.    Of note, his cancer of the left jaw was diagnosed in March 2020. He had chemotherapy from 04-06/2020. He was found to have a left lung apical lesion, which was biopsied in 07/2020 and found to be squamous cell cancer, although it was not clear its origin (primary lung vs metastasis). He had RT to the lung in 08/2020 and a PET scan in 10/2020 showed his left lung nodule to be stable at 1.2 x 0.8 cm, along with residual disease in his jaw. He states he is supposed to have a lung "procedure" later this month but he is not sure whet it is. He has not had surgery for his cancer.  he does acknowledge chronic dysphagia due to his cancer, takes Ensure with difficulty and is able to swallow liquids. he denies any worsening of this difficulty recently.  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    12/9 - no cp palps; sob cough are better    PHYSICAL EXAM:  GENERAL: NAD, able to lie flat in bed  HEAD:  Atraumatic, Normocephalic  EYES: EOMI, PERRLA, normal sclera  ENT: Moist mucous membranes  NECK: Supple, No JVD, no nuchal rigidity  CHEST/LUNG: few rhonchi. Unlabored respirations  HEART: Regular rate and rhythm; No murmurs, rubs, or gallops  ABDOMEN: Bowel sounds present; Soft, Nontender, Nondistended. No hepatomegaly  EXTREMITIES:  no pitting bilaterally  NERVOUS SYSTEM:  Alert & Oriented X3, speech clear. No focal motor or sensory deficits  MSK: FROM all 4 extremities, full and equal strength  SKIN: No rashes or lesions    LABS: All Labs Reviewed:                        9.9    9.63  )-----------( 123      ( 09 Dec 2020 08:22 )             30.7     134<L>  |  102  |  33<H>  ----------------------------<  154<H>  4.6   |  27  |  0.83  TPro  6.4  /  Alb  2.1<L>  /  TBili  0.8  /  DBili  x   /  AST  50<H>  /  ALT  40  /  AlkPhos  61  12-09  PT/INR - ( 08 Dec 2020 17:31 )   PT: 14.1 sec;   INR: 1.23 ratio          RADIOLOGY/EKG:  < from: CT Chest w/ IV Cont (12.08.20 @ 21:23) >  Interval progression and new areas of infection in left upper lobe, right middle lobe and both lower lobes.  No interval change of spiculated 8 mm sized left upper lobe nodule.  Severe centrilobular and paraseptal emphysema.        acetaminophen   Tablet .. 650 milliGRAM(s) Oral every 6 hours PRN  ALBUTerol    90 MICROgram(s) HFA Inhaler 2 Puff(s) Inhalation every 6 hours  azithromycin  IVPB 500 milliGRAM(s) IV Intermittent every 24 hours  benzonatate 100 milliGRAM(s) Oral three times a day PRN  budesonide  80 MICROgram(s)/formoterol 4.5 MICROgram(s) Inhaler 2 Puff(s) Inhalation two times a day  cefepime   IVPB 2000 milliGRAM(s) IV Intermittent every 12 hours  dexAMETHasone  Injectable 6 milliGRAM(s) IV Push daily  enoxaparin Injectable 40 milliGRAM(s) SubCutaneous daily  ipratropium 17 MICROgram(s) HFA Inhaler 1 Puff(s) Inhalation every 6 hours  ondansetron Injectable 4 milliGRAM(s) IV Push every 6 hours PRN

## 2020-12-09 NOTE — PROGRESS NOTE ADULT - ASSESSMENT
Pneumonia due to COVID-19 virus/Sepsis with Acute Hypoxic Respiratory Failure  - Meets sepsis criteria with leukocytosis, tachycardia, and suspected pulmonary source. Patient hypoxic to 83% on room air  - Superimposed bacterial pneumonia; agree with  Cefepime/Azithromycin  - Cont Dexamethasone 6 mg QD. Given patient's hepatic abnormalities, will hold off on Remdesivir for now  - Albuterol/Ipratropium, O2 via NC, supportive therapy  - Check O2 Q4H  - S/p IVFs  - Lactate normal    Squamous cell cancer of retromolar trigone/squamous cell lung cancer  - Lung nodule with Bx 07/2020 showing squamous cell lung cancer, but unclear of origin (lung vs metastasis from jaw)  - CT chest does not show obvious interval progression  - Oncology consult    Elevated troponin  - Troponin minimally elevated x2, likely due to demand, EKG unremarkable. NO need to monitor on telemetry6) History of Hepatitis C  - With mild AST elevation  - No HCV treatment  - Reported history of cirrhosis  - F/u hepatic findings on chest CT  - Trend CMP  - Check HCV Ab and RNA    COPD  - Will give Symbicort (Breo equivalent)  - Albuterol/Ipratropium standing  - stable, not on home O2    Prophylactic measure  - DVT PPX: IMPROVE score of 3, will give Lovenox 40 mg SQ QD  - Diet: dysphagia 2, mechanical soft  - Dispo: pending improvement in sxs, weaning off O2    Advanced Care Planning/Counseling Discussion- Discussed advanced care planning, including code status, with the patient.   He stated that in the event of cardiac or pulmonary arrest, he would want CPR and/or intubation as otherwise medically indicated (FULL CODE)    discussed with RN and wife Myah

## 2020-12-09 NOTE — CONSULT NOTE ADULT - ASSESSMENT
64 y/o male  former smoker with clinical stage T4b, N1 squamous cancer of left retromolar trigone and  T1 squamous cell cancer of Left lung versus solitary met  s/p definitive chemoRT to  his oral cancer completed June 2020  ( done at [facility]) and s/p stereotactic RT to left lung tumor ( [facility] [place]  August 2020). recent follow up scans showed persistent uptake in  area- of primary tumor in the  mouth area :  residual disease versus radiation necrosis. Biopsy versus follow up PET was planned. Lung cancer- s/p RT- plan was to evaluate response with PET in January.  Now admitted with Covid pneumonia. High risk patient due to COPD/ comorbidities.  Management  per medicine/ ID.   Will re-evaluate status of his cancer  after he recovers from Covid infection.

## 2020-12-09 NOTE — CONSULT NOTE ADULT - SUBJECTIVE AND OBJECTIVE BOX
Patient is a 65y old  Male who presents with a chief complaint of dyspnea, ?pneumonia (08 Dec 2020 20:49)    HPI:  64 y/o Male with h/o squamous cell cancer of the jaw, COPD, cirrhosis, chronic HCV, GERD, and left lung squamous cell cancer was admitted on  for worsening SOB. For the past week, he has been having dyspnea, fatigue, and cough that has been gradually worsening. The cough is a yellow, mucoid cough, without blood. He denies chest pain, but endorses some wheezing. He has been laying in bed most of the past few days. He denies fever at home. He has chronic dysphagia due to his cancer, takes Ensure with difficulty and is able to swallow liquids. In the ED, he was given Cefepime 1 g IV x1 and Azithromycin 500 mg IV x1.      PMH: as above  Of note, his cancer of the left jaw was diagnosed in 2020. He had chemotherapy from -2020. He was found to have a left lung apical lesion, which was biopsied in 2020 and found to be squamous cell cancer, although it was not clear its origin (primary lung vs metastasis). He had RT to the lung in 2020 and a PET scan in 10/2020 showed his left lung nodule to be stable at 1.2 x 0.8 cm, along with residual disease in his jaw. He states he is supposed to have a lung "procedure" later this month but he is not sure whet it is. He has not had surgery for his cancer.  He has not had treatment for his HCV.  PSH: as above  Meds: per reconciliation sheet, noted below  MEDICATIONS  (STANDING):  ALBUTerol    90 MICROgram(s) HFA Inhaler 2 Puff(s) Inhalation every 6 hours  azithromycin  IVPB 500 milliGRAM(s) IV Intermittent every 24 hours  budesonide  80 MICROgram(s)/formoterol 4.5 MICROgram(s) Inhaler 2 Puff(s) Inhalation two times a day  cefepime   IVPB 2000 milliGRAM(s) IV Intermittent every 8 hours  dexAMETHasone  Injectable 6 milliGRAM(s) IV Push daily  enoxaparin Injectable 40 milliGRAM(s) SubCutaneous daily  ipratropium 17 MICROgram(s) HFA Inhaler 1 Puff(s) Inhalation every 6 hours    MEDICATIONS  (PRN):  acetaminophen   Tablet .. 650 milliGRAM(s) Oral every 6 hours PRN Temp greater or equal to 38C (100.4F), Mild Pain (1 - 3)  benzonatate 100 milliGRAM(s) Oral three times a day PRN Cough  ondansetron Injectable 4 milliGRAM(s) IV Push every 6 hours PRN Nausea and/or Vomiting    Allergies    No Known Allergies    Intolerances      Social: no smoking, no alcohol, no illegal drugs; no recent travel, no exposure to TB  FAMILY HISTORY:  Family history of pancreatic cancer    Family history of diabetes mellitus (Father)      no history of premature cardiovascular disease in first degree relatives    ROS: the patient denies fever, no chills, no HA, no seizures, no dizziness, no sore throat, no nasal congestion, no blurry vision, no CP, no palpitations, has SOB, has cough, no abdominal pain, no diarrhea, no N/V, no dysuria, no leg pain, no claudication, no rash, no joint aches, no rectal pain or bleeding, no night sweats; has increased weakness  All other systems reviewed and are negative    Vital Signs Last 24 Hrs  T(C): 36.3 (09 Dec 2020 09:05), Max: 37.1 (08 Dec 2020 22:45)  T(F): 97.4 (09 Dec 2020 09:05), Max: 98.7 (08 Dec 2020 22:45)  HR: 65 (09 Dec 2020 09:05) (65 - 97)  BP: 105/54 (09 Dec 2020 09:05) (100/61 - 123/58)  BP(mean): 73 (08 Dec 2020 23:30) (73 - 76)  RR: 18 (09 Dec 2020 09:05) (17 - 24)  SpO2: 93% (09 Dec 2020 09:05) (83% - 100%)  Daily Height in cm: 175.26 (08 Dec 2020 17:21)    Daily     PE:    Constitutional:  No acute distress  HEENT: NC/AT, EOMI, PERRLA, conjunctivae clear; ears and nose atraumatic; pharynx benign  Neck: supple; thyroid not palpable  Back: no tenderness  Respiratory: respiratory effort normal; crackles b/l  Cardiovascular: S1S2 regular, no murmurs  Abdomen: soft, not tender, not distended, positive BS; no liver or spleen organomegaly  Genitourinary: no suprapubic tenderness  Lymphatic: no LN palpable  Musculoskeletal: no muscle tenderness, no joint swelling or tenderness  Extremities: no pedal edema  Neurological/ Psychiatric: AxOx3, judgement and insight normal; moving all extremities  Skin: no rashes; no palpable lesions    Labs: all available labs reviewed                        9.9    9.63  )-----------( 123      ( 09 Dec 2020 08:22 )             30.7         134<L>  |  102  |  33<H>  ----------------------------<  154<H>  4.6   |  27  |  0.83    Ca    8.4<L>      09 Dec 2020 08:22  Phos  3.5       Mg     1.9         TPro  6.4  /  Alb  2.1<L>  /  TBili  0.8  /  DBili  x   /  AST  50<H>  /  ALT  40  /  AlkPhos  61       LIVER FUNCTIONS - ( 09 Dec 2020 08:22 )  Alb: 2.1 g/dL / Pro: 6.4 gm/dL / ALK PHOS: 61 U/L / ALT: 40 U/L / AST: 50 U/L / GGT: x           Urinalysis Basic - ( 08 Dec 2020 19:14 )    Color: Yellow / Appearance: Clear / S.020 / pH: x  Gluc: x / Ketone: Negative  / Bili: Negative / Urobili: Negative mg/dL   Blood: x / Protein: 15 mg/dL / Nitrite: Negative   Leuk Esterase: Negative / RBC: 0-2 /HPF / WBC 0-2   Sq Epi: x / Non Sq Epi: Occasional / Bacteria: Occasional        COVID-19 PCR: Detected (20 @ 17:31)    Radiology: all available radiological tests reviewed    < from: CT Chest w/ IV Cont (20 @ 21:23) >  Interval progression and new areas of infection in left upper lobe, right middle lobe and both lower lobes.  No interval change of spiculated 8 mm sized left upper lobe nodule.  Severe centrilobular and paraseptal emphysema.    < end of copied text >      Advanced directives addressed: full resuscitation

## 2020-12-10 NOTE — CHART NOTE - NSCHARTNOTEFT_GEN_A_CORE
HOME O2 EVALUATION    Pulse Ox Room Air Rest:86    Pulse Ox Room Air Ambulating:    Pulse Ox on O2          L Ambulating:    Pulse Ox Post Ambulation: 95 on 3 lpm at rest

## 2020-12-10 NOTE — DIETITIAN INITIAL EVALUATION ADULT. - ETIOLOGY
Inadequate protein/energy intake with increased protein/energy needs 2/2 chemotherapy treatment for Jaw/lung cancer

## 2020-12-10 NOTE — PROGRESS NOTE ADULT - SUBJECTIVE AND OBJECTIVE BOX
Date of service: 12-10-20 @ 12:44    Lying in bed in NAD  Weak looking  No SOB at rest   Has dry cough  On O2 by NC    ROS: no fever or chills; denies dizziness, no HA, no abdominal pain, no diarrhea or constipation; no dysuria, no legs pain, no rashes    MEDICATIONS  (STANDING):  azithromycin  IVPB 500 milliGRAM(s) IV Intermittent every 24 hours  budesonide  80 MICROgram(s)/formoterol 4.5 MICROgram(s) Inhaler 2 Puff(s) Inhalation two times a day  cefepime   IVPB 2000 milliGRAM(s) IV Intermittent every 12 hours  dexAMETHasone  Injectable 6 milliGRAM(s) IV Push daily  enoxaparin Injectable 40 milliGRAM(s) SubCutaneous daily  ipratropium 17 MICROgram(s) HFA Inhaler 1 Puff(s) Inhalation every 6 hours    Vital Signs Last 24 Hrs  T(C): 36.1 (10 Dec 2020 09:33), Max: 36.4 (09 Dec 2020 21:30)  T(F): 97 (10 Dec 2020 09:33), Max: 97.5 (09 Dec 2020 21:30)  HR: 71 (10 Dec 2020 09:33) (65 - 82)  BP: 95/50 (10 Dec 2020 09:33) (95/50 - 109/56)  BP(mean): --  RR: 18 (10 Dec 2020 09:33) (17 - 18)  SpO2: 95% (10 Dec 2020 09:33) (94% - 100%)     Physical exam:    Constitutional:  No acute distress  HEENT: NC/AT, EOMI, PERRLA, conjunctivae clear  Neck: supple; thyroid not palpable  Back: no tenderness  Respiratory: respiratory effort normal; crackles b/l  Cardiovascular: S1S2 regular, no murmurs  Abdomen: soft, not tender, not distended, positive BS  Genitourinary: no suprapubic tenderness  Lymphatic: no LN palpable  Musculoskeletal: no muscle tenderness, no joint swelling or tenderness  Extremities: no pedal edema  Neurological/ Psychiatric: AxOx3, moving all extremities  Skin: no rashes; no palpable lesions    Labs: reviewed                        9.6    15.23 )-----------( 143      ( 10 Dec 2020 07:15 )             28.4     12-10    136  |  103  |  32<H>  ----------------------------<  111<H>  4.1   |  25  |  0.84    Ca    8.2<L>      10 Dec 2020 07:15  Phos  3.5     12-09  Mg     2.0     12-10    TPro  6.4  /  Alb  2.1<L>  /  TBili  0.8  /  DBili  x   /  AST  50<H>  /  ALT  40  /  AlkPhos  61  12-    C-Reactive Protein, Serum: 4.88 mg/dL (20 @ 08:22)  D-Dimer Assay, Quantitative: 946 ng/mL DDU (20 @ 08:22)  Ferritin, Serum: 923 ng/mL (20 @ 08:22)                        9.9    9.63  )-----------( 123      ( 09 Dec 2020 08:22 )             30.7     12-09    134<L>  |  102  |  33<H>  ----------------------------<  154<H>  4.6   |  27  |  0.83    Ca    8.4<L>      09 Dec 2020 08:22  Phos  3.5     12-09  Mg     1.9     12-    TPro  6.4  /  Alb  2.1<L>  /  TBili  0.8  /  DBili  x   /  AST  50<H>  /  ALT  40  /  AlkPhos  61  12-09     LIVER FUNCTIONS - ( 09 Dec 2020 08:22 )  Alb: 2.1 g/dL / Pro: 6.4 gm/dL / ALK PHOS: 61 U/L / ALT: 40 U/L / AST: 50 U/L / GGT: x           Urinalysis Basic - ( 08 Dec 2020 19:14 )    Color: Yellow / Appearance: Clear / S.020 / pH: x  Gluc: x / Ketone: Negative  / Bili: Negative / Urobili: Negative mg/dL   Blood: x / Protein: 15 mg/dL / Nitrite: Negative   Leuk Esterase: Negative / RBC: 0-2 /HPF / WBC 0-2   Sq Epi: x / Non Sq Epi: Occasional / Bacteria: Occasional      Culture - Urine (collected 08 Dec 2020 19:14)  Source: .Urine Clean Catch (Midstream)  Final Report (09 Dec 2020 21:16):    No growth    Culture - Blood (collected 08 Dec 2020 17:31)  Source: .Blood Blood-Peripheral  Preliminary Report (10 Dec 2020 01:02):    No growth to date.    Culture - Blood (collected 08 Dec 2020 17:31)  Source: .Blood Blood-Peripheral  Preliminary Report (10 Dec 2020 01:02):    No growth to date.    COVID-19 PCR: Detected (20 @ 17:31)    Radiology: all available radiological tests reviewed    < from: CT Chest w/ IV Cont (20 @ 21:23) >  Interval progression and new areas of infection in left upper lobe, right middle lobe and both lower lobes.  No interval change of spiculated 8 mm sized left upper lobe nodule.  Severe centrilobular and paraseptal emphysema.    < end of copied text >      Advanced directives addressed: full resuscitation Date of service: 12-10-20 @ 12:44    Lying in bed in NAD  Weak looking  No SOB at rest  SOB with light exercise  Has dry cough  On O2 by NC  Anxious    ROS: no fever or chills; denies dizziness, no HA, no abdominal pain, no diarrhea or constipation; no dysuria, no legs pain, no rashes    MEDICATIONS  (STANDING):  azithromycin  IVPB 500 milliGRAM(s) IV Intermittent every 24 hours  budesonide  80 MICROgram(s)/formoterol 4.5 MICROgram(s) Inhaler 2 Puff(s) Inhalation two times a day  cefepime   IVPB 2000 milliGRAM(s) IV Intermittent every 12 hours  dexAMETHasone  Injectable 6 milliGRAM(s) IV Push daily  enoxaparin Injectable 40 milliGRAM(s) SubCutaneous daily  ipratropium 17 MICROgram(s) HFA Inhaler 1 Puff(s) Inhalation every 6 hours    Vital Signs Last 24 Hrs  T(C): 36.1 (10 Dec 2020 09:33), Max: 36.4 (09 Dec 2020 21:30)  T(F): 97 (10 Dec 2020 09:33), Max: 97.5 (09 Dec 2020 21:30)  HR: 71 (10 Dec 2020 09:33) (65 - 82)  BP: 95/50 (10 Dec 2020 09:33) (95/50 - 109/56)  BP(mean): --  RR: 18 (10 Dec 2020 09:33) (17 - 18)  SpO2: 95% (10 Dec 2020 09:33) (94% - 100%)     Physical exam:    Constitutional:  No acute distress  HEENT: NC/AT, EOMI, PERRLA, conjunctivae clear  Neck: supple; thyroid not palpable  Back: no tenderness  Respiratory: respiratory effort normal; crackles b/l  Cardiovascular: S1S2 regular, no murmurs  Abdomen: soft, not tender, not distended, positive BS  Genitourinary: no suprapubic tenderness  Lymphatic: no LN palpable  Musculoskeletal: no muscle tenderness, no joint swelling or tenderness  Extremities: no pedal edema  Neurological/ Psychiatric: AxOx3, moving all extremities  Skin: no rashes; no palpable lesions    Labs: reviewed                        9.6    15.23 )-----------( 143      ( 10 Dec 2020 07:15 )             28.4     12-10    136  |  103  |  32<H>  ----------------------------<  111<H>  4.1   |  25  |  0.84    Ca    8.2<L>      10 Dec 2020 07:15  Phos  3.5     12-09  Mg     2.0     12-10    TPro  6.4  /  Alb  2.1<L>  /  TBili  0.8  /  DBili  x   /  AST  50<H>  /  ALT  40  /  AlkPhos  61  12    C-Reactive Protein, Serum: 4.88 mg/dL (20 @ 08:22)  D-Dimer Assay, Quantitative: 946 ng/mL DDU (20 @ 08:22)  Ferritin, Serum: 923 ng/mL (20 @ 08:22)                        9.9    9.63  )-----------( 123      ( 09 Dec 2020 08:22 )             30.7     12-09    134<L>  |  102  |  33<H>  ----------------------------<  154<H>  4.6   |  27  |  0.83    Ca    8.4<L>      09 Dec 2020 08:22  Phos  3.5     12-09  Mg     1.9     -    TPro  6.4  /  Alb  2.1<L>  /  TBili  0.8  /  DBili  x   /  AST  50<H>  /  ALT  40  /  AlkPhos  61  12-09     LIVER FUNCTIONS - ( 09 Dec 2020 08:22 )  Alb: 2.1 g/dL / Pro: 6.4 gm/dL / ALK PHOS: 61 U/L / ALT: 40 U/L / AST: 50 U/L / GGT: x           Urinalysis Basic - ( 08 Dec 2020 19:14 )    Color: Yellow / Appearance: Clear / S.020 / pH: x  Gluc: x / Ketone: Negative  / Bili: Negative / Urobili: Negative mg/dL   Blood: x / Protein: 15 mg/dL / Nitrite: Negative   Leuk Esterase: Negative / RBC: 0-2 /HPF / WBC 0-2   Sq Epi: x / Non Sq Epi: Occasional / Bacteria: Occasional      Culture - Urine (collected 08 Dec 2020 19:14)  Source: .Urine Clean Catch (Midstream)  Final Report (09 Dec 2020 21:16):    No growth    Culture - Blood (collected 08 Dec 2020 17:31)  Source: .Blood Blood-Peripheral  Preliminary Report (10 Dec 2020 01:02):    No growth to date.    Culture - Blood (collected 08 Dec 2020 17:31)  Source: .Blood Blood-Peripheral  Preliminary Report (10 Dec 2020 01:02):    No growth to date.    COVID-19 PCR: Detected (20 @ 17:31)    Radiology: all available radiological tests reviewed    < from: CT Chest w/ IV Cont (20 @ 21:23) >  Interval progression and new areas of infection in left upper lobe, right middle lobe and both lower lobes.  No interval change of spiculated 8 mm sized left upper lobe nodule.  Severe centrilobular and paraseptal emphysema.    < end of copied text >      Advanced directives addressed: full resuscitation

## 2020-12-10 NOTE — CHART NOTE - NSCHARTNOTEFT_GEN_A_CORE
Nutrition Diagnostic Terminology #1 Inadequate Oral Intake Underweight Unintended Weight Loss.     Etiology Inadequate protein/energy intake with increased protein/energy needs 2/2 chemotherapy treatment for Jaw/lung cancer.     Signs/Symptoms poor appetite and minimal oral intake and severe unintended wt loss over past 6 months. BMI 17.5.

## 2020-12-10 NOTE — CHART NOTE - FINDINGS AS BASED ON:
Follow up/Food acceptance and intake status from observations by staff/Comprehensive nutrition assessment and consultation

## 2020-12-10 NOTE — CHART NOTE - TREATMENT: THE FOLLOWING DIET HAS BEEN RECOMMENDED
Diet, Dysphagia 2 Mechanical Soft-Thin Liquids:   Supplement Feeding Modality:  Oral  Ensure Clear Cans or Servings Per Day:  2       Frequency:  Two Times a day (12-08-20 @ 20:52) [Active]

## 2020-12-10 NOTE — DIETITIAN INITIAL EVALUATION ADULT. - MALNUTRITION
Severe malnutrition in context of chronic illness. Poor intake (<75% for >3 mo) and severe unintended wt loss (15.7% over past 6 mo)

## 2020-12-10 NOTE — PROGRESS NOTE ADULT - SUBJECTIVE AND OBJECTIVE BOX
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 For the past week, he has been having dyspnea, fatigue, and cough that has been gradually worsening. The cough is a yellow, mucoid cough, without blood. He denies chest pain, but endorses some wheezing. He has been laying in bed most of the past few days. He denies fevers, nausea/vomiting, abdominal pain, diarrhea, leg swelling, rash, or urinary complaints. He normally uses his Breo inhaler only as needed, but the past week he has used a at least 2-3 times.    Of note, his cancer of the left jaw was diagnosed in 2020. He had chemotherapy from -2020. He was found to have a left lung apical lesion, which was biopsied in 2020 and found to be squamous cell cancer, although it was not clear its origin (primary lung vs metastasis). He had RT to the lung in 2020 and a PET scan in 10/2020 showed his left lung nodule to be stable at 1.2 x 0.8 cm, along with residual disease in his jaw. He states he is supposed to have a lung "procedure" later this month but he is not sure whet it is. He has not had surgery for his cancer.  he does acknowledge chronic dysphagia due to his cancer, takes Ensure with difficulty and is able to swallow liquids. he denies any worsening of this difficulty recently.  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    12/10: Hemodynamically stable. Denies any HA, CP, SOB. Comfortable. Will discuss antibiotics with ID.     PHYSICAL EXAM:  GENERAL: NAD, able to lie flat in bed  HEAD:  Atraumatic, Normocephalic  EYES: EOMI, PERRLA, normal sclera  ENT: Moist mucous membranes  NECK: Supple, No JVD, no nuchal rigidity  CHEST/LUNG: few rhonchi. Unlabored respirations  HEART: Regular rate and rhythm; No murmurs, rubs, or gallops  ABDOMEN: Bowel sounds present; Soft, Nontender, Nondistended. No hepatomegaly  EXTREMITIES:  no pitting bilaterally  NERVOUS SYSTEM:  Alert & Oriented X3, speech clear. No focal motor or sensory deficits  MSK: FROM all 4 extremities, full and equal strength  SKIN: No rashes or lesions    LABS:                 CBC Full  -  ( 10 Dec 2020 07:15 )  WBC Count : 15.23 K/uL  RBC Count : 2.92 M/uL  Hemoglobin : 9.6 g/dL  Hematocrit : 28.4 %  Platelet Count - Automated : 143 K/uL  Mean Cell Volume : 97.3 fl  Mean Cell Hemoglobin : 32.9 pg  Mean Cell Hemoglobin Concentration : 33.8 gm/dL  Auto Neutrophil # : 14.05 K/uL  Auto Lymphocyte # : 0.53 K/uL  Auto Monocyte # : 0.48 K/uL  Auto Eosinophil # : 0.01 K/uL  Auto Basophil # : 0.02 K/uL  Auto Neutrophil % : 92.2 %  Auto Lymphocyte % : 3.5 %  Auto Monocyte % : 3.2 %  Auto Eosinophil % : 0.1 %  Auto Basophil % : 0.1 %    PT/INR - ( 08 Dec 2020 17:31 )   PT: 14.1 sec;   INR: 1.23 ratio         PTT - ( 08 Dec 2020 17:31 )  PTT:26.0 sec  Urinalysis Basic - ( 08 Dec 2020 19:14 )    Color: Yellow / Appearance: Clear / S.020 / pH: x  Gluc: x / Ketone: Negative  / Bili: Negative / Urobili: Negative mg/dL   Blood: x / Protein: 15 mg/dL / Nitrite: Negative   Leuk Esterase: Negative / RBC: 0-2 /HPF / WBC 0-2   Sq Epi: x / Non Sq Epi: Occasional / Bacteria: Occasional      12-10    136  |  103  |  32<H>  ----------------------------<  111<H>  4.1   |  25  |  0.84    Ca    8.2<L>      10 Dec 2020 07:15  Phos  3.5     12-09  Mg     2.0     12-10    TPro  6.4  /  Alb  2.1<L>  /  TBili  0.8  /  DBili  x   /  AST  50<H>  /  ALT  40  /  AlkPhos  61  12-09     # Pneumonia due to COVID-19 virus/Sepsis with Acute Hypoxic Respiratory Failure  - Meets sepsis criteria with leukocytosis, tachycardia, and suspected pulmonary source. Patient hypoxic to 83% on room air  - Superimposed bacterial pneumonia; agree with  Cefepime/Azithromycin  - Cont Dexamethasone 6 mg QD. 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 Biopsy versus follow up PET was planned. Lung cancer- s/p RT- plan was to evaluate response with PET in January.  - oncology follow up     # Elevated troponin  - Troponin minimally elevated x2, likely due to demand, EKG unremarkable. NO need to monitor on telemetry6) History of Hepatitis C  - With mild AST elevation  - No HCV treatment  - Reported history of cirrhosis  - F/u hepatic findings on chest CT  - Trend CMP  - Check HCV Ab and RNA    # COPD  - Will give Symbicort (Breo equivalent)  - Albuterol/Ipratropium standing  - stable, not on home O2    # Prophylactic measure  - DVT PPX: IMPROVE score of 3, will give Lovenox 40 mg SQ QD  - Diet: dysphagia 2, mechanical soft  - Dispo: pending improvement in sxs, weaning off O2    Advanced Care Planning/Counseling Discussion- Discussed advanced care planning, including code status, with the patient.   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 For the past week, he has been having dyspnea, fatigue, and cough that has been gradually worsening. The cough is a yellow, mucoid cough, without blood. He denies chest pain, but endorses some wheezing. He has been laying in bed most of the past few days. He denies fevers, nausea/vomiting, abdominal pain, diarrhea, leg swelling, rash, or urinary complaints. He normally uses his Breo inhaler only as needed, but the past week he has used a at least 2-3 times.    Of note, his cancer of the left jaw was diagnosed in 2020. He had chemotherapy from -2020. He was found to have a left lung apical lesion, which was biopsied in 2020 and found to be squamous cell cancer, although it was not clear its origin (primary lung vs metastasis). He had RT to the lung in 2020 and a PET scan in 10/2020 showed his left lung nodule to be stable at 1.2 x 0.8 cm, along with residual disease in his jaw. He states he is supposed to have a lung "procedure" later this month but he is not sure whet it is. He has not had surgery for his cancer.  he does acknowledge chronic dysphagia due to his cancer, takes Ensure with difficulty and is able to swallow liquids. he denies any worsening of this difficulty recently.  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    12/10: Hemodynamically stable. Denies any HA, CP, SOB. Comfortable. Will discuss antibiotics with ID. Patient notes of feeling much better than last week. Labs and vitals reviewed.     PHYSICAL EXAM:  GENERAL: NAD, able to lie flat in bed  HEAD:  Atraumatic, Normocephalic  EYES: EOMI, PERRLA, normal sclera  ENT: Moist mucous membranes  NECK: Supple, No JVD, no nuchal rigidity  CHEST/LUNG: few rhonchi. Unlabored respirations  HEART: Regular rate and rhythm; No murmurs, rubs, or gallops  ABDOMEN: Bowel sounds present; Soft, Nontender, Nondistended. No hepatomegaly  EXTREMITIES:  no pitting bilaterally  NERVOUS SYSTEM:  Alert & Oriented X3, speech clear. No focal motor or sensory deficits  MSK: FROM all 4 extremities, full and equal strength  SKIN: No rashes or lesions    LABS:                 CBC Full  -  ( 10 Dec 2020 07:15 )  WBC Count : 15.23 K/uL  RBC Count : 2.92 M/uL  Hemoglobin : 9.6 g/dL  Hematocrit : 28.4 %  Platelet Count - Automated : 143 K/uL  Mean Cell Volume : 97.3 fl  Mean Cell Hemoglobin : 32.9 pg  Mean Cell Hemoglobin Concentration : 33.8 gm/dL  Auto Neutrophil # : 14.05 K/uL  Auto Lymphocyte # : 0.53 K/uL  Auto Monocyte # : 0.48 K/uL  Auto Eosinophil # : 0.01 K/uL  Auto Basophil # : 0.02 K/uL  Auto Neutrophil % : 92.2 %  Auto Lymphocyte % : 3.5 %  Auto Monocyte % : 3.2 %  Auto Eosinophil % : 0.1 %  Auto Basophil % : 0.1 %    PT/INR - ( 08 Dec 2020 17:31 )   PT: 14.1 sec;   INR: 1.23 ratio         PTT - ( 08 Dec 2020 17:31 )  PTT:26.0 sec  Urinalysis Basic - ( 08 Dec 2020 19:14 )    Color: Yellow / Appearance: Clear / S.020 / pH: x  Gluc: x / Ketone: Negative  / Bili: Negative / Urobili: Negative mg/dL   Blood: x / Protein: 15 mg/dL / Nitrite: Negative   Leuk Esterase: Negative / RBC: 0-2 /HPF / WBC 0-2   Sq Epi: x / Non Sq Epi: Occasional / Bacteria: Occasional      12-10    136  |  103  |  32<H>  ----------------------------<  111<H>  4.1   |  25  |  0.84    Ca    8.2<L>      10 Dec 2020 07:15  Phos  3.5     12-09  Mg     2.0     12-10    TPro  6.4  /  Alb  2.1<L>  /  TBili  0.8  /  DBili  x   /  AST  50<H>  /  ALT  40  /  AlkPhos  61  12-09     # Pneumonia due to COVID-19 virus/Sepsis with Acute Hypoxic Respiratory Failure  - Meets sepsis criteria with leukocytosis, tachycardia, and suspected pulmonary source. Patient hypoxic to 83% on room air  - Superimposed bacterial pneumonia; agree with  Cefepime/Azithromycin  - Cont Dexamethasone 6 mg QD. 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 Biopsy versus follow up PET was planned. Lung cancer- s/p RT- plan was to evaluate response with PET in January.  - oncology follow up     # Elevated troponin  - Troponin minimally elevated x2, likely due to demand, EKG unremarkable. NO need to monitor on telemetry6) History of Hepatitis C  - With mild AST elevation  - No HCV treatment  - Reported history of cirrhosis  - F/u hepatic findings on chest CT  - Trend CMP  - Check HCV Ab and RNA    # COPD  - Will give Symbicort (Breo equivalent)  - Albuterol/Ipratropium standing  - stable, not on home O2    # Prophylactic measure  - DVT PPX: IMPROVE score of 3, will give Lovenox 40 mg SQ QD  - Diet: dysphagia 2, mechanical soft  - Dispo: pending improvement in sxs, weaning off O2    Advanced Care Planning/Counseling Discussion- Discussed advanced care planning, including code status, with the patient.   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 For the past week, he has been having dyspnea, fatigue, and cough that has been gradually worsening. The cough is a yellow, mucoid cough, without blood. He denies chest pain, but endorses some wheezing. He has been laying in bed most of the past few days. He denies fevers, nausea/vomiting, abdominal pain, diarrhea, leg swelling, rash, or urinary complaints. He normally uses his Breo inhaler only as needed, but the past week he has used a at least 2-3 times.    Of note, his cancer of the left jaw was diagnosed in 2020. He had chemotherapy from -2020. He was found to have a left lung apical lesion, which was biopsied in 2020 and found to be squamous cell cancer, although it was not clear its origin (primary lung vs metastasis). He had RT to the lung in 2020 and a PET scan in 10/2020 showed his left lung nodule to be stable at 1.2 x 0.8 cm, along with residual disease in his jaw. He states he is supposed to have a lung "procedure" later this month but he is not sure whet it is. He has not had surgery for his cancer.  he does acknowledge chronic dysphagia due to his cancer, takes Ensure with difficulty and is able to swallow liquids. he denies any worsening of this difficulty recently.  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    12/10: Hemodynamically stable. Denies any HA, CP, SOB. Comfortable. Will discuss antibiotics with ID. Patient notes of feeling much better than last week. Labs and vitals reviewed.     PHYSICAL EXAM:  GENERAL: NAD, able to lie flat in bed  HEAD:  Atraumatic, Normocephalic  EYES: EOMI, PERRLA, normal sclera  ENT: Moist mucous membranes  NECK: Supple, No JVD, no nuchal rigidity  CHEST/LUNG: few rhonchi. Unlabored respirations  HEART: Regular rate and rhythm; No murmurs, rubs, or gallops  ABDOMEN: Bowel sounds present; Soft, Nontender, Nondistended. No hepatomegaly  EXTREMITIES:  no pitting bilaterally  NERVOUS SYSTEM:  Alert & Oriented X3, speech clear. No focal motor or sensory deficits  MSK: FROM all 4 extremities, full and equal strength  SKIN: No rashes or lesions    LABS:                 CBC Full  -  ( 10 Dec 2020 07:15 )  WBC Count : 15.23 K/uL  RBC Count : 2.92 M/uL  Hemoglobin : 9.6 g/dL  Hematocrit : 28.4 %  Platelet Count - Automated : 143 K/uL  Mean Cell Volume : 97.3 fl  Mean Cell Hemoglobin : 32.9 pg  Mean Cell Hemoglobin Concentration : 33.8 gm/dL  Auto Neutrophil # : 14.05 K/uL  Auto Lymphocyte # : 0.53 K/uL  Auto Monocyte # : 0.48 K/uL  Auto Eosinophil # : 0.01 K/uL  Auto Basophil # : 0.02 K/uL  Auto Neutrophil % : 92.2 %  Auto Lymphocyte % : 3.5 %  Auto Monocyte % : 3.2 %  Auto Eosinophil % : 0.1 %  Auto Basophil % : 0.1 %    PT/INR - ( 08 Dec 2020 17:31 )   PT: 14.1 sec;   INR: 1.23 ratio         PTT - ( 08 Dec 2020 17:31 )  PTT:26.0 sec  Urinalysis Basic - ( 08 Dec 2020 19:14 )    Color: Yellow / Appearance: Clear / S.020 / pH: x  Gluc: x / Ketone: Negative  / Bili: Negative / Urobili: Negative mg/dL   Blood: x / Protein: 15 mg/dL / Nitrite: Negative   Leuk Esterase: Negative / RBC: 0-2 /HPF / WBC 0-2   Sq Epi: x / Non Sq Epi: Occasional / Bacteria: Occasional      12-10    136  |  103  |  32<H>  ----------------------------<  111<H>  4.1   |  25  |  0.84    Ca    8.2<L>      10 Dec 2020 07:15  Phos  3.5     12-09  Mg     2.0     12-10    TPro  6.4  /  Alb  2.1<L>  /  TBili  0.8  /  DBili  x   /  AST  50<H>  /  ALT  40  /  AlkPhos  61  12-09     # Pneumonia due to COVID-19 virus/Sepsis with Acute Hypoxic Respiratory Failure  - Meets sepsis criteria with leukocytosis, tachycardia, and suspected pulmonary source. Patient hypoxic to 83% on room air  - will discuss with ID Cefepime/Azithromycin, as risk of superimposed pneumonia is low in COVID patients  - Cont Dexamethasone 6 mg QD. 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 Biopsy versus follow up PET was planned. Lung cancer- s/p RT- plan was to evaluate response with PET in January.  - oncology follow up     # Elevated troponin  - Troponin minimally elevated x2, likely due to demand, EKG unremarkable. NO need to monitor on telemetry6) History of Hepatitis C  - With mild AST elevation  - No HCV treatment  - Reported history of cirrhosis  - F/u hepatic findings on chest CT  - Trend CMP  - Check HCV Ab and RNA    # COPD  - Will give Symbicort (Breo equivalent)  - Albuterol/Ipratropium standing  - stable, not on home O2    # Prophylactic measure  - DVT PPX: IMPROVE score of 3, will give Lovenox 40 mg SQ QD  - Diet: dysphagia 2, mechanical soft  - Dispo: pending improvement in sxs, weaning off O2    Advanced Care Planning/Counseling Discussion- Discussed advanced care planning, including code status, with the patient.   He stated that in the event of cardiac or pulmonary arrest, he would want CPR and/or intubation as otherwise medically indicated (FULL CODE)    discussed with RN and wife Myah

## 2020-12-10 NOTE — PROGRESS NOTE ADULT - ASSESSMENT
64 y/o Male with h/o squamous cell cancer of the jaw, COPD, cirrhosis, chronic HCV, GERD, and left lung squamous cell cancer was admitted on 12/8 for worsening SOB. For the past week, he has been having dyspnea, fatigue, and cough that has been gradually worsening. The cough is a yellow, mucoid cough, without blood. He denies chest pain, but endorses some wheezing. He has been laying in bed most of the past few days. He denies fever at home. He has chronic dysphagia due to his cancer, takes Ensure with difficulty and is able to swallow liquids. In the ED, he was given Cefepime 1 g IV x1 and Azithromycin 500 mg IV x1.    1. Acute respiratory failure. COVID-19 viral syndrome. Multifocal pneumonia.  Lung Ca s/p XRT.   -respiratory function is slightly improved  -has several risk factors for possible severe COVID disease  -concerned about superimposed underlying bacterial pneumonia in zoë of history of lung malignancy  -f/u BC x 2, sputum c/s  -on cefepime 2 gm IV q12h and azithromycin 500 mg IV qd # 2  -tolerating abx well so far; no side effects noted  -respiratory care  -droplet isolation  -steroids  -AC  -no need for remdesivir at this time  -monitor respiratory function  -O2 therapy  -continue abx coverage  -monitor temps  -f/u CBC  -supportive care  2. Other issues:   -care per medicine

## 2020-12-10 NOTE — DIETITIAN INITIAL EVALUATION ADULT. - OTHER INFO
Pt admitted with COPD, c/o persistent cough and SOB for the past week. Pt with pneumonia 2/2 covid-19 virus and sepsis with acute hypoxic respiratory failure. PMHx cirrhosis, HVC, squamous cell cancer- jaw/ left lung, has been having dyspnea, fatigue, and cough that has been gradually getting worse for the past week. Pt is on dysphagia diet 2 mechanical soft, he acknowledges chronic dysphagia diet due to cancer. Of note, his cancer of the left jaw was diagnosed in March 2020. He had chemotherapy from 04-06/2020. He was found to have a left lung apical lesion, which was biopsied in 07/2020 and found to be squamous cell cancer, although it was not clear its origin (primary lung vs metastasis). He had RT to the lung in 08/2020 and a PET scan in 10/2020 showed his left lung nodule to be stable at 1.2 x 0.8 cm, along with residual disease in his jaw. He states he is supposed to have a lung "procedure" later this month but he is not sure what it is. He has not had surgery for his cancer. Nutrition assessment performed by phone call, pt reports a decrease in appetite and intake since beginning chemotherapy in April 2020, and a severe decrease in appetite last week. Pt states that his appetite has been "better this week", he expressed interest in increasing his intake so that he can be "strong". This week, pt drank ensure 4 x/day and ate 2 meals/day with improved appetite but mentioned he drinks and eats with great difficulty. Pt expressed concern with recent wt loss, he reports his UBW as of June 2020 is 140 lb and states his CBW is 118 lb (15.7% unintentional wt loss over 6 mo: Severe wt loss), no daily wt has been recorded since admission. Pt is underweight as per BMI: 17.5. Pt denies n/v/d/c, NKFA. Pt admitted with COPD, c/o persistent cough and SOB for the past week. Pt with pneumonia 2/2 covid-19 virus and sepsis with acute hypoxic respiratory failure. PMHx cirrhosis, HVC, squamous cell cancer- jaw/ left lung, has been having dyspnea, fatigue, and cough that has been gradually getting worse for the past week. Pt is on dysphagia diet 2 mechanical soft, he acknowledges chronic dysphagia diet due to cancer. Of note, his cancer of the left jaw was diagnosed in March 2020. He had chemotherapy from 04-06/2020. He was found to have a left lung apical lesion, which was biopsied in 07/2020 and found to be squamous cell cancer, although it was not clear its origin (primary lung vs metastasis). He had RT to the lung in 08/2020 and a PET scan in 10/2020 showed his left lung nodule to be stable at 1.2 x 0.8 cm, along with residual disease in his jaw. He states he is supposed to have a lung "procedure" later this month but he is not sure what it is. He has not had surgery for his cancer.     Nutrition assessment performed by phone call, pt reports a decrease in appetite and intake since beginning chemotherapy in April 2020, and a severe decrease in appetite last week. Pt states that his appetite has been "better this week", he expressed interest in increasing his intake so that he can be "strong". This week, pt drank ensure 4 x/day (1400kcal and 80g protein; Only while in hospital) and ate 2 meals/day with improved appetite but mentioned he drinks and eats with great difficulty, unlikely pt was meeting needs outside hospital. Pt expressed concern with recent wt loss, he reports his UBW as of June 2020 is 140 lb and states his CBW is 118 lb (15.7% unintentional wt loss over 6 mo: Severe wt loss), no daily wt has been recorded since admission. Pt is underweight as per BMI: 17.5. Pt denies n/v/d/c, NKFA.    Rec: Pt admitted with COPD, c/o persistent cough and SOB for the past week. Pt with pneumonia 2/2 covid-19 virus and sepsis with acute hypoxic respiratory failure. PMHx cirrhosis, HVC, squamous cell cancer- jaw/ left lung, has been having dyspnea, fatigue, and cough that has been gradually getting worse for the past week. Pt is on dysphagia diet 2 mechanical soft, he acknowledges chronic dysphagia diet due to cancer. Of note, his cancer of the left jaw was diagnosed in March 2020. He had chemotherapy from 04-06/2020. He was found to have a left lung apical lesion, which was biopsied in 07/2020 and found to be squamous cell cancer, although it was not clear its origin (primary lung vs metastasis). He had RT to the lung in 08/2020 and a PET scan in 10/2020 showed his left lung nodule to be stable at 1.2 x 0.8 cm, along with residual disease in his jaw. He states he is supposed to have a lung "procedure" later this month but he is not sure what it is. He has not had surgery for his cancer.     Nutrition assessment performed by phone call, pt reports a decrease in appetite and intake since beginning chemotherapy in April 2020, and a severe decrease in appetite last week. Pt states that his appetite has been "better this week", he expressed interest in increasing his intake so that he can be "strong". This week, pt drank ensure 4 x/day (1400kcal and 80g protein; Only while in hospital) and ate 2 meals/day with improved appetite but mentioned he drinks and eats with great difficulty, unlikely pt was meeting needs outside hospital. Pt expressed concern with recent wt loss, he reports his UBW as of June 2020 is 140 lb and states his CBW is 118 lb (15.7% unintentional wt loss over 6 mo: Severe wt loss), no daily wt has been recorded since admission. Pt is underweight as per BMI: 17.5. Pt denies n/v/d/c, NKFA.    Rec: C/w ensure at least 2x/day and dysphagia diet 2 mechanical soft 3 meals/day, obtain current wt.

## 2020-12-10 NOTE — DIETITIAN INITIAL EVALUATION ADULT. - PERTINENT MEDS FT
MEDICATIONS  (STANDING):  ALBUTerol    90 MICROgram(s) HFA Inhaler 2 Puff(s) Inhalation every 6 hours  azithromycin  IVPB 500 milliGRAM(s) IV Intermittent every 24 hours  budesonide  80 MICROgram(s)/formoterol 4.5 MICROgram(s) Inhaler 2 Puff(s) Inhalation two times a day  cefepime   IVPB 2000 milliGRAM(s) IV Intermittent every 12 hours  dexAMETHasone  Injectable 6 milliGRAM(s) IV Push daily  enoxaparin Injectable 40 milliGRAM(s) SubCutaneous daily  ipratropium 17 MICROgram(s) HFA Inhaler 1 Puff(s) Inhalation every 6 hours    MEDICATIONS  (PRN):  acetaminophen   Tablet .. 650 milliGRAM(s) Oral every 6 hours PRN Temp greater or equal to 38C (100.4F), Mild Pain (1 - 3)  benzonatate 100 milliGRAM(s) Oral three times a day PRN Cough  ondansetron Injectable 4 milliGRAM(s) IV Push every 6 hours PRN Nausea and/or Vomiting

## 2020-12-10 NOTE — DIETITIAN INITIAL EVALUATION ADULT. - PERTINENT LABORATORY DATA
12-10 Na136 mmol/L Glu 111 mg/dL<H> K+ 4.1 mmol/L Cr  0.84 mg/dL BUN 32 mg/dL<H> Phos n/a    PAB n/a       12-9 Alb 2.1 g/dL  AST/SGOT 50 U/L  Hepatitis C virus S/CO Ratio 14.05 S/CO

## 2020-12-10 NOTE — DIETITIAN INITIAL EVALUATION ADULT. - ORAL INTAKE PTA/DIET HISTORY
pt reports a decrease in appetite and intake since beginning chemotherapy in April 2020, and a severe decrease in appetite last week. Pt states that his appetite has been "better this week", he expressed interest in increasing his intake so that he can be "strong". This week, pt drank ensure 4 x/day and ate 2 meals/day with improved appetite but mentioned he drinks and eats with great difficulty

## 2020-12-11 NOTE — DISCHARGE NOTE PROVIDER - NSDCCPCAREPLAN_GEN_ALL_CORE_FT
PRINCIPAL DISCHARGE DIAGNOSIS  Diagnosis: COPD (chronic obstructive pulmonary disease)  Assessment and Plan of Treatment: - cotninue with home inahalers      SECONDARY DISCHARGE DIAGNOSES  Diagnosis: History of hepatitis C  Assessment and Plan of Treatment: - close follow up with GI doctor    Diagnosis: Squamous cell lung cancer  Assessment and Plan of Treatment: # stage T4b, N1 squamous cancer of left retromolar trigone and  T1 squamous cell cancer of Left lung versus solitary met  s/p definitive chemoRT to  his oral cancer completed June 2020  ( done at [facility]) and s/p stereotactic RT to left lung tumor ( [facility] [place]  August 2020)  -  recent follow up scans showed persistent uptake in  area- of primary tumor in the  mouth area :  residual disease versus radiation necrosis. Biopsy versus follow up PET was planned. Lung cancer- s/p RT- plan was to evaluate response with PET in January.  - oncology follow up with Dr. Poole      Diagnosis: Hypoxia  Assessment and Plan of Treatment: # Acute hypoxic respiratory failure  # COVID-19 viral syndrome  # Vital Multifocal pneumonia  # Lung Ca s/p XRT.   - you dont need O2  - has several risk factors for possible severe COVID disease - you need to monitor yourself closely for any SOB. IF SOB to worsen please come back in the hospital  - concerned about superimposed underlying bacterial pneumonia in zoë of history of lung malignancy  - IV antibiotics changed to oral antibiotics - 7 more days  - your D-dimer was high -  you need to be on blood thinners for 30 days  - please continue with decadrone 7 more days       PRINCIPAL DISCHARGE DIAGNOSIS  Diagnosis: COPD (chronic obstructive pulmonary disease)  Assessment and Plan of Treatment: - cotninue with home inahalers      SECONDARY DISCHARGE DIAGNOSES  Diagnosis: History of hepatitis C  Assessment and Plan of Treatment: - close follow up with GI doctor    Diagnosis: Squamous cell lung cancer  Assessment and Plan of Treatment: # stage T4b, N1 squamous cancer of left retromolar trigone and  T1 squamous cell cancer of Left lung versus solitary met  s/p definitive chemoRT to  his oral cancer completed June 2020  ( done at [facility]) and s/p stereotactic RT to left lung tumor ( [facility] [place]  August 2020)  -  recent follow up scans showed persistent uptake in  area- of primary tumor in the  mouth area :  residual disease versus radiation necrosis. Biopsy versus follow up PET was planned. Lung cancer- s/p RT- plan was to evaluate response with PET in January.  - oncology follow up with Dr. Poole      Diagnosis: Hypoxia  Assessment and Plan of Treatment: # Acute hypoxic respiratory failure  # COVID-19 viral syndrome  # Vital Multifocal pneumonia  # Lung Ca s/p XRT.   - you will be going home with home O2  - has several risk factors for possible severe COVID disease - you need to monitor yourself closely for any SOB. IF SOB to worsen please come back in the hospital  - concerned about superimposed underlying bacterial pneumonia in zo[place] of history of lung malignancy  - IV antibiotics changed to oral antibiotics - 7 more days  - your D-dimer was high -  you need to be on blood thinners for 30 days  - please continue with decadrone 7 more days

## 2020-12-11 NOTE — DISCHARGE NOTE PROVIDER - HOSPITAL COURSE
Patient is a 64 y/o/m w/ hx of squamous cell cancer of the jaw, COPD, cirrhosis, HCV, GERD, and left lung squamous cell cancer admitted in the hospital with cc of SOB. Associated symptoms of fatigue, and cough that has been gradually worsening. The cough is a yellow, mucoid cough, without blood. He denies chest pain, but endorses some wheezing. He has been laying in bed most of the past few days. He denies fevers, nausea/vomiting, abdominal pain, diarrhea, leg swelling, rash, or urinary complaints. He normally uses his Breo inhaler only as needed, but the past week he has used a at least 2-3 times.    Of note, his cancer of the left jaw was diagnosed in March 2020. He had chemotherapy from 04-06/2020. He was found to have a left lung apical lesion, which was biopsied in 07/2020 and found to be squamous cell cancer, although it was not clear its origin (primary lung vs metastasis). He had RT to the lung in 08/2020 and a PET scan in 10/2020 showed his left lung nodule to be stable at 1.2 x 0.8 cm, along with residual disease in his jaw. He states he is supposed to have a lung "procedure" later this month but he is not sure whet it is. He has not had surgery for his cancer.  he does acknowledge chronic dysphagia due to his cancer, takes Ensure with difficulty and is able to swallow liquids. he denies any worsening of this difficulty recently.  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    12/11: Patient seen and eval. hemodynamically stable. Denies any HA, CP, SOB. Patient feels great as per patient. Currently not requiring O2.     Physical Exam:   GENERAL APPEARANCE:  Deconditioned, frail.   T(C): 36.6 (12-11-20 @ 08:14), Max: 36.6 (12-11-20 @ 08:14)  HR: 65 (12-11-20 @ 08:14) (65 - 73)  BP: 127/67 (12-11-20 @ 08:14) (106/61 - 127/67)  RR: 18 (12-11-20 @ 08:14) (18 - 19)  SpO2: 95% (12-11-20 @ 08:14) (93% - 96%)  HEENT: normocephalic  Skin:  thin , dry  NECK:  Supple without lymphadenopathy.   HEART:  Regular rate and rhythm. normal S1 and S2, No M/R/G  LUNGS:  Good ins/exp effort, no W/R/R/C  ABDOMEN:  Soft, nontender, nondistended with good bowel sounds heard  EXTREMITIES:  Without cyanosis, clubbing or edema.   NEUROLOGICAL:  Gross nonfocal   Patient is a 66 y/o/m w/ hx of squamous cell cancer of the jaw, COPD, cirrhosis, HCV, GERD, and left lung squamous cell cancer admitted in the hospital with cc of SOB. Associated symptoms of fatigue, and cough that has been gradually worsening. The cough is a yellow, mucoid cough, without blood. He denies chest pain, but endorses some wheezing. He has been laying in bed most of the past few days. He denies fevers, nausea/vomiting, abdominal pain, diarrhea, leg swelling, rash, or urinary complaints. He normally uses his Breo inhaler only as needed, but the past week he has used a at least 2-3 times.    Of note, his cancer of the left jaw was diagnosed in March 2020. He had chemotherapy from 04-06/2020. He was found to have a left lung apical lesion, which was biopsied in 07/2020 and found to be squamous cell cancer, although it was not clear its origin (primary lung vs metastasis). He had RT to the lung in 08/2020 and a PET scan in 10/2020 showed his left lung nodule to be stable at 1.2 x 0.8 cm, along with residual disease in his jaw. He states he is supposed to have a lung "procedure" later this month but he is not sure whet it is. He has not had surgery for his cancer.  he does acknowledge chronic dysphagia due to his cancer, takes Ensure with difficulty and is able to swallow liquids. he denies any worsening of this difficulty recently.  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    12/11: Patient seen and eval. hemodynamically stable. Denies any HA, CP, SOB. Patient feels great as per patient. Currently not requiring O2.     Pt has a acute dx of COVOD infection and is requiring home O2, to decrease hospital capacity and decrease the risk    Physical Exam:   GENERAL APPEARANCE:  Deconditioned, frail.   T(C): 36.6 (12-11-20 @ 08:14), Max: 36.6 (12-11-20 @ 08:14)  HR: 65 (12-11-20 @ 08:14) (65 - 73)  BP: 127/67 (12-11-20 @ 08:14) (106/61 - 127/67)  RR: 18 (12-11-20 @ 08:14) (18 - 19)  SpO2: 95% (12-11-20 @ 08:14) (93% - 96%)  HEENT: normocephalic  Skin:  thin , dry  NECK:  Supple without lymphadenopathy.   HEART:  Regular rate and rhythm. normal S1 and S2, No M/R/G  LUNGS:  Good ins/exp effort, no W/R/R/C  ABDOMEN:  Soft, nontender, nondistended with good bowel sounds heard  EXTREMITIES:  Without cyanosis, clubbing or edema.   NEUROLOGICAL:  Gross nonfocal   Patient is a 66 y/o/m w/ hx of squamous cell cancer of the jaw, COPD, cirrhosis, HCV, GERD, and left lung squamous cell cancer admitted in the hospital with cc of SOB. Associated symptoms of fatigue, and cough that has been gradually worsening. The cough is a yellow, mucoid cough, without blood. He denies chest pain, but endorses some wheezing. He has been laying in bed most of the past few days. He denies fevers, nausea/vomiting, abdominal pain, diarrhea, leg swelling, rash, or urinary complaints. He normally uses his Breo inhaler only as needed, but the past week he has used a at least 2-3 times.    Of note, his cancer of the left jaw was diagnosed in March 2020. He had chemotherapy from 04-06/2020. He was found to have a left lung apical lesion, which was biopsied in 07/2020 and found to be squamous cell cancer, although it was not clear its origin (primary lung vs metastasis). He had RT to the lung in 08/2020 and a PET scan in 10/2020 showed his left lung nodule to be stable at 1.2 x 0.8 cm, along with residual disease in his jaw. He states he is supposed to have a lung "procedure" later this month but he is not sure whet it is. He has not had surgery for his cancer.  he does acknowledge chronic dysphagia due to his cancer, takes Ensure with difficulty and is able to swallow liquids. he denies any worsening of this difficulty recently.  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    12/11: Patient seen and eval. hemodynamically stable. Denies any HA, CP, SOB. Patient feels great as per patient.     Pt has a acute dx of COVOD infection and is requiring home O2, to decrease hospital capacity and decrease the risk    Physical Exam:   GENERAL APPEARANCE:  Deconditioned, frail.   T(C): 36.6 (12-11-20 @ 08:14), Max: 36.6 (12-11-20 @ 08:14)  HR: 65 (12-11-20 @ 08:14) (65 - 73)  BP: 127/67 (12-11-20 @ 08:14) (106/61 - 127/67)  RR: 18 (12-11-20 @ 08:14) (18 - 19)  SpO2: 95% (12-11-20 @ 08:14) (93% - 96%)  HEENT: normocephalic  Skin:  thin , dry  NECK:  Supple without lymphadenopathy.   HEART:  Regular rate and rhythm. normal S1 and S2, No M/R/G  LUNGS:  Good ins/exp effort, no W/R/R/C  ABDOMEN:  Soft, nontender, nondistended with good bowel sounds heard  EXTREMITIES:  Without cyanosis, clubbing or edema.   NEUROLOGICAL:  Gross nonfocal   Patient is a 64 y/o/m w/ hx of squamous cell cancer of the jaw, COPD, cirrhosis, HCV, GERD, and left lung squamous cell cancer admitted in the hospital with cc of SOB. Associated symptoms of fatigue, and cough that has been gradually worsening. The cough is a yellow, mucoid cough, without blood. He denies chest pain, but endorses some wheezing. He has been laying in bed most of the past few days. He denies fevers, nausea/vomiting, abdominal pain, diarrhea, leg swelling, rash, or urinary complaints. He normally uses his Breo inhaler only as needed, but the past week he has used a at least 2-3 times.    Of note, his cancer of the left jaw was diagnosed in March 2020. He had chemotherapy from 04-06/2020. He was found to have a left lung apical lesion, which was biopsied in 07/2020 and found to be squamous cell cancer, although it was not clear its origin (primary lung vs metastasis). He had RT to the lung in 08/2020 and a PET scan in 10/2020 showed his left lung nodule to be stable at 1.2 x 0.8 cm, along with residual disease in his jaw. He states he is supposed to have a lung "procedure" later this month but he is not sure whet it is. He has not had surgery for his cancer.  he does acknowledge chronic dysphagia due to his cancer, takes Ensure with difficulty and is able to swallow liquids. he denies any worsening of this difficulty recently.  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    12/11: Patient seen and eval. hemodynamically stable. Denies any HA, CP, SOB. Patient feels great as per patient.     Pt has a acute dx of COVID infection and is requiring home O2, to decrease hospital capacity and decrease the risk    12/14- s/e, pt comfortable, has been off O2 and ambulating comfortably. O2 on rest and ambulation rechecked and pt no longer requires home oxygen.      Physical Exam:   GENERAL APPEARANCE:  Deconditioned, frail.   T(C): 36.6 (12-11-20 @ 08:14), Max: 36.6 (12-11-20 @ 08:14)  HR: 65 (12-11-20 @ 08:14) (65 - 73)  BP: 127/67 (12-11-20 @ 08:14) (106/61 - 127/67)  RR: 18 (12-11-20 @ 08:14) (18 - 19)  SpO2: 95% (12-11-20 @ 08:14) (93% - 96%)  HEENT: normocephalic  Skin:  thin , dry  NECK:  Supple without lymphadenopathy.   HEART:  Regular rate and rhythm. normal S1 and S2, No M/R/G  LUNGS:  Good ins/exp effort, no W/R/R/C  ABDOMEN:  Soft, nontender, nondistended with good bowel sounds heard  EXTREMITIES:  Without cyanosis, clubbing or edema.   NEUROLOGICAL:  Gross nonfocal     ---  HOSPITAL COURSE:   Pt initially hypoxic d/t multifocal pna determined to be d/t COVID.  Pt completed course of IV antibiotics as recommended by ID.  His hypoxia also resolved and he no longer required home oxygen.  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 recent follow up scans showed persistent uptake in  area- of primary tumor in the  mouth area :  residual disease versus radiation necrosis. Biopsy versus follow up PET was planned. Lung cancer- s/p RT- plan was to evaluate response with PET in January.  Now admitted with Covid pneumonia. High risk patient due to COPD/ comorbidities.  Will re-evaluate status of his cancer  after he recovers from Covid infection.   He was evaluated by ID, placed on IV abx and recommended to change to oral.  Pt is medically stable at this time for dc home.     Pneumonia due to COVID-19 virus/Sepsis with Acute Hypoxic Respiratory Failure  - patient treated and followed up with the I.D.  resolved, and does not require Home oxygen  - po ceftin     Squamous cell cancer of retromolar trigone/squamous cell lung cancer  - Lung nodule with Bx 07/2020 showing squamous cell lung cancer, but unclear of origin (lung vs metastasis from jaw)  - CT chest does not show obvious interval progression  - oncology follow up as an out patient for furhter management     COPD- stable now   - continue with the symbicort as an out patient   - bronchodilators as per the pulmonary upon the discharge his baseline medications   - follow up with the pulmonary as an out patient   ---  CONSULTANTS:   ID, hem/onc  ---  TIME SPENT:  I, the attending physician, was physically present for the key portions of the evaluation and management (E/M) service provided. The total amount of time spent reviewing the hospital notes, laboratory values, imaging findings, assessing/counseling the patient, discussing with consultant physicians, social work, nursing staff was 80 minutes

## 2020-12-11 NOTE — PROGRESS NOTE ADULT - SUBJECTIVE AND OBJECTIVE BOX
Date of service: 20 @ 13:06    Lying in bed in NAD  Has dry cough  No SOB at rest    ROS: no fever or chills; denies dizziness, no HA, no abdominal pain, no diarrhea or constipation; no dysuria, no legs pain, no rashes    MEDICATIONS  (STANDING):  azithromycin  IVPB 500 milliGRAM(s) IV Intermittent every 24 hours  budesonide  80 MICROgram(s)/formoterol 4.5 MICROgram(s) Inhaler 2 Puff(s) Inhalation two times a day  cefepime   IVPB 2000 milliGRAM(s) IV Intermittent every 12 hours  dexAMETHasone  Injectable 6 milliGRAM(s) IV Push daily  enoxaparin Injectable 40 milliGRAM(s) SubCutaneous daily  ipratropium 17 MICROgram(s) HFA Inhaler 1 Puff(s) Inhalation every 6 hours    Vital Signs Last 24 Hrs  T(C): 36.6 (11 Dec 2020 08:14), Max: 36.6 (11 Dec 2020 08:14)  T(F): 97.9 (11 Dec 2020 08:14), Max: 97.9 (11 Dec 2020 08:14)  HR: 65 (11 Dec 2020 08:14) (65 - 73)  BP: 127/67 (11 Dec 2020 08:14) (106/61 - 127/67)  BP(mean): --  RR: 18 (11 Dec 2020 08:14) (18 - 19)  SpO2: 95% (11 Dec 2020 08:14) (93% - 96%)     Physical exam:    Constitutional:  No acute distress  HEENT: NC/AT, EOMI, PERRLA, conjunctivae clear  Neck: supple; thyroid not palpable  Back: no tenderness  Respiratory: respiratory effort normal; crackles b/l  Cardiovascular: S1S2 regular, no murmurs  Abdomen: soft, not tender, not distended, positive BS  Genitourinary: no suprapubic tenderness  Lymphatic: no LN palpable  Musculoskeletal: no muscle tenderness, no joint swelling or tenderness  Extremities: no pedal edema  Neurological/ Psychiatric: AxOx3, moving all extremities  Skin: no rashes; no palpable lesions    Labs: reviewed                        9.6    15.23 )-----------( 143      ( 10 Dec 2020 07:15 )             28.4     12-10    136  |  103  |  32<H>  ----------------------------<  111<H>  4.1   |  25  |  0.84    Ca    8.2<L>      10 Dec 2020 07:15  Mg     2.0     12-10    C-Reactive Protein, Serum: 4.88 mg/dL (20 @ 08:22)  D-Dimer Assay, Quantitative: 946 ng/mL DDU (20 @ 08:22)  Ferritin, Serum: 923 ng/mL (20 @ 08:22)                        9.6    15.23 )-----------( 143      ( 10 Dec 2020 07:15 )             28.4     12-10    136  |  103  |  32<H>  ----------------------------<  111<H>  4.1   |  25  |  0.84    Ca    8.2<L>      10 Dec 2020 07:15  Phos  3.5     12-09  Mg     2.0     12-10    TPro  6.4  /  Alb  2.1<L>  /  TBili  0.8  /  DBili  x   /  AST  50<H>  /  ALT  40  /  AlkPhos  61      C-Reactive Protein, Serum: 4.88 mg/dL (20 @ 08:22)  D-Dimer Assay, Quantitative: 946 ng/mL DDU (20 @ 08:22)  Ferritin, Serum: 923 ng/mL (20 @ 08:22)                        9.9    9.63  )-----------( 123      ( 09 Dec 2020 08:22 )             30.7     12-    134<L>  |  102  |  33<H>  ----------------------------<  154<H>  4.6   |  27  |  0.83    Ca    8.4<L>      09 Dec 2020 08:22  Phos  3.5     12-  Mg     1.9     12-    TPro  6.4  /  Alb  2.1<L>  /  TBili  0.8  /  DBili  x   /  AST  50<H>  /  ALT  40  /  AlkPhos  61       LIVER FUNCTIONS - ( 09 Dec 2020 08:22 )  Alb: 2.1 g/dL / Pro: 6.4 gm/dL / ALK PHOS: 61 U/L / ALT: 40 U/L / AST: 50 U/L / GGT: x           Urinalysis Basic - ( 08 Dec 2020 19:14 )    Color: Yellow / Appearance: Clear / S.020 / pH: x  Gluc: x / Ketone: Negative  / Bili: Negative / Urobili: Negative mg/dL   Blood: x / Protein: 15 mg/dL / Nitrite: Negative   Leuk Esterase: Negative / RBC: 0-2 /HPF / WBC 0-2   Sq Epi: x / Non Sq Epi: Occasional / Bacteria: Occasional      Culture - Urine (collected 08 Dec 2020 19:14)  Source: .Urine Clean Catch (Midstream)  Final Report (09 Dec 2020 21:16):    No growth    Culture - Blood (collected 08 Dec 2020 17:31)  Source: .Blood Blood-Peripheral  Preliminary Report (10 Dec 2020 01:02):    No growth to date.    Culture - Blood (collected 08 Dec 2020 17:31)  Source: .Blood Blood-Peripheral  Preliminary Report (10 Dec 2020 01:02):    No growth to date.    COVID-19 PCR: Detected (20 @ 17:31)    Radiology: all available radiological tests reviewed    < from: CT Chest w/ IV Cont (20 @ 21:23) >  Interval progression and new areas of infection in left upper lobe, right middle lobe and both lower lobes.  No interval change of spiculated 8 mm sized left upper lobe nodule.  Severe centrilobular and paraseptal emphysema.    < end of copied text >      Advanced directives addressed: full resuscitation

## 2020-12-11 NOTE — DISCHARGE NOTE PROVIDER - NSDCFUADDAPPT_GEN_ALL_CORE_FT
Close follow up with your primary care physician   Close follow up with your pulmonary doctor  close follow up with oncology

## 2020-12-11 NOTE — CHART NOTE - NSCHARTNOTEFT_GEN_A_CORE
Patients discharge o2 sat was done on 12/10/20... pt qualified for home o2. results are good for 48hrs.. discharge planning aware of the patients need for home oxygen..

## 2020-12-11 NOTE — DISCHARGE NOTE PROVIDER - CARE PROVIDER_API CALL
Ganesh Ford  [address], [place] [address]  Phone: [phone]  Fax: [phone]  Follow Up Time:

## 2020-12-11 NOTE — PROGRESS NOTE ADULT - ASSESSMENT
66 y/o Male with h/o squamous cell cancer of the jaw, COPD, cirrhosis, chronic HCV, GERD, and left lung squamous cell cancer was admitted on 12/8 for worsening SOB. For the past week, he has been having dyspnea, fatigue, and cough that has been gradually worsening. The cough is a yellow, mucoid cough, without blood. He denies chest pain, but endorses some wheezing. He has been laying in bed most of the past few days. He denies fever at home. He has chronic dysphagia due to his cancer, takes Ensure with difficulty and is able to swallow liquids. In the ED, he was given Cefepime 1 g IV x1 and Azithromycin 500 mg IV x1.    1. Acute respiratory failure resolving. COVID-19 viral syndrome. Multifocal pneumonia.  Lung Ca s/p XRT.   -respiratory function is improved  -has several risk factors   -concerned about superimposed underlying bacterial pneumonia in zoë of history of lung malignancy  -BC x 2 reviewed  -on cefepime 2 gm IV q12h and azithromycin 500 mg IV qd # 3  -tolerating abx well so far; no side effects noted  -respiratory care  -droplet isolation  -steroids  -AC  -O2 therapy  -may change abx to ceftin 500 mg PO q12h for 7 more days  -monitor temps  -f/u CBC  -supportive care  2. Other issues:   -care per medicine

## 2020-12-11 NOTE — DISCHARGE NOTE PROVIDER - NSDCMRMEDTOKEN_GEN_ALL_CORE_FT
Breo Ellipta 100 mcg-25 mcg/inh inhalation powder: 1 puff(s) inhaled once a day  cefuroxime 500 mg oral tablet: 1 tab(s) orally 2 times a day   dexamethasone 6 mg oral tablet: 1 tab(s) orally once a day   ipratropium CFC free 17 mcg/inh inhalation aerosol: 1 puff(s) inhaled every 6 hours  rivaroxaban 10 mg oral tablet: 1 tab(s) orally once a day

## 2020-12-12 NOTE — DISCHARGE NOTE NURSING/CASE MANAGEMENT/SOCIAL WORK - PATIENT PORTAL LINK FT
You can access the FollowMyHealth Patient Portal offered by [facility] by registering at the following website: [email or web address]. By joining [facility]’s FollowMyHealth portal, you will also be able to view your health information using other applications (apps) compatible with our system.

## 2020-12-12 NOTE — PROGRESS NOTE ADULT - SUBJECTIVE AND OBJECTIVE BOX
Patient is a 66 y/o/m w/ hx of squamous cell cancer of the jaw, COPD, cirrhosis, HCV, GERD, and left lung squamous cell cancer admitted in the hospital with cc of SOB. Associated symptoms of fatigue, and cough that has been gradually worsening. The cough is a yellow, mucoid cough, without blood. He denies chest pain, but endorses some wheezing. He has been laying in bed most of the past few days. He denies fevers, nausea/vomiting, abdominal pain, diarrhea, leg swelling, rash, or urinary complaints. He normally uses his Breo inhaler only as needed, but the past week he has used a at least 2-3 times.    Of note, his cancer of the left jaw was diagnosed in March 2020. He had chemotherapy from 04-06/2020. He was found to have a left lung apical lesion, which was biopsied in 07/2020 and found to be squamous cell cancer, although it was not clear its origin (primary lung vs metastasis). He had RT to the lung in 08/2020 and a PET scan in 10/2020 showed his left lung nodule to be stable at 1.2 x 0.8 cm, along with residual disease in his jaw. He states he is supposed to have a lung "procedure" later this month but he is not sure whet it is. He has not had surgery for his cancer.  he does acknowledge chronic dysphagia due to his cancer, takes Ensure with difficulty and is able to swallow liquids. he denies any worsening of this difficulty recently.  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    12/11: Patient seen and eval. hemodynamically stable. Denies any HA, CP, SOB. Patient feels great as per patient.   12/12 patient seen and today offers no symptoms of acute sob or cough or new fever spikes   patient is being planned for the discharge today        Physical Exam:   GENERAL APPEARANCE:  Deconditioned, frail.     T(C): 36.4 (12 Dec 2020 08:12), Max: 36.9 (11 Dec 2020 15:26)  T(F): 97.5 (12 Dec 2020 08:12), Max: 98.5 (11 Dec 2020 15:26)  HR: 77 (12 Dec 2020 09:05) (75 - 81)  BP: 120/70 (12 Dec 2020 08:12) (104/69 - 120/70)  BP(mean): --  ABP: --  ABP(mean): --  RR: 17 (12 Dec 2020 08:12) (17 - 19)  SpO2: 96% (12 Dec 2020 08:12) (94% - 96%)    HEENT: normocephalic  Skin:  thin , dry  NECK:  Supple without lymphadenopathy.   HEART:  Regular rate and rhythm. normal S1 and S2, No M/R/G  LUNGS:  Good ins/exp effort, no W/R/R/C  ABDOMEN:  Soft, nontender, nondistended with good bowel sounds heard  EXTREMITIES:  Without cyanosis, clubbing or edema.   NEUROLOGICAL:  Gross nonfocal

## 2020-12-12 NOTE — PROGRESS NOTE ADULT - ASSESSMENT
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

## 2020-12-13 NOTE — PROGRESS NOTE ADULT - NUTRITIONAL ASSESSMENT
This patient has been assessed with a concern for Malnutrition and has been determined to have a diagnosis/diagnoses of Severe protein-calorie malnutrition and Underweight/BMI < 19.    This patient is being managed with:   Diet Dysphagia 2 Mechanical Soft-Thin Liquids-  Supplement Feeding Modality:  Oral  Ensure Clear Cans or Servings Per Day:  2       Frequency:  Two Times a day  Entered: Dec  8 2020  8:51PM    
This patient has been assessed with a concern for Malnutrition and has been determined to have a diagnosis/diagnoses of Severe protein-calorie malnutrition and Underweight/BMI < 19.    This patient is being managed with:   Diet Dysphagia 2 Mechanical Soft-Thin Liquids-  Supplement Feeding Modality:  Oral  Ensure Enlive Servings Per Day:  1       Frequency:  Three Times a day  Entered: Dec 13 2020  4:02PM    
This patient has been assessed with a concern for Malnutrition and has been determined to have a diagnosis/diagnoses of Severe protein-calorie malnutrition and Underweight/BMI < 19.    This patient is being managed with:   Diet Dysphagia 2 Mechanical Soft-Thin Liquids-  Supplement Feeding Modality:  Oral  Ensure Clear Cans or Servings Per Day:  2       Frequency:  Two Times a day  Entered: Dec  8 2020  8:51PM

## 2020-12-13 NOTE — PROGRESS NOTE ADULT - ASSESSMENT
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  - Lung nodule with Bx 07/2020 showing squamous cell lung cancer, but unclear of origin (lung vs metastasis from jaw)  - CT chest does not show obvious interval progression  - oncology follow up as an out patient for [facility] management     COPD  - continue with the symbicort as an out patient   - bronchodilators as per the pulmonary upon the discharge his baseline medications   - follow up with the pulmonary as an out patient     please review the discharge summery done by the Dr womack  and patient is stable for the discharge and follow up with the out patient providers for his lung cancer with oncology and with the pulmonary regarding continued need for the home 02

## 2020-12-13 NOTE — PROGRESS NOTE ADULT - REASON FOR ADMISSION
dyspnea, ?pneumonia

## 2020-12-13 NOTE — PROGRESS NOTE ADULT - SUBJECTIVE AND OBJECTIVE BOX
Patient is a 66 y/o/m w/ hx of squamous cell cancer of the jaw, COPD, cirrhosis, HCV, GERD, and left lung squamous cell cancer admitted in the hospital with cc of SOB. Associated symptoms of fatigue, and cough that has been gradually worsening. The cough is a yellow, mucoid cough, without blood. He denies chest pain, but endorses some wheezing. He has been laying in bed most of the past few days. He denies fevers, nausea/vomiting, abdominal pain, diarrhea, leg swelling, rash, or urinary complaints. He normally uses his Breo inhaler only as needed, but the past week he has used a at least 2-3 times.    Of note, his cancer of the left jaw was diagnosed in March 2020. He had chemotherapy from 04-06/2020. He was found to have a left lung apical lesion, which was biopsied in 07/2020 and found to be squamous cell cancer, although it was not clear its origin (primary lung vs metastasis). He had RT to the lung in 08/2020 and a PET scan in 10/2020 showed his left lung nodule to be stable at 1.2 x 0.8 cm, along with residual disease in his jaw. He states he is supposed to have a lung "procedure" later this month but he is not sure whet it is. He has not had surgery for his cancer.  he does acknowledge chronic dysphagia due to his cancer, takes Ensure with difficulty and is able to swallow liquids. he denies any worsening of this difficulty recently.  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    12/11: Patient seen and eval. hemodynamically stable. Denies any HA, CP, SOB. Patient feels great as per patient.   12/12 patient seen and today offers no symptoms of acute sob or cough or new fever spikes   patient is being planned for the discharge today.    12/13/20 patient was not discharged yesterday in view of 02 set up  has mild cough with no fever   completed azithromycin course   while in the hospital of on iv cefepime and decadron   not using 02 at rest now         Physical Exam:     GENERAL APPEARANCE:  Deconditioned, frail.   Vital Signs Last 24 Hrs  T(C): 36.8 (13 Dec 2020 16:52), Max: 36.8 (13 Dec 2020 16:52)  T(F): 98.2 (13 Dec 2020 16:52), Max: 98.2 (13 Dec 2020 16:52)  HR: 96 (13 Dec 2020 16:52) (69 - 96)  BP: 107/62 (13 Dec 2020 16:52) (102/59 - 115/62)  BP(mean): --  RR: 18 (13 Dec 2020 16:52) (17 - 18)  SpO2: 97% (13 Dec 2020 16:52) (93% - 98%)    HEENT: normocephalic  Skin:  thin , dry  NECK:  Supple without lymphadenopathy.   HEART:  Regular rate and rhythm. normal S1 and S2, No M/R/G  LUNGS:  mild few rales over the bases   ABDOMEN:  Soft, nontender, nondistended with good bowel sounds heard  EXTREMITIES:  Without cyanosis, clubbing or edema.   NEUROLOGICAL:  Gross nonfocal

## 2020-12-14 NOTE — CHART NOTE - NSCHARTNOTEFT_GEN_A_CORE
HOME O2 EVALUATION    Pulse Ox Room Air Rest:100    Pulse Ox Room Air Ambulatin    Pulse Ox on O2          L Ambulating:    Pulse Ox Post Ambulation:

## 2020-12-21 PROBLEM — C44.329 SQUAMOUS CELL CARCINOMA OF SKIN OF OTHER PARTS OF FACE: Chronic | Status: ACTIVE | Noted: 2020-01-01

## 2020-12-21 PROBLEM — K74.60 UNSPECIFIED CIRRHOSIS OF LIVER: Chronic | Status: ACTIVE | Noted: 2020-01-01

## 2020-12-21 PROBLEM — K21.9 GASTRO-ESOPHAGEAL REFLUX DISEASE WITHOUT ESOPHAGITIS: Chronic | Status: ACTIVE | Noted: 2020-01-01

## 2020-12-21 PROBLEM — R91.1 SOLITARY PULMONARY NODULE: Chronic | Status: ACTIVE | Noted: 2020-01-01

## 2020-12-30 PROBLEM — I73.9 PAD (PERIPHERAL ARTERY DISEASE): Status: ACTIVE | Noted: 2020-01-01

## 2020-12-30 PROBLEM — K21.9 GERD (GASTROESOPHAGEAL REFLUX DISEASE): Status: ACTIVE | Noted: 2020-01-01

## 2020-12-30 PROBLEM — K74.60 HEPATIC CIRRHOSIS, UNSPECIFIED HEPATIC CIRRHOSIS TYPE, UNSPECIFIED WHETHER ASCITES PRESENT: Status: ACTIVE | Noted: 2020-04-02

## 2020-12-30 PROBLEM — B19.20 HEPATITIS-C: Status: ACTIVE | Noted: 2018-12-20

## 2020-12-30 NOTE — HISTORY OF PRESENT ILLNESS
[FreeTextEntry1] : initial physical examination [de-identified] : Mr. Echols presents for initial physical examination. He is a 65-year-old male with a history of carcinoma of the jaw. He was initially diagnosed in in March 2020. At that time he had presented with jaw pain and an oral nodule. Biopsy was consistent with squamous cell carcinoma. Patient had a CT/PET scan which also demonstrated a 1.1 cm nodule in the left upper lobe. Mr. Echols was subsequently treated with radiation and chemotherapy for his jaw carcinoma. Surgery was canceled due to significant response to radiation and chemotherapy. Mr. Echols then went to [facility] where he had SBRT therapy to the left upper lobe lesion. Mr. Echols was admitted to [facility] in early December with shortness of breath and found to be positive for rotavirus. He was treated with Lantus. Decadron and subsequently discharged. He now presents for primary care followup. He continues to followup with oncology and surgery regarding his squamous cell carcinoma of the jaw. He did have a repeat CT/PET scan in October. That showed an SUV of 2.1 in the left upper lobe lesion as well as residual activity in the retromolar trigone region. Patient denies any chest pain or palpitations. 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 Patient states he is HIV negative. He also has a 45-pack-year smoking history but stopped smoking several months ago.

## 2020-12-30 NOTE — PLAN
[FreeTextEntry1] : Mr. Echols presents for initial evaluation. Complete pulmonary function tests have been ordered. Patient will continue on Symbicort 80/4.5 mcg 2 puffs b.i.d. and Atrovent 2 puffs q.i.d. p.r.n. He is on Zaroxolyn 10 mg daily for a total of 30 days post coronavirus infection. He will continue followup with oncology and surgery with regard to his squamous cell carcinoma of the jaw. Patient is scheduled for repeat CT/PET scan. He does not require further radiation oncology followup.

## 2021-01-01 ENCOUNTER — APPOINTMENT (OUTPATIENT)
Dept: NUCLEAR MEDICINE | Facility: CLINIC | Age: 66
End: 2021-01-01
Payer: MEDICARE

## 2021-01-01 ENCOUNTER — TRANSCRIPTION ENCOUNTER (OUTPATIENT)
Age: 66
End: 2021-01-01

## 2021-01-01 ENCOUNTER — APPOINTMENT (OUTPATIENT)
Dept: HEMATOLOGY ONCOLOGY | Facility: CLINIC | Age: 66
End: 2021-01-01
Payer: MEDICARE

## 2021-01-01 ENCOUNTER — APPOINTMENT (OUTPATIENT)
Dept: OTOLARYNGOLOGY | Facility: CLINIC | Age: 66
End: 2021-01-01
Payer: MEDICARE

## 2021-01-01 ENCOUNTER — INPATIENT (INPATIENT)
Facility: HOSPITAL | Age: 66
LOS: 0 days | Discharge: HOME | End: 2021-03-11
Attending: OTOLARYNGOLOGY | Admitting: OTOLARYNGOLOGY
Payer: MEDICARE

## 2021-01-01 ENCOUNTER — OUTPATIENT (OUTPATIENT)
Dept: OUTPATIENT SERVICES | Facility: HOSPITAL | Age: 66
LOS: 1 days | End: 2021-01-01
Payer: MEDICARE

## 2021-01-01 ENCOUNTER — APPOINTMENT (OUTPATIENT)
Dept: OTOLARYNGOLOGY | Facility: HOSPITAL | Age: 66
End: 2021-01-01

## 2021-01-01 ENCOUNTER — APPOINTMENT (OUTPATIENT)
Dept: OTOLARYNGOLOGY | Facility: CLINIC | Age: 66
End: 2021-01-01

## 2021-01-01 ENCOUNTER — OUTPATIENT (OUTPATIENT)
Dept: OUTPATIENT SERVICES | Facility: HOSPITAL | Age: 66
LOS: 1 days | Discharge: HOME | End: 2021-01-01

## 2021-01-01 ENCOUNTER — INPATIENT (INPATIENT)
Facility: HOSPITAL | Age: 66
LOS: 0 days | Discharge: HOME | DRG: 682 | End: 2021-04-10
Attending: HOSPITALIST | Admitting: FAMILY MEDICINE
Payer: MEDICARE

## 2021-01-01 ENCOUNTER — NON-APPOINTMENT (OUTPATIENT)
Age: 66
End: 2021-01-01

## 2021-01-01 ENCOUNTER — APPOINTMENT (OUTPATIENT)
Dept: DISASTER EMERGENCY | Facility: CLINIC | Age: 66
End: 2021-01-01

## 2021-01-01 ENCOUNTER — RESULT REVIEW (OUTPATIENT)
Age: 66
End: 2021-01-01

## 2021-01-01 ENCOUNTER — APPOINTMENT (OUTPATIENT)
Dept: INTERNAL MEDICINE | Facility: CLINIC | Age: 66
End: 2021-01-01
Payer: MEDICARE

## 2021-01-01 ENCOUNTER — APPOINTMENT (OUTPATIENT)
Dept: INTERNAL MEDICINE | Facility: CLINIC | Age: 66
End: 2021-01-01

## 2021-01-01 VITALS
RESPIRATION RATE: 18 BRPM | OXYGEN SATURATION: 96 % | WEIGHT: 116 LBS | DIASTOLIC BLOOD PRESSURE: 70 MMHG | TEMPERATURE: 98.6 F | BODY MASS INDEX: 17.18 KG/M2 | SYSTOLIC BLOOD PRESSURE: 124 MMHG | HEIGHT: 69 IN | HEART RATE: 96 BPM

## 2021-01-01 VITALS
HEART RATE: 106 BPM | DIASTOLIC BLOOD PRESSURE: 46 MMHG | OXYGEN SATURATION: 90 % | RESPIRATION RATE: 10 BRPM | SYSTOLIC BLOOD PRESSURE: 64 MMHG

## 2021-01-01 VITALS
SYSTOLIC BLOOD PRESSURE: 102 MMHG | BODY MASS INDEX: 17.48 KG/M2 | WEIGHT: 118 LBS | OXYGEN SATURATION: 95 % | HEART RATE: 83 BPM | DIASTOLIC BLOOD PRESSURE: 66 MMHG | HEIGHT: 69 IN | TEMPERATURE: 97.5 F

## 2021-01-01 VITALS
DIASTOLIC BLOOD PRESSURE: 67 MMHG | HEIGHT: 69 IN | OXYGEN SATURATION: 91 % | RESPIRATION RATE: 18 BRPM | HEART RATE: 90 BPM | TEMPERATURE: 98 F | SYSTOLIC BLOOD PRESSURE: 155 MMHG | WEIGHT: 115.08 LBS

## 2021-01-01 VITALS
OXYGEN SATURATION: 99 % | RESPIRATION RATE: 16 BRPM | WEIGHT: 121.92 LBS | DIASTOLIC BLOOD PRESSURE: 88 MMHG | HEART RATE: 79 BPM | HEIGHT: 66 IN | TEMPERATURE: 97 F | SYSTOLIC BLOOD PRESSURE: 133 MMHG

## 2021-01-01 VITALS
OXYGEN SATURATION: 97 % | SYSTOLIC BLOOD PRESSURE: 119 MMHG | HEIGHT: 66 IN | DIASTOLIC BLOOD PRESSURE: 66 MMHG | TEMPERATURE: 99 F | HEART RATE: 70 BPM | WEIGHT: 121.92 LBS | RESPIRATION RATE: 16 BRPM

## 2021-01-01 VITALS
OXYGEN SATURATION: 95 % | HEART RATE: 72 BPM | RESPIRATION RATE: 18 BRPM | SYSTOLIC BLOOD PRESSURE: 118 MMHG | DIASTOLIC BLOOD PRESSURE: 63 MMHG | TEMPERATURE: 99 F

## 2021-01-01 VITALS — HEART RATE: 96 BPM | TEMPERATURE: 97.1 F | SYSTOLIC BLOOD PRESSURE: 132 MMHG | DIASTOLIC BLOOD PRESSURE: 77 MMHG

## 2021-01-01 DIAGNOSIS — Z98.890 OTHER SPECIFIED POSTPROCEDURAL STATES: Chronic | ICD-10-CM

## 2021-01-01 DIAGNOSIS — Z85.118 PERSONAL HISTORY OF OTHER MALIGNANT NEOPLASM OF BRONCHUS AND LUNG: ICD-10-CM

## 2021-01-01 DIAGNOSIS — R74.01 ELEVATION OF LEVELS OF LIVER TRANSAMINASE LEVELS: ICD-10-CM

## 2021-01-01 DIAGNOSIS — U07.1 COVID-19: ICD-10-CM

## 2021-01-01 DIAGNOSIS — D64.9 ANEMIA, UNSPECIFIED: ICD-10-CM

## 2021-01-01 DIAGNOSIS — Z71.89 OTHER SPECIFIED COUNSELING: ICD-10-CM

## 2021-01-01 DIAGNOSIS — C06.2 MALIGNANT NEOPLASM OF RETROMOLAR AREA: ICD-10-CM

## 2021-01-01 DIAGNOSIS — F41.9 ANXIETY DISORDER, UNSPECIFIED: ICD-10-CM

## 2021-01-01 DIAGNOSIS — D72.829 ELEVATED WHITE BLOOD CELL COUNT, UNSPECIFIED: ICD-10-CM

## 2021-01-01 DIAGNOSIS — K55.9 VASCULAR DISORDER OF INTESTINE, UNSPECIFIED: ICD-10-CM

## 2021-01-01 DIAGNOSIS — J12.82 COVID-19: ICD-10-CM

## 2021-01-01 DIAGNOSIS — Z20.828 CONTACT WITH AND (SUSPECTED) EXPOSURE TO OTHER VIRAL COMMUNICABLE DISEASES: ICD-10-CM

## 2021-01-01 DIAGNOSIS — N17.9 ACUTE KIDNEY FAILURE, UNSPECIFIED: ICD-10-CM

## 2021-01-01 DIAGNOSIS — C34.92 MALIGNANT NEOPLASM OF UNSPECIFIED PART OF LEFT BRONCHUS OR LUNG: ICD-10-CM

## 2021-01-01 DIAGNOSIS — Z78.9 OTHER LONG TERM (CURRENT) DRUG THERAPY: ICD-10-CM

## 2021-01-01 DIAGNOSIS — R10.84 GENERALIZED ABDOMINAL PAIN: ICD-10-CM

## 2021-01-01 DIAGNOSIS — E87.5 HYPERKALEMIA: ICD-10-CM

## 2021-01-01 DIAGNOSIS — R77.8 OTHER SPECIFIED ABNORMALITIES OF PLASMA PROTEINS: ICD-10-CM

## 2021-01-01 DIAGNOSIS — Z01.818 ENCOUNTER FOR OTHER PREPROCEDURAL EXAMINATION: ICD-10-CM

## 2021-01-01 DIAGNOSIS — C76.0 MALIGNANT NEOPLASM OF HEAD, FACE AND NECK: ICD-10-CM

## 2021-01-01 DIAGNOSIS — Z79.899 OTHER LONG TERM (CURRENT) DRUG THERAPY: ICD-10-CM

## 2021-01-01 DIAGNOSIS — Z51.5 ENCOUNTER FOR PALLIATIVE CARE: ICD-10-CM

## 2021-01-01 DIAGNOSIS — K52.9 NONINFECTIVE GASTROENTERITIS AND COLITIS, UNSPECIFIED: ICD-10-CM

## 2021-01-01 DIAGNOSIS — T79.6XXA TRAUMATIC ISCHEMIA OF MUSCLE, INITIAL ENCOUNTER: ICD-10-CM

## 2021-01-01 DIAGNOSIS — J44.9 CHRONIC OBSTRUCTIVE PULMONARY DISEASE, UNSPECIFIED: ICD-10-CM

## 2021-01-01 DIAGNOSIS — Z01.812 ENCOUNTER FOR PREPROCEDURAL LABORATORY EXAMINATION: ICD-10-CM

## 2021-01-01 LAB
ACANTHOCYTES BLD QL SMEAR: SLIGHT — SIGNIFICANT CHANGE UP
ALBUMIN SERPL ELPH-MCNC: 3.1 G/DL — LOW (ref 3.3–5.2)
ALBUMIN SERPL ELPH-MCNC: 3.7 G/DL
ALBUMIN SERPL ELPH-MCNC: 3.8 G/DL — SIGNIFICANT CHANGE UP (ref 3.3–5)
ALBUMIN SERPL ELPH-MCNC: 3.9 G/DL — SIGNIFICANT CHANGE UP (ref 3.3–5.2)
ALP BLD-CCNC: 86 U/L
ALP SERPL-CCNC: 176 U/L — HIGH (ref 40–120)
ALP SERPL-CCNC: 278 U/L — HIGH (ref 40–120)
ALP SERPL-CCNC: 72 U/L — SIGNIFICANT CHANGE UP (ref 40–120)
ALT FLD-CCNC: 130 U/L — HIGH
ALT FLD-CCNC: 131 U/L — HIGH
ALT FLD-CCNC: 30 U/L — SIGNIFICANT CHANGE UP (ref 4–41)
ALT SERPL-CCNC: 29 U/L
ANION GAP SERPL CALC-SCNC: 11 MMOL/L — SIGNIFICANT CHANGE UP (ref 7–14)
ANION GAP SERPL CALC-SCNC: 13 MMOL/L
ANION GAP SERPL CALC-SCNC: 16 MMOL/L — SIGNIFICANT CHANGE UP (ref 5–17)
ANION GAP SERPL CALC-SCNC: 18 MMOL/L — HIGH (ref 5–17)
ANION GAP SERPL CALC-SCNC: 19 MMOL/L — HIGH (ref 5–17)
ANION GAP SERPL CALC-SCNC: 26 MMOL/L — HIGH (ref 5–17)
ANISOCYTOSIS BLD QL: SIGNIFICANT CHANGE UP
APPEARANCE UR: CLEAR — SIGNIFICANT CHANGE UP
APPEARANCE: CLEAR
APTT BLD: 30.5 SEC — SIGNIFICANT CHANGE UP (ref 27.5–35.5)
APTT BLD: 33.7 SEC — SIGNIFICANT CHANGE UP (ref 27.5–35.5)
AST SERPL-CCNC: 317 U/L — HIGH
AST SERPL-CCNC: 337 U/L — HIGH
AST SERPL-CCNC: 34 U/L — SIGNIFICANT CHANGE UP (ref 4–40)
AST SERPL-CCNC: 41 U/L
BACTERIA # UR AUTO: ABNORMAL
BACTERIA: NEGATIVE
BASOPHILS # BLD AUTO: 0 K/UL — SIGNIFICANT CHANGE UP (ref 0–0.2)
BASOPHILS # BLD AUTO: 0.04 K/UL
BASOPHILS # BLD AUTO: 0.13 K/UL — SIGNIFICANT CHANGE UP (ref 0–0.2)
BASOPHILS NFR BLD AUTO: 0 % — SIGNIFICANT CHANGE UP (ref 0–2)
BASOPHILS NFR BLD AUTO: 0.4 % — SIGNIFICANT CHANGE UP (ref 0–2)
BASOPHILS NFR BLD AUTO: 0.7 %
BILIRUB SERPL-MCNC: 0.2 MG/DL
BILIRUB SERPL-MCNC: 1.2 MG/DL — SIGNIFICANT CHANGE UP (ref 0.4–2)
BILIRUB SERPL-MCNC: 1.6 MG/DL — SIGNIFICANT CHANGE UP (ref 0.4–2)
BILIRUB SERPL-MCNC: <0.2 MG/DL — SIGNIFICANT CHANGE UP (ref 0.2–1.2)
BILIRUB UR-MCNC: NEGATIVE — SIGNIFICANT CHANGE UP
BILIRUBIN URINE: NEGATIVE
BLD GP AB SCN SERPL QL: SIGNIFICANT CHANGE UP
BLOOD URINE: NEGATIVE
BUN SERPL-MCNC: 17 MG/DL — SIGNIFICANT CHANGE UP (ref 7–23)
BUN SERPL-MCNC: 20 MG/DL
BUN SERPL-MCNC: 54 MG/DL — HIGH (ref 8–20)
BUN SERPL-MCNC: 57 MG/DL — HIGH (ref 8–20)
BUN SERPL-MCNC: 59 MG/DL — HIGH (ref 8–20)
BUN SERPL-MCNC: 63 MG/DL — HIGH (ref 8–20)
BURR CELLS BLD QL SMEAR: PRESENT — SIGNIFICANT CHANGE UP
CALCIUM SERPL-MCNC: 7.7 MG/DL — LOW (ref 8.6–10.2)
CALCIUM SERPL-MCNC: 7.8 MG/DL — LOW (ref 8.6–10.2)
CALCIUM SERPL-MCNC: 8.5 MG/DL — LOW (ref 8.6–10.2)
CALCIUM SERPL-MCNC: 8.7 MG/DL
CALCIUM SERPL-MCNC: 9.4 MG/DL — SIGNIFICANT CHANGE UP (ref 8.6–10.2)
CALCIUM SERPL-MCNC: 9.6 MG/DL — SIGNIFICANT CHANGE UP (ref 8.4–10.5)
CHLORIDE SERPL-SCNC: 100 MMOL/L
CHLORIDE SERPL-SCNC: 102 MMOL/L — SIGNIFICANT CHANGE UP (ref 98–107)
CHLORIDE SERPL-SCNC: 103 MMOL/L — SIGNIFICANT CHANGE UP (ref 98–107)
CHLORIDE SERPL-SCNC: 103 MMOL/L — SIGNIFICANT CHANGE UP (ref 98–107)
CHLORIDE SERPL-SCNC: 92 MMOL/L — LOW (ref 98–107)
CHLORIDE SERPL-SCNC: 99 MMOL/L — SIGNIFICANT CHANGE UP (ref 98–107)
CHOLEST SERPL-MCNC: 151 MG/DL
CK MB CFR SERPL CALC: 162.1 NG/ML — HIGH (ref 0–6.7)
CK SERPL-CCNC: 6842 U/L — HIGH (ref 30–200)
CO2 SERPL-SCNC: 14 MMOL/L — LOW (ref 22–29)
CO2 SERPL-SCNC: 19 MMOL/L — LOW (ref 22–29)
CO2 SERPL-SCNC: 21 MMOL/L — LOW (ref 22–29)
CO2 SERPL-SCNC: 22 MMOL/L — SIGNIFICANT CHANGE UP (ref 22–29)
CO2 SERPL-SCNC: 25 MMOL/L
CO2 SERPL-SCNC: 25 MMOL/L — SIGNIFICANT CHANGE UP (ref 22–31)
COLOR SPEC: YELLOW — SIGNIFICANT CHANGE UP
COLOR: NORMAL
CREAT SERPL-MCNC: 0.87 MG/DL — SIGNIFICANT CHANGE UP (ref 0.5–1.3)
CREAT SERPL-MCNC: 1.13 MG/DL
CREAT SERPL-MCNC: 2.43 MG/DL — HIGH (ref 0.5–1.3)
CREAT SERPL-MCNC: 2.44 MG/DL — HIGH (ref 0.5–1.3)
CREAT SERPL-MCNC: 2.61 MG/DL — HIGH (ref 0.5–1.3)
CREAT SERPL-MCNC: 2.65 MG/DL — HIGH (ref 0.5–1.3)
DIFF PNL FLD: ABNORMAL
EOSINOPHIL # BLD AUTO: 0 K/UL — SIGNIFICANT CHANGE UP (ref 0–0.5)
EOSINOPHIL # BLD AUTO: 0.05 K/UL — SIGNIFICANT CHANGE UP (ref 0–0.5)
EOSINOPHIL # BLD AUTO: 0.23 K/UL
EOSINOPHIL NFR BLD AUTO: 0 % — SIGNIFICANT CHANGE UP (ref 0–6)
EOSINOPHIL NFR BLD AUTO: 0.2 % — SIGNIFICANT CHANGE UP (ref 0–6)
EOSINOPHIL NFR BLD AUTO: 3.9 %
EPI CELLS # UR: SIGNIFICANT CHANGE UP
ESTIMATED AVERAGE GLUCOSE: 105 MG/DL
GAS PNL BLDA: SIGNIFICANT CHANGE UP
GLUCOSE BLDC GLUCOMTR-MCNC: 158 MG/DL — HIGH (ref 70–99)
GLUCOSE QUALITATIVE U: NEGATIVE
GLUCOSE SERPL-MCNC: 106 MG/DL — HIGH (ref 70–99)
GLUCOSE SERPL-MCNC: 114 MG/DL
GLUCOSE SERPL-MCNC: 119 MG/DL — HIGH (ref 70–99)
GLUCOSE SERPL-MCNC: 125 MG/DL — HIGH (ref 70–99)
GLUCOSE SERPL-MCNC: 74 MG/DL — SIGNIFICANT CHANGE UP (ref 70–99)
GLUCOSE SERPL-MCNC: 87 MG/DL — SIGNIFICANT CHANGE UP (ref 70–99)
GLUCOSE UR QL: NEGATIVE MG/DL — SIGNIFICANT CHANGE UP
HAV IGM SER-ACNC: SIGNIFICANT CHANGE UP
HBA1C MFR BLD HPLC: 5.3 %
HBV CORE IGM SER-ACNC: SIGNIFICANT CHANGE UP
HBV SURFACE AG SER-ACNC: SIGNIFICANT CHANGE UP
HCT VFR BLD CALC: 31.6 %
HCT VFR BLD CALC: 37.5 % — LOW (ref 39–50)
HCT VFR BLD CALC: 39.8 % — SIGNIFICANT CHANGE UP (ref 39–50)
HCV AB S/CO SERPL IA: 15.93 S/CO — HIGH (ref 0–0.99)
HCV AB SERPL-IMP: REACTIVE
HDLC SERPL-MCNC: 36 MG/DL
HGB BLD-MCNC: 10 G/DL
HGB BLD-MCNC: 12 G/DL — LOW (ref 13–17)
HGB BLD-MCNC: 12.3 G/DL — LOW (ref 13–17)
HYALINE CASTS: 0 /LPF
IMM GRANULOCYTES NFR BLD AUTO: 0.3 %
IMM GRANULOCYTES NFR BLD AUTO: 1.1 % — SIGNIFICANT CHANGE UP (ref 0–1.5)
INR BLD: 1.24 RATIO — HIGH (ref 0.88–1.16)
INR BLD: 1.44 RATIO — HIGH (ref 0.88–1.16)
IRON SERPL-MCNC: 55 UG/DL
KETONES UR-MCNC: NEGATIVE — SIGNIFICANT CHANGE UP
KETONES URINE: NEGATIVE
LACTATE BLDV-MCNC: 2.6 MMOL/L — HIGH (ref 0.5–2)
LACTATE BLDV-MCNC: 3 MMOL/L — HIGH (ref 0.5–2)
LACTATE BLDV-MCNC: 5.7 MMOL/L — CRITICAL HIGH (ref 0.5–2)
LDLC SERPL CALC-MCNC: 90 MG/DL
LEUKOCYTE ESTERASE UR-ACNC: NEGATIVE — SIGNIFICANT CHANGE UP
LEUKOCYTE ESTERASE URINE: NEGATIVE
LIDOCAIN IGE QN: 20 U/L — LOW (ref 22–51)
LYMPHOCYTES # BLD AUTO: 0.44 K/UL — LOW (ref 1–3.3)
LYMPHOCYTES # BLD AUTO: 1.02 K/UL — SIGNIFICANT CHANGE UP (ref 1–3.3)
LYMPHOCYTES # BLD AUTO: 1.4 % — LOW (ref 13–44)
LYMPHOCYTES # BLD AUTO: 1.5 K/UL
LYMPHOCYTES # BLD AUTO: 2.6 % — LOW (ref 13–44)
LYMPHOCYTES NFR BLD AUTO: 25.2 %
MACROCYTES BLD QL: SIGNIFICANT CHANGE UP
MAGNESIUM SERPL-MCNC: 2.3 MG/DL — SIGNIFICANT CHANGE UP (ref 1.6–2.6)
MAN DIFF?: NORMAL
MANUAL SMEAR VERIFICATION: SIGNIFICANT CHANGE UP
MCHC RBC-ENTMCNC: 30.9 GM/DL — LOW (ref 32–36)
MCHC RBC-ENTMCNC: 31.6 GM/DL
MCHC RBC-ENTMCNC: 32 GM/DL — SIGNIFICANT CHANGE UP (ref 32–36)
MCHC RBC-ENTMCNC: 32.1 PG — SIGNIFICANT CHANGE UP (ref 27–34)
MCHC RBC-ENTMCNC: 32.2 PG — SIGNIFICANT CHANGE UP (ref 27–34)
MCHC RBC-ENTMCNC: 32.8 PG
MCV RBC AUTO: 100.5 FL — HIGH (ref 80–100)
MCV RBC AUTO: 103.6 FL
MCV RBC AUTO: 103.9 FL — HIGH (ref 80–100)
MICROSCOPIC-UA: NORMAL
MONOCYTES # BLD AUTO: 0.35 K/UL — SIGNIFICANT CHANGE UP (ref 0–0.9)
MONOCYTES # BLD AUTO: 0.65 K/UL
MONOCYTES # BLD AUTO: 0.82 K/UL — SIGNIFICANT CHANGE UP (ref 0–0.9)
MONOCYTES NFR BLD AUTO: 0.9 % — LOW (ref 2–14)
MONOCYTES NFR BLD AUTO: 10.9 %
MONOCYTES NFR BLD AUTO: 2.5 % — SIGNIFICANT CHANGE UP (ref 2–14)
NEUTROPHILS # BLD AUTO: 3.52 K/UL
NEUTROPHILS # BLD AUTO: 30.53 K/UL — HIGH (ref 1.8–7.4)
NEUTROPHILS # BLD AUTO: 37.91 K/UL — HIGH (ref 1.8–7.4)
NEUTROPHILS NFR BLD AUTO: 59 %
NEUTROPHILS NFR BLD AUTO: 94.4 % — HIGH (ref 43–77)
NEUTROPHILS NFR BLD AUTO: 96.5 % — HIGH (ref 43–77)
NITRITE UR-MCNC: NEGATIVE — SIGNIFICANT CHANGE UP
NITRITE URINE: NEGATIVE
NONHDLC SERPL-MCNC: 115 MG/DL
NT-PROBNP SERPL-SCNC: 4599 PG/ML — HIGH (ref 0–300)
OVALOCYTES BLD QL SMEAR: SLIGHT — SIGNIFICANT CHANGE UP
PH UR: 5 — SIGNIFICANT CHANGE UP (ref 5–8)
PH URINE: 6.5
PHOSPHATE SERPL-MCNC: 5.4 MG/DL — HIGH (ref 2.4–4.7)
PLAT MORPH BLD: NORMAL — SIGNIFICANT CHANGE UP
PLATELET # BLD AUTO: 165 K/UL — SIGNIFICANT CHANGE UP (ref 150–400)
PLATELET # BLD AUTO: 248 K/UL — SIGNIFICANT CHANGE UP (ref 150–400)
PLATELET # BLD AUTO: 266 K/UL
POIKILOCYTOSIS BLD QL AUTO: SIGNIFICANT CHANGE UP
POLYCHROMASIA BLD QL SMEAR: SLIGHT — SIGNIFICANT CHANGE UP
POTASSIUM SERPL-MCNC: 4.5 MMOL/L — SIGNIFICANT CHANGE UP (ref 3.5–5.3)
POTASSIUM SERPL-MCNC: 4.6 MMOL/L — SIGNIFICANT CHANGE UP (ref 3.5–5.3)
POTASSIUM SERPL-MCNC: 5.1 MMOL/L — SIGNIFICANT CHANGE UP (ref 3.5–5.3)
POTASSIUM SERPL-MCNC: 6.3 MMOL/L — CRITICAL HIGH (ref 3.5–5.3)
POTASSIUM SERPL-MCNC: 6.5 MMOL/L — CRITICAL HIGH (ref 3.5–5.3)
POTASSIUM SERPL-SCNC: 4.5 MMOL/L — SIGNIFICANT CHANGE UP (ref 3.5–5.3)
POTASSIUM SERPL-SCNC: 4.6 MMOL/L — SIGNIFICANT CHANGE UP (ref 3.5–5.3)
POTASSIUM SERPL-SCNC: 4.8 MMOL/L
POTASSIUM SERPL-SCNC: 5.1 MMOL/L — SIGNIFICANT CHANGE UP (ref 3.5–5.3)
POTASSIUM SERPL-SCNC: 6.3 MMOL/L — CRITICAL HIGH (ref 3.5–5.3)
POTASSIUM SERPL-SCNC: 6.5 MMOL/L — CRITICAL HIGH (ref 3.5–5.3)
PROT SERPL-MCNC: 5.9 G/DL — LOW (ref 6.6–8.7)
PROT SERPL-MCNC: 6.6 G/DL
PROT SERPL-MCNC: 7.3 G/DL — SIGNIFICANT CHANGE UP (ref 6–8.3)
PROT SERPL-MCNC: 7.6 G/DL — SIGNIFICANT CHANGE UP (ref 6.6–8.7)
PROT UR-MCNC: 30 MG/DL
PROTEIN URINE: NEGATIVE
PROTHROM AB SERPL-ACNC: 14.2 SEC — HIGH (ref 10.6–13.6)
PROTHROM AB SERPL-ACNC: 16.4 SEC — HIGH (ref 10.6–13.6)
PSA SERPL-MCNC: 0.19 NG/ML
RBC # BLD: 3.05 M/UL
RBC # BLD: 3.73 M/UL — LOW (ref 4.2–5.8)
RBC # BLD: 3.83 M/UL — LOW (ref 4.2–5.8)
RBC # FLD: 13.7 % — SIGNIFICANT CHANGE UP (ref 10.3–14.5)
RBC # FLD: 13.7 % — SIGNIFICANT CHANGE UP (ref 10.3–14.5)
RBC # FLD: 14.4 %
RBC BLD AUTO: ABNORMAL
RBC CASTS # UR COMP ASSIST: ABNORMAL /HPF (ref 0–4)
RED BLOOD CELLS URINE: 0 /HPF
SARS-COV-2 N GENE NPH QL NAA+PROBE: NOT DETECTED
SARS-COV-2 RNA SPEC QL NAA+PROBE: SIGNIFICANT CHANGE UP
SMUDGE CELLS # BLD: PRESENT — SIGNIFICANT CHANGE UP
SODIUM SERPL-SCNC: 132 MMOL/L — LOW (ref 135–145)
SODIUM SERPL-SCNC: 137 MMOL/L — SIGNIFICANT CHANGE UP (ref 135–145)
SODIUM SERPL-SCNC: 138 MMOL/L
SODIUM SERPL-SCNC: 139 MMOL/L — SIGNIFICANT CHANGE UP (ref 135–145)
SODIUM SERPL-SCNC: 140 MMOL/L — SIGNIFICANT CHANGE UP (ref 135–145)
SODIUM SERPL-SCNC: 142 MMOL/L — SIGNIFICANT CHANGE UP (ref 135–145)
SP GR SPEC: 1.01 — SIGNIFICANT CHANGE UP (ref 1.01–1.02)
SPECIFIC GRAVITY URINE: 1.01
SQUAMOUS EPITHELIAL CELLS: 0 /HPF
SURGICAL PATHOLOGY STUDY: SIGNIFICANT CHANGE UP
TRIGL SERPL-MCNC: 123 MG/DL
TROPONIN T SERPL-MCNC: 0.1 NG/ML — HIGH (ref 0–0.06)
TROPONIN T SERPL-MCNC: 0.17 NG/ML — HIGH (ref 0–0.06)
TSH SERPL-ACNC: 2.35 UIU/ML
UROBILINOGEN FLD QL: NEGATIVE MG/DL — SIGNIFICANT CHANGE UP
UROBILINOGEN URINE: NORMAL
WBC # BLD: 32.33 K/UL — HIGH (ref 3.8–10.5)
WBC # BLD: 39.28 K/UL — HIGH (ref 3.8–10.5)
WBC # FLD AUTO: 32.33 K/UL — HIGH (ref 3.8–10.5)
WBC # FLD AUTO: 39.28 K/UL — HIGH (ref 3.8–10.5)
WBC # FLD AUTO: 5.96 K/UL
WBC UR QL: SIGNIFICANT CHANGE UP
WHITE BLOOD CELLS URINE: 0 /HPF

## 2021-01-01 PROCEDURE — 90715 TDAP VACCINE 7 YRS/> IM: CPT

## 2021-01-01 PROCEDURE — 71250 CT THORAX DX C-: CPT

## 2021-01-01 PROCEDURE — 99232 SBSQ HOSP IP/OBS MODERATE 35: CPT

## 2021-01-01 PROCEDURE — 99285 EMERGENCY DEPT VISIT HI MDM: CPT | Mod: 25

## 2021-01-01 PROCEDURE — 36600 WITHDRAWAL OF ARTERIAL BLOOD: CPT

## 2021-01-01 PROCEDURE — 74176 CT ABD & PELVIS W/O CONTRAST: CPT | Mod: 26,ME

## 2021-01-01 PROCEDURE — 70486 CT MAXILLOFACIAL W/O DYE: CPT | Mod: 26,MG

## 2021-01-01 PROCEDURE — 83690 ASSAY OF LIPASE: CPT

## 2021-01-01 PROCEDURE — 82962 GLUCOSE BLOOD TEST: CPT

## 2021-01-01 PROCEDURE — 31575 DIAGNOSTIC LARYNGOSCOPY: CPT

## 2021-01-01 PROCEDURE — 99072 ADDL SUPL MATRL&STAF TM PHE: CPT

## 2021-01-01 PROCEDURE — 74176 CT ABD & PELVIS W/O CONTRAST: CPT

## 2021-01-01 PROCEDURE — 72125 CT NECK SPINE W/O DYE: CPT | Mod: 26,MG

## 2021-01-01 PROCEDURE — 82553 CREATINE MB FRACTION: CPT

## 2021-01-01 PROCEDURE — 78815 PET IMAGE W/CT SKULL-THIGH: CPT | Mod: 26,PS

## 2021-01-01 PROCEDURE — 99221 1ST HOSP IP/OBS SF/LOW 40: CPT

## 2021-01-01 PROCEDURE — 83735 ASSAY OF MAGNESIUM: CPT

## 2021-01-01 PROCEDURE — 71045 X-RAY EXAM CHEST 1 VIEW: CPT

## 2021-01-01 PROCEDURE — 96375 TX/PRO/DX INJ NEW DRUG ADDON: CPT

## 2021-01-01 PROCEDURE — 84484 ASSAY OF TROPONIN QUANT: CPT

## 2021-01-01 PROCEDURE — 85610 PROTHROMBIN TIME: CPT

## 2021-01-01 PROCEDURE — 84295 ASSAY OF SERUM SODIUM: CPT

## 2021-01-01 PROCEDURE — 99497 ADVNCD CARE PLAN 30 MIN: CPT

## 2021-01-01 PROCEDURE — 82803 BLOOD GASES ANY COMBINATION: CPT

## 2021-01-01 PROCEDURE — U0005: CPT

## 2021-01-01 PROCEDURE — 87521 HEPATITIS C PROBE&RVRS TRNSC: CPT

## 2021-01-01 PROCEDURE — 36415 COLL VENOUS BLD VENIPUNCTURE: CPT

## 2021-01-01 PROCEDURE — 94640 AIRWAY INHALATION TREATMENT: CPT

## 2021-01-01 PROCEDURE — 70450 CT HEAD/BRAIN W/O DYE: CPT | Mod: 26,ME

## 2021-01-01 PROCEDURE — 85730 THROMBOPLASTIN TIME PARTIAL: CPT

## 2021-01-01 PROCEDURE — 70450 CT HEAD/BRAIN W/O DYE: CPT

## 2021-01-01 PROCEDURE — 83880 ASSAY OF NATRIURETIC PEPTIDE: CPT

## 2021-01-01 PROCEDURE — 86901 BLOOD TYPING SEROLOGIC RH(D): CPT

## 2021-01-01 PROCEDURE — 99233 SBSQ HOSP IP/OBS HIGH 50: CPT

## 2021-01-01 PROCEDURE — 94729 DIFFUSING CAPACITY: CPT

## 2021-01-01 PROCEDURE — 85018 HEMOGLOBIN: CPT

## 2021-01-01 PROCEDURE — 83605 ASSAY OF LACTIC ACID: CPT

## 2021-01-01 PROCEDURE — 71250 CT THORAX DX C-: CPT | Mod: 26

## 2021-01-01 PROCEDURE — 99239 HOSP IP/OBS DSCHRG MGMT >30: CPT

## 2021-01-01 PROCEDURE — 93010 ELECTROCARDIOGRAM REPORT: CPT

## 2021-01-01 PROCEDURE — 87040 BLOOD CULTURE FOR BACTERIA: CPT

## 2021-01-01 PROCEDURE — 99222 1ST HOSP IP/OBS MODERATE 55: CPT

## 2021-01-01 PROCEDURE — 99215 OFFICE O/P EST HI 40 MIN: CPT | Mod: 95

## 2021-01-01 PROCEDURE — 90471 IMMUNIZATION ADMIN: CPT

## 2021-01-01 PROCEDURE — 99223 1ST HOSP IP/OBS HIGH 75: CPT

## 2021-01-01 PROCEDURE — A9552: CPT

## 2021-01-01 PROCEDURE — 80074 ACUTE HEPATITIS PANEL: CPT

## 2021-01-01 PROCEDURE — 80053 COMPREHEN METABOLIC PANEL: CPT

## 2021-01-01 PROCEDURE — U0003: CPT

## 2021-01-01 PROCEDURE — 82550 ASSAY OF CK (CPK): CPT

## 2021-01-01 PROCEDURE — 82435 ASSAY OF BLOOD CHLORIDE: CPT

## 2021-01-01 PROCEDURE — 71045 X-RAY EXAM CHEST 1 VIEW: CPT | Mod: 26

## 2021-01-01 PROCEDURE — 96365 THER/PROPH/DIAG IV INF INIT: CPT

## 2021-01-01 PROCEDURE — 84132 ASSAY OF SERUM POTASSIUM: CPT

## 2021-01-01 PROCEDURE — 86850 RBC ANTIBODY SCREEN: CPT

## 2021-01-01 PROCEDURE — G1004: CPT

## 2021-01-01 PROCEDURE — 93005 ELECTROCARDIOGRAM TRACING: CPT

## 2021-01-01 PROCEDURE — 80048 BASIC METABOLIC PNL TOTAL CA: CPT

## 2021-01-01 PROCEDURE — 72125 CT NECK SPINE W/O DYE: CPT

## 2021-01-01 PROCEDURE — 78815 PET IMAGE W/CT SKULL-THIGH: CPT

## 2021-01-01 PROCEDURE — 99498 ADVNCD CARE PLAN ADDL 30 MIN: CPT

## 2021-01-01 PROCEDURE — 99214 OFFICE O/P EST MOD 30 MIN: CPT | Mod: 25

## 2021-01-01 PROCEDURE — 94010 BREATHING CAPACITY TEST: CPT

## 2021-01-01 PROCEDURE — 70486 CT MAXILLOFACIAL W/O DYE: CPT

## 2021-01-01 PROCEDURE — 86900 BLOOD TYPING SEROLOGIC ABO: CPT

## 2021-01-01 PROCEDURE — 94727 GAS DIL/WSHOT DETER LNG VOL: CPT

## 2021-01-01 PROCEDURE — 84100 ASSAY OF PHOSPHORUS: CPT

## 2021-01-01 PROCEDURE — 81001 URINALYSIS AUTO W/SCOPE: CPT

## 2021-01-01 PROCEDURE — 88305 TISSUE EXAM BY PATHOLOGIST: CPT | Mod: 26

## 2021-01-01 PROCEDURE — 82330 ASSAY OF CALCIUM: CPT

## 2021-01-01 PROCEDURE — 82947 ASSAY GLUCOSE BLOOD QUANT: CPT

## 2021-01-01 PROCEDURE — 85025 COMPLETE CBC W/AUTO DIFF WBC: CPT

## 2021-01-01 PROCEDURE — 85014 HEMATOCRIT: CPT

## 2021-01-01 RX ORDER — BUDESONIDE AND FORMOTEROL FUMARATE DIHYDRATE 160; 4.5 UG/1; UG/1
2 AEROSOL RESPIRATORY (INHALATION)
Qty: 0 | Refills: 0 | DISCHARGE

## 2021-01-01 RX ORDER — OXYCODONE HYDROCHLORIDE 5 MG/5ML
5 SOLUTION ORAL EVERY 6 HOURS
Qty: 140 | Refills: 0 | Status: ACTIVE | COMMUNITY
Start: 2021-01-01 | End: 1900-01-01

## 2021-01-01 RX ORDER — SODIUM BICARBONATE 1 MEQ/ML
50 SYRINGE (ML) INTRAVENOUS ONCE
Refills: 0 | Status: COMPLETED | OUTPATIENT
Start: 2021-01-01 | End: 2021-01-01

## 2021-01-01 RX ORDER — HYDROMORPHONE HYDROCHLORIDE 2 MG/ML
0.5 INJECTION INTRAMUSCULAR; INTRAVENOUS; SUBCUTANEOUS EVERY 4 HOURS
Refills: 0 | Status: DISCONTINUED | OUTPATIENT
Start: 2021-01-01 | End: 2021-01-01

## 2021-01-01 RX ORDER — VANCOMYCIN HCL 1 G
500 VIAL (EA) INTRAVENOUS ONCE
Refills: 0 | Status: COMPLETED | OUTPATIENT
Start: 2021-01-01 | End: 2021-01-01

## 2021-01-01 RX ORDER — FLUTICASONE FUROATE AND VILANTEROL TRIFENATATE 100; 25 UG/1; UG/1
1 POWDER RESPIRATORY (INHALATION)
Qty: 0 | Refills: 0 | DISCHARGE

## 2021-01-01 RX ORDER — ALBUTEROL 90 UG/1
2 AEROSOL, METERED ORAL
Qty: 0 | Refills: 0 | DISCHARGE

## 2021-01-01 RX ORDER — OXYCODONE HYDROCHLORIDE 5 MG/1
5 TABLET ORAL ONCE
Refills: 0 | Status: DISCONTINUED | OUTPATIENT
Start: 2021-01-01 | End: 2021-01-01

## 2021-01-01 RX ORDER — SODIUM CHLORIDE 9 MG/ML
1000 INJECTION, SOLUTION INTRAVENOUS
Refills: 0 | Status: DISCONTINUED | OUTPATIENT
Start: 2021-01-01 | End: 2021-01-01

## 2021-01-01 RX ORDER — SODIUM CHLORIDE 9 MG/ML
1000 INJECTION INTRAMUSCULAR; INTRAVENOUS; SUBCUTANEOUS ONCE
Refills: 0 | Status: COMPLETED | OUTPATIENT
Start: 2021-01-01 | End: 2021-01-01

## 2021-01-01 RX ORDER — PIPERACILLIN AND TAZOBACTAM 4; .5 G/20ML; G/20ML
INJECTION, POWDER, LYOPHILIZED, FOR SOLUTION INTRAVENOUS
Refills: 0 | Status: DISCONTINUED | OUTPATIENT
Start: 2021-01-01 | End: 2021-01-01

## 2021-01-01 RX ORDER — CEFTRIAXONE 500 MG/1
1000 INJECTION, POWDER, FOR SOLUTION INTRAMUSCULAR; INTRAVENOUS ONCE
Refills: 0 | Status: COMPLETED | OUTPATIENT
Start: 2021-01-01 | End: 2021-01-01

## 2021-01-01 RX ORDER — HYDROMORPHONE HYDROCHLORIDE 2 MG/ML
6 INJECTION INTRAMUSCULAR; INTRAVENOUS; SUBCUTANEOUS ONCE
Refills: 0 | Status: COMPLETED | OUTPATIENT
Start: 2021-01-01 | End: 2021-01-01

## 2021-01-01 RX ORDER — SODIUM ZIRCONIUM CYCLOSILICATE 10 G/10G
10 POWDER, FOR SUSPENSION ORAL ONCE
Refills: 0 | Status: COMPLETED | OUTPATIENT
Start: 2021-01-01 | End: 2021-01-01

## 2021-01-01 RX ORDER — MORPHINE SULFATE 50 MG/1
2 CAPSULE, EXTENDED RELEASE ORAL ONCE
Refills: 0 | Status: DISCONTINUED | OUTPATIENT
Start: 2021-01-01 | End: 2021-01-01

## 2021-01-01 RX ORDER — ONDANSETRON 8 MG/1
4 TABLET, FILM COATED ORAL ONCE
Refills: 0 | Status: COMPLETED | OUTPATIENT
Start: 2021-01-01 | End: 2021-01-01

## 2021-01-01 RX ORDER — PANTOPRAZOLE SODIUM 20 MG/1
40 TABLET, DELAYED RELEASE ORAL DAILY
Refills: 0 | Status: DISCONTINUED | OUTPATIENT
Start: 2021-01-01 | End: 2021-01-01

## 2021-01-01 RX ORDER — ALBUTEROL 90 UG/1
2.5 AEROSOL, METERED ORAL EVERY 6 HOURS
Refills: 0 | Status: DISCONTINUED | OUTPATIENT
Start: 2021-01-01 | End: 2021-01-01

## 2021-01-01 RX ORDER — MORPHINE SULFATE 50 MG/1
6 CAPSULE, EXTENDED RELEASE ORAL
Qty: 100 | Refills: 0 | Status: DISCONTINUED | OUTPATIENT
Start: 2021-01-01 | End: 2021-01-01

## 2021-01-01 RX ORDER — INFLUENZA VIRUS VACCINE 15; 15; 15; 15 UG/.5ML; UG/.5ML; UG/.5ML; UG/.5ML
0.5 SUSPENSION INTRAMUSCULAR ONCE
Refills: 0 | Status: DISCONTINUED | OUTPATIENT
Start: 2021-01-01 | End: 2021-01-01

## 2021-01-01 RX ORDER — ACETAMINOPHEN 500 MG
20.31 TABLET ORAL
Qty: 243.72 | Refills: 0
Start: 2021-01-01 | End: 2021-01-01

## 2021-01-01 RX ORDER — INFLUENZA VIRUS VACCINE 15; 15; 15; 15 UG/.5ML; UG/.5ML; UG/.5ML; UG/.5ML
0.7 SUSPENSION INTRAMUSCULAR ONCE
Refills: 0 | Status: DISCONTINUED | OUTPATIENT
Start: 2021-01-01 | End: 2021-01-01

## 2021-01-01 RX ORDER — BUDESONIDE AND FORMOTEROL FUMARATE DIHYDRATE 160; 4.5 UG/1; UG/1
2 AEROSOL RESPIRATORY (INHALATION)
Refills: 0 | Status: DISCONTINUED | OUTPATIENT
Start: 2021-01-01 | End: 2021-01-01

## 2021-01-01 RX ORDER — ALBUTEROL 90 UG/1
2 AEROSOL, METERED ORAL EVERY 6 HOURS
Refills: 0 | Status: DISCONTINUED | OUTPATIENT
Start: 2021-01-01 | End: 2021-01-01

## 2021-01-01 RX ORDER — DEXTROSE 50 % IN WATER 50 %
50 SYRINGE (ML) INTRAVENOUS ONCE
Refills: 0 | Status: COMPLETED | OUTPATIENT
Start: 2021-01-01 | End: 2021-01-01

## 2021-01-01 RX ORDER — HEPARIN SODIUM 5000 [USP'U]/ML
5000 INJECTION INTRAVENOUS; SUBCUTANEOUS EVERY 8 HOURS
Refills: 0 | Status: DISCONTINUED | OUTPATIENT
Start: 2021-01-01 | End: 2021-01-01

## 2021-01-01 RX ORDER — LACTOBACILLUS ACIDOPHILUS 100MM CELL
1 CAPSULE ORAL
Refills: 0 | Status: DISCONTINUED | OUTPATIENT
Start: 2021-01-01 | End: 2021-01-01

## 2021-01-01 RX ORDER — ONDANSETRON 8 MG/1
4 TABLET, FILM COATED ORAL EVERY 6 HOURS
Refills: 0 | Status: DISCONTINUED | OUTPATIENT
Start: 2021-01-01 | End: 2021-01-01

## 2021-01-01 RX ORDER — TETANUS TOXOID, REDUCED DIPHTHERIA TOXOID AND ACELLULAR PERTUSSIS VACCINE, ADSORBED 5; 2.5; 8; 8; 2.5 [IU]/.5ML; [IU]/.5ML; UG/.5ML; UG/.5ML; UG/.5ML
0.5 SUSPENSION INTRAMUSCULAR ONCE
Refills: 0 | Status: COMPLETED | OUTPATIENT
Start: 2021-01-01 | End: 2021-01-01

## 2021-01-01 RX ORDER — HYDROMORPHONE HYDROCHLORIDE 2 MG/ML
2 INJECTION INTRAMUSCULAR; INTRAVENOUS; SUBCUTANEOUS
Refills: 0 | Status: DISCONTINUED | OUTPATIENT
Start: 2021-01-01 | End: 2021-01-01

## 2021-01-01 RX ORDER — HYDROMORPHONE HYDROCHLORIDE 2 MG/ML
0.5 INJECTION INTRAMUSCULAR; INTRAVENOUS; SUBCUTANEOUS EVERY 6 HOURS
Refills: 0 | Status: DISCONTINUED | OUTPATIENT
Start: 2021-01-01 | End: 2021-01-01

## 2021-01-01 RX ORDER — ONDANSETRON 8 MG/1
4 TABLET, FILM COATED ORAL ONCE
Refills: 0 | Status: DISCONTINUED | OUTPATIENT
Start: 2021-01-01 | End: 2021-01-01

## 2021-01-01 RX ORDER — PIPERACILLIN AND TAZOBACTAM 4; .5 G/20ML; G/20ML
3.38 INJECTION, POWDER, LYOPHILIZED, FOR SOLUTION INTRAVENOUS ONCE
Refills: 0 | Status: COMPLETED | OUTPATIENT
Start: 2021-01-01 | End: 2021-01-01

## 2021-01-01 RX ORDER — DEXAMETHASONE 0.5 MG/5ML
6 ELIXIR ORAL ONCE
Refills: 0 | Status: COMPLETED | OUTPATIENT
Start: 2021-01-01 | End: 2021-01-01

## 2021-01-01 RX ORDER — HYDROMORPHONE HYDROCHLORIDE 2 MG/ML
1 INJECTION INTRAMUSCULAR; INTRAVENOUS; SUBCUTANEOUS
Refills: 0 | Status: DISCONTINUED | OUTPATIENT
Start: 2021-01-01 | End: 2021-01-01

## 2021-01-01 RX ORDER — CALCIUM CHLORIDE
1000 POWDER (GRAM) MISCELLANEOUS ONCE
Refills: 0 | Status: COMPLETED | OUTPATIENT
Start: 2021-01-01 | End: 2021-01-01

## 2021-01-01 RX ORDER — INSULIN HUMAN 100 [IU]/ML
10 INJECTION, SOLUTION SUBCUTANEOUS ONCE
Refills: 0 | Status: COMPLETED | OUTPATIENT
Start: 2021-01-01 | End: 2021-01-01

## 2021-01-01 RX ORDER — IPRATROPIUM/ALBUTEROL SULFATE 18-103MCG
3 AEROSOL WITH ADAPTER (GRAM) INHALATION EVERY 6 HOURS
Refills: 0 | Status: DISCONTINUED | OUTPATIENT
Start: 2021-01-01 | End: 2021-01-01

## 2021-01-01 RX ORDER — ACETAMINOPHEN 500 MG
650 TABLET ORAL EVERY 6 HOURS
Refills: 0 | Status: DISCONTINUED | OUTPATIENT
Start: 2021-01-01 | End: 2021-01-01

## 2021-01-01 RX ORDER — PIPERACILLIN AND TAZOBACTAM 4; .5 G/20ML; G/20ML
3.38 INJECTION, POWDER, LYOPHILIZED, FOR SOLUTION INTRAVENOUS EVERY 12 HOURS
Refills: 0 | Status: DISCONTINUED | OUTPATIENT
Start: 2021-01-01 | End: 2021-01-01

## 2021-01-01 RX ORDER — HYDROMORPHONE HYDROCHLORIDE 2 MG/ML
3 INJECTION INTRAMUSCULAR; INTRAVENOUS; SUBCUTANEOUS
Qty: 100 | Refills: 0 | Status: DISCONTINUED | OUTPATIENT
Start: 2021-01-01 | End: 2021-01-01

## 2021-01-01 RX ORDER — SODIUM ZIRCONIUM CYCLOSILICATE 10 G/10G
5 POWDER, FOR SUSPENSION ORAL ONCE
Refills: 0 | Status: DISCONTINUED | OUTPATIENT
Start: 2021-01-01 | End: 2021-01-01

## 2021-01-01 RX ORDER — HYDROMORPHONE HYDROCHLORIDE 2 MG/ML
1 INJECTION INTRAMUSCULAR; INTRAVENOUS; SUBCUTANEOUS EVERY 4 HOURS
Refills: 0 | Status: DISCONTINUED | OUTPATIENT
Start: 2021-01-01 | End: 2021-01-01

## 2021-01-01 RX ORDER — SODIUM CHLORIDE 9 MG/ML
500 INJECTION INTRAMUSCULAR; INTRAVENOUS; SUBCUTANEOUS ONCE
Refills: 0 | Status: COMPLETED | OUTPATIENT
Start: 2021-01-01 | End: 2021-01-01

## 2021-01-01 RX ORDER — THIAMINE MONONITRATE (VIT B1) 100 MG
50 TABLET ORAL ONCE
Refills: 0 | Status: COMPLETED | OUTPATIENT
Start: 2021-01-01 | End: 2021-01-01

## 2021-01-01 RX ORDER — FENTANYL CITRATE 50 UG/ML
25 INJECTION INTRAVENOUS
Refills: 0 | Status: DISCONTINUED | OUTPATIENT
Start: 2021-01-01 | End: 2021-01-01

## 2021-01-01 RX ADMIN — Medication 3 MILLILITER(S): at 08:45

## 2021-01-01 RX ADMIN — SODIUM CHLORIDE 1000 MILLILITER(S): 9 INJECTION INTRAMUSCULAR; INTRAVENOUS; SUBCUTANEOUS at 15:20

## 2021-01-01 RX ADMIN — MORPHINE SULFATE 6 MG/HR: 50 CAPSULE, EXTENDED RELEASE ORAL at 10:28

## 2021-01-01 RX ADMIN — PIPERACILLIN AND TAZOBACTAM 25 GRAM(S): 4; .5 INJECTION, POWDER, LYOPHILIZED, FOR SOLUTION INTRAVENOUS at 20:27

## 2021-01-01 RX ADMIN — Medication 650 MILLIGRAM(S): at 11:04

## 2021-01-01 RX ADMIN — ONDANSETRON 4 MILLIGRAM(S): 8 TABLET, FILM COATED ORAL at 15:15

## 2021-01-01 RX ADMIN — HYDROMORPHONE HYDROCHLORIDE 2 MILLIGRAM(S): 2 INJECTION INTRAMUSCULAR; INTRAVENOUS; SUBCUTANEOUS at 08:00

## 2021-01-01 RX ADMIN — Medication 650 MILLIGRAM(S): at 10:34

## 2021-01-01 RX ADMIN — SODIUM CHLORIDE 500 MILLILITER(S): 9 INJECTION INTRAMUSCULAR; INTRAVENOUS; SUBCUTANEOUS at 03:35

## 2021-01-01 RX ADMIN — Medication 50 MILLIGRAM(S): at 17:27

## 2021-01-01 RX ADMIN — SODIUM CHLORIDE 75 MILLILITER(S): 9 INJECTION, SOLUTION INTRAVENOUS at 20:45

## 2021-01-01 RX ADMIN — HYDROMORPHONE HYDROCHLORIDE 0.5 MILLIGRAM(S): 2 INJECTION INTRAMUSCULAR; INTRAVENOUS; SUBCUTANEOUS at 04:14

## 2021-01-01 RX ADMIN — HYDROMORPHONE HYDROCHLORIDE 0.5 MILLIGRAM(S): 2 INJECTION INTRAMUSCULAR; INTRAVENOUS; SUBCUTANEOUS at 00:17

## 2021-01-01 RX ADMIN — Medication 6 MILLIGRAM(S): at 20:28

## 2021-01-01 RX ADMIN — INSULIN HUMAN 10 UNIT(S): 100 INJECTION, SOLUTION SUBCUTANEOUS at 18:47

## 2021-01-01 RX ADMIN — Medication 1000 MILLIGRAM(S): at 17:06

## 2021-01-01 RX ADMIN — Medication 50 MILLILITER(S): at 18:44

## 2021-01-01 RX ADMIN — SODIUM CHLORIDE 150 MILLILITER(S): 9 INJECTION, SOLUTION INTRAVENOUS at 03:35

## 2021-01-01 RX ADMIN — TETANUS TOXOID, REDUCED DIPHTHERIA TOXOID AND ACELLULAR PERTUSSIS VACCINE, ADSORBED 0.5 MILLILITER(S): 5; 2.5; 8; 8; 2.5 SUSPENSION INTRAMUSCULAR at 15:16

## 2021-01-01 RX ADMIN — MORPHINE SULFATE 2 MILLIGRAM(S): 50 CAPSULE, EXTENDED RELEASE ORAL at 03:35

## 2021-01-01 RX ADMIN — Medication 100 MILLIGRAM(S): at 00:13

## 2021-01-01 RX ADMIN — MORPHINE SULFATE 6 MG/HR: 50 CAPSULE, EXTENDED RELEASE ORAL at 09:57

## 2021-01-01 RX ADMIN — Medication 3 MILLILITER(S): at 15:12

## 2021-01-01 RX ADMIN — HYDROMORPHONE HYDROCHLORIDE 3 MG/HR: 2 INJECTION INTRAMUSCULAR; INTRAVENOUS; SUBCUTANEOUS at 12:00

## 2021-01-01 RX ADMIN — HYDROMORPHONE HYDROCHLORIDE 4 MG/HR: 2 INJECTION INTRAMUSCULAR; INTRAVENOUS; SUBCUTANEOUS at 15:21

## 2021-01-01 RX ADMIN — CEFTRIAXONE 1000 MILLIGRAM(S): 500 INJECTION, POWDER, FOR SOLUTION INTRAMUSCULAR; INTRAVENOUS at 18:44

## 2021-01-01 RX ADMIN — HYDROMORPHONE HYDROCHLORIDE 0.5 MILLIGRAM(S): 2 INJECTION INTRAMUSCULAR; INTRAVENOUS; SUBCUTANEOUS at 06:10

## 2021-01-01 RX ADMIN — SODIUM CHLORIDE 1000 MILLILITER(S): 9 INJECTION INTRAMUSCULAR; INTRAVENOUS; SUBCUTANEOUS at 19:17

## 2021-01-01 RX ADMIN — CEFTRIAXONE 100 MILLIGRAM(S): 500 INJECTION, POWDER, FOR SOLUTION INTRAMUSCULAR; INTRAVENOUS at 18:03

## 2021-01-01 RX ADMIN — Medication 50 MILLIEQUIVALENT(S): at 20:43

## 2021-01-01 RX ADMIN — HYDROMORPHONE HYDROCHLORIDE 1 MILLIGRAM(S): 2 INJECTION INTRAMUSCULAR; INTRAVENOUS; SUBCUTANEOUS at 07:59

## 2021-01-01 RX ADMIN — HYDROMORPHONE HYDROCHLORIDE 1 MILLIGRAM(S): 2 INJECTION INTRAMUSCULAR; INTRAVENOUS; SUBCUTANEOUS at 07:44

## 2021-01-01 RX ADMIN — SODIUM ZIRCONIUM CYCLOSILICATE 10 GRAM(S): 10 POWDER, FOR SUSPENSION ORAL at 22:43

## 2021-01-01 RX ADMIN — Medication 2 MILLIGRAM(S): at 11:25

## 2021-01-01 RX ADMIN — PIPERACILLIN AND TAZOBACTAM 25 GRAM(S): 4; .5 INJECTION, POWDER, LYOPHILIZED, FOR SOLUTION INTRAVENOUS at 08:17

## 2021-01-01 RX ADMIN — FENTANYL CITRATE 25 MICROGRAM(S): 50 INJECTION INTRAVENOUS at 22:00

## 2021-01-01 RX ADMIN — HYDROMORPHONE HYDROCHLORIDE 1 MILLIGRAM(S): 2 INJECTION INTRAMUSCULAR; INTRAVENOUS; SUBCUTANEOUS at 09:40

## 2021-01-01 RX ADMIN — HYDROMORPHONE HYDROCHLORIDE 4 MG/HR: 2 INJECTION INTRAMUSCULAR; INTRAVENOUS; SUBCUTANEOUS at 11:36

## 2021-01-01 RX ADMIN — MORPHINE SULFATE 2 MILLIGRAM(S): 50 CAPSULE, EXTENDED RELEASE ORAL at 22:43

## 2021-01-01 RX ADMIN — HYDROMORPHONE HYDROCHLORIDE 2 MILLIGRAM(S): 2 INJECTION INTRAMUSCULAR; INTRAVENOUS; SUBCUTANEOUS at 08:15

## 2021-01-01 RX ADMIN — FENTANYL CITRATE 25 MICROGRAM(S): 50 INJECTION INTRAVENOUS at 21:30

## 2021-01-01 RX ADMIN — Medication 50 MILLIEQUIVALENT(S): at 17:07

## 2021-01-01 RX ADMIN — HYDROMORPHONE HYDROCHLORIDE 1 MILLIGRAM(S): 2 INJECTION INTRAMUSCULAR; INTRAVENOUS; SUBCUTANEOUS at 09:25

## 2021-01-01 RX ADMIN — PANTOPRAZOLE SODIUM 40 MILLIGRAM(S): 20 TABLET, DELAYED RELEASE ORAL at 08:56

## 2021-01-01 RX ADMIN — SODIUM CHLORIDE 1000 MILLILITER(S): 9 INJECTION INTRAMUSCULAR; INTRAVENOUS; SUBCUTANEOUS at 16:50

## 2021-01-01 RX ADMIN — SODIUM CHLORIDE 125 MILLILITER(S): 9 INJECTION, SOLUTION INTRAVENOUS at 09:00

## 2021-01-01 RX ADMIN — Medication 0.1 MILLIGRAM(S): at 20:26

## 2021-01-01 RX ADMIN — HEPARIN SODIUM 5000 UNIT(S): 5000 INJECTION INTRAVENOUS; SUBCUTANEOUS at 22:47

## 2021-01-01 RX ADMIN — SODIUM CHLORIDE 125 MILLILITER(S): 9 INJECTION, SOLUTION INTRAVENOUS at 20:43

## 2021-01-07 PROBLEM — Z20.828 CONTACT WITH AND (SUSPECTED) EXPOSURE TO OTHER VIRAL COMMUNICABLE DISEASES: Status: ACTIVE | Noted: 2021-01-01

## 2021-01-11 PROBLEM — D64.9 ANEMIA: Status: ACTIVE | Noted: 2021-01-01

## 2021-01-14 NOTE — PATIENT PROFILE ADULT - NSPROGENPREVTRANSF_GEN_A_NUR
1. Twice a day for 1-2 weeks, then once a day for 1 to 2 weeks, then stop and see how he does. Can use intermittently with illnesses if he gets sick and if the cough persists     no

## 2021-01-19 PROBLEM — J44.9 COPD (CHRONIC OBSTRUCTIVE PULMONARY DISEASE): Status: ACTIVE | Noted: 2018-12-20

## 2021-01-21 NOTE — PROGRESS NOTE ADULT - SUBJECTIVE AND OBJECTIVE BOX
CC: Cough  HPI: 64 y/o M PMHx significant for COPD, long-standing hx of tobacco use, and hepatitis C presented  to the ED c/o shortness of breath, wheezing, and productive cough worse over the last 1-2 days PTA.  He  start feeling sick over 1 weeks before ER visit.    In triage VS => /min, RR 22/min. Labs => WBC 25, HCO3 34, Lactate 1.1, RVP (-). In the ED the patient was given Levofloxacin 500mg IVPB x 1, Magnesium sulfate 1g IVPB x 1, Methylprednisolone 125mg IVP x 1, NS x 1.5L, and Combivent nebs x 3. CT Chest => 1. Centrilobular and paraseptal emphysema. 2. Scattered tree in bud opacities. Bilateral bronchial wall thickening. Findings compatible with nonspecific bronchiolitis.  Pt was admitted to the hospital. Was evaluated by Pulm . Clinically improved on IV Abxs and Steroids. Today reports SOB and cough improving, ambulated in the hallway off O2 with stable pulse ox, no significant SOB. D/c planning, meds and outPt f/u discussed. Pt was planned for d/c to shelter, but as per SW/CM did not get answer  from facility, will have to hold d/c until Monday 12/1: Chart reviewed, mild productive cough, no fevers. Feels slightly weak.     12/2: No complaints, mildly productive cough noted, no fevers/ chills.   ROS: neg unless stated above.     Vital Signs Last 24 Hrs  T(C): 36.9 (02 Dec 2018 12:04), Max: 36.9 (02 Dec 2018 12:04)  T(F): 98.4 (02 Dec 2018 12:04), Max: 98.4 (02 Dec 2018 12:04)  HR: 90 (02 Dec 2018 12:04) (75 - 101)  BP: 126/58 (02 Dec 2018 12:04) (118/71 - 127/71)  BP(mean): --  RR: 16 (02 Dec 2018 12:04) (16 - 17)  SpO2: 95% (02 Dec 2018 12:04) (95% - 100%)    PHYSICAL EXAM:  General: Well developed;  mildly dyspneic on prolonged conversation, at his baseline.   Eyes: PERRLA, EOMI; conjunctiva and sclera clear  Head: Normocephalic; atraumatic  ENMT: No nasal discharge; airway clear  Neck: Supple; no JVD   Respiratory: Better air enntry, decreased BS at bases,  no rhonchi or rales  Cardiovascular: Regular rate and rhythm. S1 and S2 Normal; No murmurs  Gastrointestinal: Soft non-tender non-distended; Normal bowel sounds  Genitourinary: No costovertebral angle tenderness  Extremities: Normal range of motion, No edema, right forearm with mobile soft tissue mass likely lipoma, nontender.   Vascular: Peripheral pulses palpable 2+ bilaterally  Neurological: Alert and oriented x4, non focal   Skin: Warm and dry. No acute rash  Lymph Nodes: No acute cervical adenopathy  Musculoskeletal: Normal muscle tone, without deformities  Psychiatric: Cooperative and appropriate                            12.6   23.71 )-----------( 301      ( 30 Nov 2018 06:33 )             38.8   MEDICATIONS  (STANDING):  ALBUTerol/ipratropium for Nebulization 3 milliLiter(s) Nebulizer every 6 hours  cefuroxime   Tablet 500 milliGRAM(s) Oral every 12 hours  enoxaparin Injectable 40 milliGRAM(s) SubCutaneous every 24 hours  guaiFENesin ER 1200 milliGRAM(s) Oral every 12 hours  nicotine - 21 mG/24Hr(s) Patch 1 patch Transdermal daily  predniSONE   Tablet 40 milliGRAM(s) Oral daily  tiotropium 18 MICROgram(s) Capsule 1 Capsule(s) Inhalation daily    A/P:      64 y/o M PMHx significant for COPD, long-standing hx of tobacco use, and hepatitis C admitted for:     1. Acute hypoxic Respiratory failure due to COPD exacerbation and Community acquired pneumonia.  Acute Bronchiolitis. Severe emphysema   - clinically better   - CT chest reviewed: pulmonary nodule 8mm cystic LLL --> RECOMMEND 6 month f/u repeat CT Chest. DISCUSSED WITH PATIENT  - ABG reviewed   - RVP neg   - neg. BCXs   - send Legionella/Strep pneumo urine Ag: negative   - s/p IV abx, now on Ceftin x 5 days day 3/5  - Repeat CXR   with improvement  - C/w Spiriva, Albuterol and Symbicort   -F/u with Dr Herron     2.  Chronic hepatitis C  - not currently on treatment.   - LFTs WNL  - F/u with PCP for further management     3.  Leukocytosis, initially likely due to PNA  - still elevated likely due to steroids, star trending down   - no fevers  - repeat labs with PCP next week     4. DVT PPxs : On Lovenox     Dispo:  Stable for d/c today but not able to get placement to shelter, d/c on hold till Monday as per CW   Discussed w/ RN. OFFICE VISIT      Patient: Gabriele Garcia Date of Service: 2021   : 1993 MRN: [id number]     SUBJECTIVE:   HISTORY OF PRESENT ILLNESS:  Gabriele Garcia is a 27 year old female who presents today for   Chief Complaint   Patient presents with   • Office Visit     well visit     This visit is being performed via video to discuss Office Visit (well visit)    Clinician Location: [address]    Gabriele is in [place] and her identity has been established.   She was informed that consent to treat includes permission to submit charges to the applicable insurance on file. Gabriele was advised regarding the potential risk inherent in video visits, as the assessment may be limited due to what can be seen on the screen which potentially results in an incomplete assessment; as well as either of us may discontinue the video visit if it is felt that the videoconferencing connections are not adequate for his/her situation.   11-20 minutes were spent in this encounter.    HPI  Patient is a 26 yo F with PMHX as below presenting for HME    # Yuan Chiari   - MRI was ordered by neurology, she will be getting it done this week   - she has been having muscle spasms and headaches from it   - she has a follow up with neuro on 2021  - has run out of the ibuprofen 600 mg and flexiril, requesting refill     # HME  - patient had blood work done by her neurologist, she states it included TSH and lipid  - PAP negative in 2019  - Tdap in 2017    PAST MEDICAL HISTORY:  Past Medical History:   Diagnosis Date   • Arnold-Chiari malformation (CMS/HCC)    • Bell's palsy      when pregnant with triplets . now resolved       MEDICATIONS:  Current Outpatient Medications   Medication Sig   • cyclobenzaprine (FLEXERIL) 5 MG tablet Take 1 tablet by mouth 3 times daily as needed for Muscle spasms.   • topiramate (TOPAMAX) 25 MG capsule Take one 25 mg tablet daily for one week. Take one 25mg tablet twice daily  for second week. Then take 25mg tablet in the morning and two 25 mg  tablets in the evening on the third week. Take 50 mg tablet twice daily starting the fourth week and continue.   • topiramate (TOPAMAX) 50 MG tablet Take 1 tablet by mouth 2 times daily.   • ibuprofen (MOTRIN) 600 MG tablet Take 1 tablet by mouth every 6 hours as needed for Pain.   • azelastine (ASTELIN) 0.1 % nasal spray Spray 1 spray in each nostril 2 times daily. Use in each nostril as directed   • ondansetron (ZOFRAN) 4 MG tablet Take 4 mg by mouth every 8 hours as needed.     No current facility-administered medications for this visit.        ALLERGIES:  ALLERGIES:   Allergen Reactions   • Shellfish Allergy   (Food Or Med) HIVES   • Shrimp   (Food) SWELLING       PAST SURGICAL HISTORY:  Past Surgical History:   Procedure Laterality Date   •  section, low transverse     • D and c     • Gallbladder surgery         FAMILY HISTORY:  Family History   Problem Relation Age of Onset   • Stroke Maternal Aunt    • Stroke Maternal Uncle    • Stroke Maternal Grandmother    • Stroke Maternal Uncle    • Hypertension Mother    • Asthma Father        SOCIAL HISTORY:  Social History     Tobacco Use   • Smoking status: Never Smoker   • Smokeless tobacco: Never Used   Substance Use Topics   • Alcohol use: Never     Frequency: Never   • Drug use: Never       SCREENINGS:   No exam data present    Recent PHQ 2/9 Score    PHQ 2:  Date Adult PHQ 2 Score Adult PHQ 2 Interpretation   2021 0 No further screening needed       PHQ 9:     Most Recent LUBNA 7 Score            Review of Systems   Constitutional: Negative.    HENT: Negative.    Eyes: Negative.    Respiratory: Negative.    Cardiovascular: Negative.    Gastrointestinal: Negative.    Neurological: Positive for headaches.         OBJECTIVE:     Visit Vitals  LMP 2020 (Exact Date)       Physical Exam   Constitutional: She is oriented to person, place, and time. She appears well-developed and  well-nourished.   Pulmonary/Chest: Effort normal.   Neurological: She is alert and oriented to person, place, and time. No cranial nerve deficit.   Psychiatric: She has a normal mood and affect. Her behavior is normal. Judgment and thought content normal.   Nursing note reviewed.      DIAGNOSTIC STUDIES:   LAB RESULTS:    Lab Results Reviewed    No visits with results within 1 Month(s) from this visit.   Latest known visit with results is:   Lab Services on 02/10/2020   Component Date Value Ref Range Status   • Food Banana IgE Allergen 02/10/2020 <0.35  <0.35 KU/L Final   • Food Banana Class 02/10/2020 0  0 Final   • Food Barley IgE Allergen 02/10/2020 <0.35  <0.35 KU/L Final   • Food Barley Class 02/10/2020 0  0 Final   • Food Beef IgE Allergen 02/10/2020 <0.35  <0.35 KU/L Final   • Food Beef Class 02/10/2020 0  0 Final   • Food Cheddar Cheese IgE Allergen 02/10/2020 <0.35  0 - 0.35 KU/L Final   • Cheddar Cheese Class 02/10/2020 0  0 Final   • Food Chicken IgE Allergen 02/10/2020 <0.35  0 - 0.35 KU/L Final   • Food Chicken Class 02/10/2020 0  0 Final   • Food Corn IgE Allergen 02/10/2020 <0.35  <0.35 KU/L Final   • Food Corn Class 02/10/2020 0  0 Final   • Food Egg White IgE  Allergen 02/10/2020 <0.35  <0.35 KU/L Final   • Food Egg White Class 02/10/2020 0  0 Final   • Food Gluten IgE Allergen 02/10/2020 <0.35  <0.35 KU/L Final   • Food Gluten Class 02/10/2020 0  0 Final   • Food Cow's Milk IgE Allergen 02/10/2020 <0.35  <0.35 KU/L Final   • Food Cow's Milk Class 02/10/2020 0  0 Final   • Food Oat IgE Allergen 02/10/2020 <0.35  <0.35 KU/L Final   • Food Oat Class 02/10/2020 0  0 Final   • Food Orange IgE  Allergen 02/10/2020 <0.35  <0.35 KU/L Final   • Food [place] Class 02/10/2020 0  0 Final   • Food Peanut IgE Allergen 02/10/2020 <0.35  <0.35 KU/L Final   • Food Peanut Class 02/10/2020 0  0 Final   • Food Pork IgE Allergen 02/10/2020 <0.35  <0.35 KU/L Final   • Food Pork Class 02/10/2020 0  0 Final   • Food Potato  IgE Allergen 02/10/2020 <0.35  <0.35 KU/L Final   • Food Potato Class 02/10/2020 0  0 Final   • Food Rice IgE Allergen 02/10/2020 <0.35  <0.35 KU/L Final   • Food Rice Class 02/10/2020 0  0 Final   • Food Rye IgE Allergen 02/10/2020 <0.35  <0.35 KU/L Final   • Food Rye Class 02/10/2020 0  0 Final   • Food Soybean IgE Allergen 02/10/2020 <0.35  <0.35 KU/L Final   • Food Soybean Class 02/10/2020 0  0 Final   • Food Tomato IgE Allergen 02/10/2020 <0.35  <0.35 KU/L Final   • Food Tomato Class 02/10/2020 0  0 Final   • Food Wheat IgE Allergen 02/10/2020 <0.35  <0.35 KU/L Final   • Food Wheat Class 02/10/2020 0  0 Final   • Food Yeast IgE Allergen 02/10/2020 <0.35  <0.35 KU/L Final   • Food Yeast Class 02/10/2020 0  0 Final   • Fasting Status 02/10/2020 12  hrs Final   • Sodium 02/10/2020 142  135 - 145 mmol/L Final   • Potassium 02/10/2020 4.3  3.4 - 5.1 mmol/L Final   • Chloride 02/10/2020 109* 98 - 107 mmol/L Final   • Carbon Dioxide 02/10/2020 28  21 - 32 mmol/L Final   • Anion Gap 02/10/2020 9* 10 - 20 mmol/L Final   • Glucose 02/10/2020 75  65 - 99 mg/dL Final   • BUN 02/10/2020 6  6 - 20 mg/dL Final   • Creatinine 02/10/2020 0.71  0.51 - 0.95 mg/dL Final   • GFR Estimate,  02/10/2020 >90   Final   • GFR Estimate, Non  02/10/2020 >90   Final   • BUN/Creatinine Ratio 02/10/2020 9  7 - 25 Final   • CALCIUM 02/10/2020 8.9  8.4 - 10.2 mg/dL Final   • TOTAL BILIRUBIN 02/10/2020 0.3  0.2 - 1.0 mg/dL Final   • AST/SGOT 02/10/2020 13  <38 Units/L Final   • ALT/SGPT 02/10/2020 23  <64 Units/L Final   • ALK PHOSPHATASE 02/10/2020 93  45 - 117 Units/L Final   • TOTAL PROTEIN 02/10/2020 6.9  6.4 - 8.2 g/dL Final   • Albumin 02/10/2020 3.8  3.6 - 5.1 g/dL Final   • GLOBULIN 02/10/2020 3.1  2.0 - 4.0 g/dL Final   • A/G Ratio, Serum 02/10/2020 1.2  1.0 - 2.4 Final   • WBC 02/10/2020 8.3  4.2 - 11.0 K/mcL Final   • RBC 02/10/2020 4.14  4.00 - 5.20 mil/mcL Final   • HGB 02/10/2020 11.1* 12.0 - 15.5  g/dL Final   • HCT 02/10/2020 35.4* 36.0 - 46.5 % Final   • MCV 02/10/2020 85.5  78.0 - 100.0 fl Final   • MCH 02/10/2020 26.8  26.0 - 34.0 pg Final   • MCHC 02/10/2020 31.4* 32.0 - 36.5 g/dL Final   • RDW-CV 02/10/2020 13.9  11.0 - 15.0 % Final   • PLT 02/10/2020 285  140 - 450 K/mcL Final   • NRBC 02/10/2020 0  0 /100 WBC Final   • DIFF TYPE 02/10/2020 AUTOMATED DIFFERENTIAL   Final   • Neutrophil 02/10/2020 67  % Final   • LYMPH 02/10/2020 25  % Final   • MONO 02/10/2020 6  % Final   • EOSIN 02/10/2020 2  % Final   • BASO 02/10/2020 0  % Final   • Percent Immature Granuloctyes 02/10/2020 0  % Final   • Absolute Neutrophil 02/10/2020 5.5  1.8 - 7.7 K/mcL Final   • Absolute Lymph 02/10/2020 2.1  1.0 - 4.8 K/mcL Final   • Absolute Mono 02/10/2020 0.5  0.3 - 0.9 K/mcL Final   • Absolute Eos 02/10/2020 0.2  0.1 - 0.5 K/mcL Final   • Absolute Baso 02/10/2020 0.0  0.0 - 0.3 K/mcL Final   • Absolute Immature Granulocytes 02/10/2020 0.0  0 - 0.2 K/mcl Final   • TSH 02/10/2020 5.893* 0.350 - 5.000 mcUnits/mL Final   • FASTING STATUS 02/10/2020 12  hrs Final   • CHOLESTEROL 02/10/2020 134  <200 mg/dL Final   • CALCULATED LDL 02/10/2020 80  <130 mg/dL Final   • HDL 02/10/2020 37* >49 mg/dL Final   • TRIGLYCERIDE 02/10/2020 87  <150 mg/dL Final   • CALCULATED NON HDL 02/10/2020 97  mg/dL Final   • CHOL/HDL 02/10/2020 3.6  <4.5 Final       ASSESSMENT AND PLAN:   This is a 27 year old year-old female who presents with   Chief Complaint   Patient presents with   • Office Visit     well visit   .      Problem List Items Addressed This Visit        Nervous    Arnold-Chiari malformation (CMS/HCC) - Primary     - medications refilled   - has f/u with neuro and MRI in feb 2021  - please send over labs done for me to review  - Medical compliance with plan discussed and risks of non-compliance reviewed.   Patient education completed on disease process, etiology & prognosis.   Proper usage and side effects of medications reviewed &  discussed.   Return to clinic as clinically indicated (worsening or new symptoms) as discussed with patient who verbalized understanding of & agreement with the plan           Relevant Medications    ibuprofen (MOTRIN) 600 MG tablet    cyclobenzaprine (FLEXERIL) 5 MG tablet          Return if symptoms worsen or fail to improve.      Instructions provided as documented in the AVS.      The patient indicated understanding of the diagnosis and agreed with the plan of care.      Erica Decker MD

## 2021-01-26 PROBLEM — Z79.899 PATIENT ON COMBINED CHEMOTHERAPY AND RADIATION: Status: RESOLVED | Noted: 2020-01-01 | Resolved: 2021-01-01

## 2021-01-26 NOTE — REASON FOR VISIT
[Follow-Up Visit] : a follow-up [Home] : at home, [unfilled] , at the time of the visit. [Medical Office: ([facility])___] : at the medical office located in  [Spouse] : spouse [Verbal consent obtained from patient] : the patient, [unfilled] [FreeTextEntry2] : oral cavity cancer  to discuss recent PET

## 2021-01-26 NOTE — REVIEW OF SYSTEMS
History  Chief Complaint   Patient presents with    Ankle Injury     pt running around about an hour ago, fell, and twisted right ankle  c/o pain and swelling  denies hitting head     Patient presents to the emergency department today ambulatory offering a chief complaint of right ankle pain and swelling  Noticed the pain earlier today approximately 3 hours ago when he was running he the ankle  No history of fall  He does not experience pain with ambulation only when he hyperextends of hyper flexes the right ankle  Denies any proximal leg pain  Denies any sensation deficits  No history of laceration  Prior to Admission Medications   Prescriptions Last Dose Informant Patient Reported? Taking?   guanFACINE (TENEX) 1 mg tablet 11/25/2018 at Unknown time Family Member Yes Yes   Sig: Take 1 mg by mouth daily Takes one half of a tablet     loratadine (CLARITIN) 5 MG chewable tablet 11/25/2018 at Unknown time Family Member Yes Yes   Sig: Chew 5 mg as needed     oxybutynin (DITROPAN) 5 mg tablet 11/24/2018 at Unknown time Family Member Yes Yes   Sig: Take 5 mg by mouth daily at bedtime      Facility-Administered Medications: None       Past Medical History:   Diagnosis Date    ADHD (attention deficit hyperactivity disorder)        History reviewed  No pertinent surgical history  History reviewed  No pertinent family history  I have reviewed and agree with the history as documented  Social History   Substance Use Topics    Smoking status: Passive Smoke Exposure - Never Smoker    Smokeless tobacco: Never Used    Alcohol use Not on file        Review of Systems   Constitutional: Negative  Respiratory: Negative  Cardiovascular: Negative  Musculoskeletal: Positive for joint swelling  Negative for arthralgias, back pain, gait problem, myalgias, neck pain and neck stiffness  Right ankle pain and swelling   Skin: Negative  Psychiatric/Behavioral: Negative      All other systems reviewed and are negative  Physical Exam  Physical Exam   Constitutional: He appears well-developed and well-nourished  He is active  No distress  Cardiovascular: Normal rate and regular rhythm  Pulmonary/Chest: Effort normal    Musculoskeletal: He exhibits edema, tenderness and signs of injury  He exhibits no deformity  Neurological: He is alert  Skin: Skin is warm  Capillary refill takes less than 2 seconds  He is not diaphoretic  Vitals reviewed  Vital Signs  ED Triage Vitals [11/25/18 1757]   Temperature Pulse Respirations Blood Pressure SpO2   98 2 °F (36 8 °C) 98 20 118/74 98 %      Temp src Heart Rate Source Patient Position - Orthostatic VS BP Location FiO2 (%)   Temporal Monitor Sitting Left arm --      Pain Score       9           Vitals:    11/25/18 1757   BP: 118/74   Pulse: 98   Patient Position - Orthostatic VS: Sitting       Visual Acuity      ED Medications  Medications - No data to display    Diagnostic Studies  Results Reviewed     None                 XR ankle 3+ views RIGHT   ED Interpretation by Zaheer Yeager PA-C (11/25 1820)   There is a slight cortical abnormality noted on the distal end of the right fibula in the area of the growth plate  Will splint  He is tender in this region                 Procedures  Procedures       Phone Contacts  ED Phone Contact    ED Course                               MDM  CritCare Time    Disposition  Final diagnoses:   Fibula fracture     Time reflects when diagnosis was documented in both MDM as applicable and the Disposition within this note     Time User Action Codes Description Comment    11/25/2018  6:25 PM [place] Jeans Add [V89 177K] Fibula fracture       ED Disposition     ED Disposition Condition Comment    Discharge  Sherman Son discharge to home/self care      Condition at discharge: Good        Follow-up Information     Follow up With Specialties Details Why Contact Info    Agnes Vela MD Orthopedic Surgery Schedule an appointment as soon as possible for a visit  [address],  [address] [phone]            Patient's Medications   Discharge Prescriptions    No medications on file     No discharge procedures on file      ED Provider  Electronically Signed by           Erendira Carreon PA-C  11/25/18 7675 [Recent Change In Weight] : ~T recent weight change [Negative] : Allergic/Immunologic [Shortness Of Breath] : no shortness of breath [FreeTextEntry4] : per HPI  [FreeTextEntry7] : per HPI  [de-identified] : minimal numbness/ tingling

## 2021-01-26 NOTE — HISTORY OF PRESENT ILLNESS
[Disease: _____________________] : Disease: [unfilled] [T: ___] : T[unfilled] [N: ___] : N[unfilled] [AJCC Stage: ____] : AJCC Stage: [unfilled] [Other:____] : [unfilled] [de-identified] : 63 y/o male presented with left lower jaw pain and oral nodule on the inside of his left jaw. Biopsy of the mass was c/w squamous cell cancer.\par He has 40 pack year history of smoking, quit in early March 2020. he has history of alcohol abuse, quit 14 years ago.\par PET/CT 3/27/20 : 1.1 cm minimally avid spiculated ESTELLA nodule ( new since 11/2026). FDG avid mass left retromolar trigone/ posterior mandible SUV 19.1 with extension into left mandible Left level 2A lymph node 1.2 x 0.9 cm SUV 13.5. CT guided lung biopsy was felt to be high risk due to location and severe emphysema\par He was evaluated by thoracic surgery Dr Davis and also his case was presented at  head and neck multidisciplinary tumor board. Standard of care would have been wedge resection and oral cavity resection. At present time surgery cannot be performed  safely due to COVID pandemic / extremely high volume of COVID patients in hospitals/ ICU. He was referred to radiation oncology and medical oncology for consideration for neoadjuvant therapy/ or definitive nonsurgical management.\par \par \par He has significant comorbidities including COPD, liver cirrhosis, significant peripheral vascular disease.\par \par  RT with weekly cisplatin  4/30/20-  6/15/20 ( definitive- poor surgical candidate due to comorbidities)  \par \par PET 10/4/20 : compared to [facility] 2020: avid focus left retromolar trigone / left palatine tosil- concern for residual disease versus post treatment inflammation.\par Minimally avid ( SUV 2.1    1.1 cm ESTELLA pulmonary nodule no change .  New mildly avid compression deformities T 6 and T 9 likely benign. \par \par Needed EUA to evaluate persistent avid area recommended. Needs medical clearance. Reluctant to go for test.  Monitored. \par \par ESTELLA nodule- evaluated at [facility]  bx  c/w squamous cell cancer . S/p  stereotactic RT s/p RT ( Dr Gutierrez  ) completed mid September .\par \par November 2020- hospitalized for Covid.\par \par PET 1/15/21 : compared to PET  Oct and CT chest Dec : increased extent of ulcerative area of FDG avidity L retromolar trigone with superior extension and  new erosion of left posterior maxilla suspicious for POD. Indeterminate focus of uptake  L vocal cord. Unchanged and minimally avid ESTELLA pulmonary nodule.  [de-identified] : squamous cell cancer  [de-identified] : PDL1 combined positive score 1  [de-identified] : Recovered form Covid. Eating " a lot better"- ensure, soft food. Weight stable now, hoping to gain some weight back. No oral pain but has some new hoarseness.\par Still tries to work part time.

## 2021-01-26 NOTE — ASSESSMENT
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 Clinical stage T4,N1 IV B.\par S/p  definitive chemoRT  completed in June 2020.\par \par Recent PET- suspicious for local disease progression.\par \par Will d/w Dr Pittman and Dr Dixon but I do not believe that there are any RT or surgical options.\par \par Will be a candidate for palliative systemic therapy. Recurrence/ progression in ~ 6 months after completing platinum based chemoRT is a poor prognostic sign.\par \par Overall condition seems quite Frail.\par Recommend immunotherapy - discussed logistics and side effects.\par \par \par Solitary lung nodule bx squamous cell cancer- most likely separate lung primary although metastatic lesion is a possibility. S/p RT at [facility]. Stable. he follows at [facility]- not active  now. \par \par Long discussion with patient and his ex wife Myah. \par Will call them back with final team recommendations by the end of the week. \par \par \par

## 2021-02-22 NOTE — PROCEDURE
[Trismus] : trismus preventing mirror examination [None] : none [Flexible Endoscope] : examined with the flexible endoscope [Serial Number: ___] : Serial Number: [unfilled] [de-identified] : No lesions in the NPx, OPx, HPx or larynx. Stable posttreatment changes, VC are mobile, airway patent.

## 2021-02-22 NOTE — PHYSICAL EXAM
[Laryngoscopy Performed] : laryngoscopy was performed, see procedure section for findings [Normal] : no rashes [de-identified] : Posttreatment changes, no LAD. [FreeTextEntry1] : Erosive lesion with exposed mandibular ramus along the L. RMT - significantly improved from original exams.  There is an area of necrotic bone debris but no significant ulcerative mucosal lesions.  No other lesions.  Patient with trismus.

## 2021-02-22 NOTE — HISTORY OF PRESENT ILLNESS
[de-identified] : 65 year old male, positive Hep C, [place] [facility] [place] ( [place] Gutierrez) for lung SCCa. 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 History of smoking 1 pack per day x 40 years, quit early March. History of alcohol abuse with beer, stopped 14 years ago. Pt finished CXRT on 6/19/2020 with Dr. Pittman and Dr. House. Pt is eating by mouth and taking ensure supplements and soft diet .  Pt is experiencing trismus and using denture.  last pet ct 1/15/2021.\par pt is here for possible oral biopsy and pending further treatment.

## 2021-03-04 NOTE — H&P PST ADULT - ASSESSMENT
Preop dx malignant neoplasm of retromolar    direct laryngoscopy with biopsy esophagoscopy  Preop dx malignant neoplasm of retromolar

## 2021-03-04 NOTE — H&P PST ADULT - NSANTHOSAYNRD_GEN_A_CORE
No. OCTAVIA screening performed.  STOP BANG Legend: 0-2 = LOW Risk; 3-4 = INTERMEDIATE Risk; 5-8 = HIGH Risk

## 2021-03-04 NOTE — H&P PST ADULT - NSICDXPROBLEM_GEN_ALL_CORE_FT
PROBLEM DIAGNOSES  Problem: Malignant neoplasm of retromolar area  Assessment and Plan: Scheduled for direct laryngoscopy with biopsy esophagoscopy on 3/10/2021  Written & verbal preop instructions, gi prophylaxis   Pt verbalized good understanding.      Problem: Encounter for preprocedure screening laboratory testing for COVID-19  Assessment and Plan: per pt scheduled within 72 hrs of this surgery

## 2021-03-04 NOTE — H&P PST ADULT - HISTORY OF PRESENT ILLNESS
malignant neoplasm of retromolar    64Y/o male with  h/o esophageal cancer dx 2020 treated with chemo & radiation therapy.  Pt denies surgery.  Pt with c/o discomfort and dysphagia right lower jaw area.  Preop dx malignant neoplasm of retromolar.  Scheduled for

## 2021-03-04 NOTE — H&P PST ADULT - NSICDXPASTMEDICALHX_GEN_ALL_CORE_FT
PAST MEDICAL HISTORY:  Cirrhosis     COPD (chronic obstructive pulmonary disease)     GERD (gastroesophageal reflux disease)     Hepatitis C     Nodule of left lung squamous cell cancer of unknown etiology    Squamous cell skin cancer, jawline left retromolar trigone     PAST MEDICAL HISTORY:  Cirrhosis     COPD (chronic obstructive pulmonary disease)     GERD (gastroesophageal reflux disease)     Hepatitis C     Malignant neoplasm of retromolar area     Nodule of left lung squamous cell cancer of unknown etiology    Squamous cell skin cancer, jawline left retromolar trigone     PAST MEDICAL HISTORY:  Cirrhosis     COPD (chronic obstructive pulmonary disease)     COVID-19 virus infection 12/2020    GERD (gastroesophageal reflux disease)     Hepatitis C     Malignant neoplasm of retromolar area     Nodule of left lung squamous cell cancer of unknown etiology    Squamous cell skin cancer, jawline left retromolar trigone

## 2021-03-07 PROBLEM — Z01.818 PREOP TESTING: Status: ACTIVE | Noted: 2021-01-01

## 2021-03-09 NOTE — ASU PATIENT PROFILE, ADULT - VISION (WITH CORRECTIVE LENSES IF THE PATIENT USUALLY WEARS THEM):
Normal vision: sees adequately in most situations; can see medication labels, newsprint reading glasses, pt did not bring with him/Partially impaired: cannot see medication labels or newsprint, but can see obstacles in path, and the surrounding layout; can count fingers at arm's length

## 2021-03-09 NOTE — ASU PATIENT PROFILE, ADULT - PMH
Cirrhosis    COPD (chronic obstructive pulmonary disease)    COVID-19 virus infection  12/2020  GERD (gastroesophageal reflux disease)    Hepatitis C    Malignant neoplasm of retromolar area    Nodule of left lung  squamous cell cancer of unknown etiology  Squamous cell skin cancer, jawline  left retromolar trigone

## 2021-03-11 NOTE — DISCHARGE NOTE PROVIDER - NSDCCPCAREPLAN_GEN_ALL_CORE_FT
PRINCIPAL DISCHARGE DIAGNOSIS  Diagnosis: Malignant neoplasm of retromolar area  Assessment and Plan of Treatment: - Please follow up with Dr. Dixon as scheduled

## 2021-03-11 NOTE — DISCHARGE NOTE PROVIDER - HOSPITAL COURSE
65 year old male malignant neoplasm of retromolar area S/P direct laryngoscopy, esophagoscopy with biopsy on 03/10/2021. Patient monitored on floor overnight due to issue with ride home. Patient tolerated diet and pain is controlled. Patient was cleared for discharge home by Dr. Dixon on 03/11/2021. All prescriptions were sent to a pharmacy that was agreed on with the patient.

## 2021-03-11 NOTE — DISCHARGE NOTE PROVIDER - NSDCMRMEDTOKEN_GEN_ALL_CORE_FT
albuterol 90 mcg/inh inhalation aerosol: 2 puff(s) inhaled every 6 hours, As Needed  Breo Ellipta 100 mcg-25 mcg/inh inhalation powder: 1 puff(s) inhaled once a day  Symbicort 80 mcg-4.5 mcg/inh inhalation aerosol: 2 puff(s) inhaled once a day

## 2021-03-11 NOTE — DISCHARGE NOTE PROVIDER - CARE PROVIDER_API CALL
Javier Martínez)  [facility]; Otolaryngology  [address] [place] [id number]  Phone: [phone]  Fax: [phone]  Follow Up Time:

## 2021-03-11 NOTE — DISCHARGE NOTE NURSING/CASE MANAGEMENT/SOCIAL WORK - PATIENT PORTAL LINK FT
You can access the FollowMyHealth Patient Portal offered by [facility] by registering at the following website: [email or web address]. By joining [employer]’s FollowMyHealth portal, you will also be able to view your health information using other applications (apps) compatible with our system.

## 2021-03-11 NOTE — PROGRESS NOTE ADULT - SUBJECTIVE AND OBJECTIVE BOX
Patient seen and examined at the bedside. No acute events overnight.    T(C): 36.4 (03-10-21 @ 23:30), Max: 37.1 (03-10-21 @ 13:00)  HR: 71 (03-10-21 @ 23:30) (66 - 73)  BP: 140/82 (03-10-21 @ 23:30) (119/66 - 158/80)  RR: 17 (03-10-21 @ 23:30) (13 - 18)  SpO2: 95% (03-10-21 @ 23:30) (95% - 100%)  NAD, AOx3  No respiratory distress, stridor, stertor on RA  Voice quality: normal  PERRL, EOMI  OC/OP: tongue and FOM soft, no lesions, OP clear  CN II-XII intact    A/P: 65M s/p DLE with biopsy 3/10 Doing well overall  - pain control  - home meds  - clear for discharge  - regular diet Patient seen and examined at the bedside. No acute events overnight.    T(C): 36.6 (03-11-21 @ 06:18), Max: 37.1 (03-10-21 @ 13:00)  HR: 84 (03-11-21 @ 06:18) (60 - 84)  BP: 143/74 (03-11-21 @ 06:18) (119/66 - 158/80)  RR: 17 (03-11-21 @ 06:18) (13 - 18)  SpO2: 97% (03-11-21 @ 06:18) (95% - 100%)  NAD, AOx3  No respiratory distress, stridor, stertor on RA  Voice quality: normal  PERRL, EOMI  OC/OP: tongue and FOM soft, no lesions, OP clear  CN II-XII intact    A/P: 65M s/p DLE with biopsy 3/10 Doing well overall  - pain control  - home meds  - clear for discharge  - regular diet

## 2021-03-19 PROBLEM — C06.2 MALIGNANT NEOPLASM OF RETROMOLAR AREA: Chronic | Status: ACTIVE | Noted: 2021-01-01

## 2021-03-19 PROBLEM — U07.1 COVID-19: Chronic | Status: ACTIVE | Noted: 2021-01-01

## 2021-03-22 PROBLEM — C34.92 NON-SMALL CELL CANCER OF LEFT LUNG: Status: ACTIVE | Noted: 2020-01-01

## 2021-03-22 PROBLEM — C06.2 MALIGNANT NEOPLASM OF RETROMOLAR AREA: Status: ACTIVE | Noted: 2020-03-24

## 2021-03-22 NOTE — REASON FOR VISIT
[Subsequent Evaluation] : a subsequent evaluation for [FreeTextEntry2] : SCCa of the L mandible and here for bx result

## 2021-03-22 NOTE — PHYSICAL EXAM
[de-identified] : Posttreatment changes, no LAD. [Laryngoscopy Performed] : laryngoscopy was performed, see procedure section for findings [FreeTextEntry1] : Postbiopsy changes along the L. RMT with site still healing, no bleeding - significantly improved from original exams.  No other lesions.  Patient with trismus. [Normal] : no rashes

## 2021-03-22 NOTE — PROCEDURE
[Trismus] : trismus preventing mirror examination [None] : none [Flexible Endoscope] : examined with the flexible endoscope [Serial Number: ___] : Serial Number: [unfilled] [de-identified] : No lesions in the NPx, OPx, HPx or larynx.  Stable posttreatment changes, VC are mobile, airway patent.\par

## 2021-03-22 NOTE — HISTORY OF PRESENT ILLNESS
[de-identified] : 65 year old male, positive Hep C, completed RT at [facility] [place] ( Dr. Gutierrez) for lung SCCa. 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 History of smoking 1 pack per day x 40 years, quit early March. History of alcohol abuse with beer, stopped 14 years ago. Pt finished CXRT on 6/19/2020 with Dr. Pittman and Dr. House. Pt is eating by mouth and taking ensure supplements and soft diet .  Pt is experiencing trismus and using denture.  last pet ct 1/15/2021.\par Oral bx on 3/10/2021 benign. pt tolerates procedure well, but c.o constant pain without relief tylenol liquid. pt can't tolerate food due to the pain and burning and loss some wt after the bx.

## 2021-04-09 NOTE — H&P ADULT - PROBLEM SELECTOR PLAN 5
enterocolitis related ? pneumonia ? viral / covid pneumonia ? follow ct  chest, follow cultures, will keep on zosyn, one dose of vanco in the ER. follow covid -19 result.

## 2021-04-09 NOTE — CONSULT NOTE ADULT - ASSESSMENT
66 y/o male with PMH of lung ca, mandible ca, cachexia hep c, cirrhosis , Copd presents to the hospital with abd pain and weakness.  On arrival wbc 39.000 CPK 10. 1000 with trop 0.17.  Ekg no ischemic changes  CT of abd c/w colitis, views of lungs with ground glass opacities .      Mildly elevated trop in setting of renal failure/Non cardiac  No cardiac symptoms at this time  trend trop  Ekg in am  Echo to evaluate heart murmur      Rhabdomyolysis  s/p fall  Unclear if patient was on floor or not  Admit to SDU  Aggressively hydrate  ARF  given calcium, insulin  hold Kayexalate due to colitis  Mar cath  Strict  I&O  consider renal consult    Lung Cancer/Mandible/Cachecia  Need more information regarding stage/treatment etc    ID Colitis/? Pneumonia  Iv Zosyn  Clear liquid diet  consider ID consult & Gi consult    Hepatitis C/Cirrhosis  multiple ecchymotic areas on skin   Platelet count is ok INR midly elevated 1.24      64 y/o male with PMH of lung ca, mandible ca, cachexia hep c, cirrhosis , Copd presents to the hospital with abd pain and weakness and fall yesterday.   On arrival wbc 39.000 CPK 10. 1000 with trop 0.17.  Ekg no ischemic changes  CT of abd c/w colitis, views of lungs with ground glass opacities .      Mildly elevated trop in setting of renal failure/Non cardiac  No cardiac symptoms at this time  trend trop  Ekg in am  Echo to evaluate heart murmur      Rhabdomyolysis  s/p fall  Unclear if patient was on floor or not  Admit to SDU  Aggressively hydrate  ARF  given calcium, insulin  hold Kayexalate due to colitis  Mar cath  Strict  I&O  consider renal consult    Lung Cancer/Mandible/Cachecia  Need more information regarding stage/treatment etc    ID Colitis/? Pneumonia  Iv Zosyn  Clear liquid diet  consider ID consult & Gi consult    Hepatitis C/Cirrhosis  multiple ecchymotic areas on skin   Platelet count is ok INR midly elevated 1.24      64 y/o male with PMH of lung ca, mandible ca, cachexia hep c, cirrhosis , Copd presents to the hospital with abd pain and weakness and fall yesterday.   On arrival wbc 39.000 CPK 10. 1000 with trop 0.17.  Ekg no ischemic changes  CT of abd c/w colitis, views of lungs with ground glass opacities .      Mildly elevated trop in setting of renal failure/Non cardiac  No cardiac symptoms at this time  trend trop  Ekg in am  Echo to evaluate heart murmur    HTN  clonodine for now  consider beta blocker for cirrhosis and htn    Rhabdomyolysis  s/p fall  Unclear if patient was on floor or not  Admit to SDU  Aggressively hydrate  ARF  given calcium, insulin  hold Kayexalate due to colitis  Mar cath  Strict  I&O  consider renal consult    Lung Cancer/Mandible/Cachecia  Need more information regarding stage/treatment etc    ID Colitis/? Pneumonia  Iv Zosyn  Clear liquid diet  consider ID consult & Gi consult    Hepatitis C/Cirrhosis  multiple ecchymotic areas on skin   Platelet count is ok INR midly elevated 1.24

## 2021-04-09 NOTE — ED PROVIDER NOTE - OBJECTIVE STATEMENT
65yoM; with pmh signif for COPD, Mandibular Ca(not currently on chemo); now p/w abd pain--suprapubic, cramping, intermittent, since yesterday. denies associated n/v/d. denies f/c/s. denies dysuria, hematuria, frequency, urgency. denies sick contacts. denies cough. denies cp/sob/palp. reports falling yesterday because of the abdominal pain.  denies head trauma, but ecchymoses evident over head.  denies loc. denies n/v. denies numbness/tingling. denies dizziness before falling.    PMH: COPD, Mandibular CA  SOCIAL: No tobacco/illicit substance use/socialEtOH 65yoM; with pmh signif for COPD, Mandibular Ca(not currently on chemo); now p/w abd pain--suprapubic, cramping, intermittent, since yesterday. denies associated n/v/d. denies f/c/s. denies dysuria, hematuria, frequency, urgency. denies sick contacts. denies cough. denies cp/sob/palp. reports falling yesterday because of the abdominal pain.  denies head trauma, but ecchymoses evident over head.  denies loc. denies n/v. denies numbness/tingling. denies dizziness before falling.    PMH: COPD, Mandibular CA, Anemia, Aortic aneurysm, COPD GERD, Hep C w/cirrhosis, Malignant neoplasm of retromolar area (145.6) (C06.2)  Non-small cell cancer of left lung, PAD  SOCIAL: ex-smoker/illicit substance use/socialEtOH

## 2021-04-09 NOTE — H&P ADULT - NSHPPHYSICALEXAM_GEN_ALL_CORE
General: Malnourished male lying in bed not in distress  HEENT: AT, NC. PERRL. intact EOM. dry mucosa.  Neck: supple. no JVD.   Chest: scattered rhonchi bilaterally, no inter costal retractions.   Heart: S1,S2. RRR. no heart murmur. no edema.   Abdomen: soft. non-distended. pain to palpation in lower abd. area, some guarding, no RT.+ BS.   rectal : deferred by pt.   Ext: no calf tenderness. distal pulses intact.   Neuro: AAO x3. non focal, severely malnourished, no speech disorder, Cns ii to xii intact.   Skin: skin abrasion noted over anterior chest wall, warm and dry. no cyanosis.   Psych : tired looking,  no agitation, co-operative, no si/hi.

## 2021-04-09 NOTE — H&P ADULT - HISTORY OF PRESENT ILLNESS
pt. is a 66 y/o male with PMH Lung cancer as per pt. had surgery of his left lung about 1 year ago, no chemo, no radiation, Mandible cancer(  as per pt. had biopsy on March, 10th 2021 at [facility] )  hep c, Former IVDA who presents to the hospital with lower abd pain area pain x 1 day , somewhat constant, moderate to severe at times, which according to pt. started after he felt dizzy and fell on the ground yesterday ( was in his yard ) pt. not giving good history for how long he was on the floor. denies syncopy. no HA, no diarrhea. no n/v. pt. reports some cough and phlegm, no fever, no sick contact. In the ER pt. is in ARF/Hyperkalemic with Cpk of 10,100 and trop of 0.17. pt. reports no cp, some sob with cough.  pt. is a 64 y/o male with PMH of Lung cancer as per pt. had surgery of his left lung about 1 year ago, no chemo, no radiation, Mandible cancer(  as per pt. had biopsy on March, 10th 2021 at [facility] )  hep c, Former IVDA , emphysema who presents to the hospital with lower abd pain area pain x 1 day , somewhat constant, moderate to severe at times, which according to pt. started after he felt dizzy and fell on the ground yesterday ( was in his yard ) pt. not giving good history for how long he was on the floor. denies syncopy. no HA, no diarrhea. no melena, no hemoptysis, no hematemesis, no n/v. pt. reports some cough and phlegm, no fever, no sick contact. In the ER pt. is in ARF/Hyperkalemic with Cpk of 10,100 and trop of 0.17. pt. reports no cp, some sob with cough. pt. does not appear to be a good historian.  pt. is a 64 y/o male with PMH of Lung cancer as per pt. had surgery of his rt. lung about 1 year ago, no chemo, no radiation, Mandible cancer(  as per pt. had biopsy on March, 10th 2021 at [facility] )  hep c, Former IVDA , emphysema who presents to the hospital with lower abd pain area pain x 1 day , somewhat constant, moderate to severe at times, which according to pt. started after he felt dizzy and fell on the ground yesterday ( was in his yard ) pt. not giving good history for how long he was on the floor. denies syncopy. no HA, no diarrhea. no melena, no hemoptysis, no hematemesis, no n/v. pt. reports some cough and phlegm, no fever, no sick contact. In the ER pt. is in ARF/Hyperkalemic with Cpk of 10,100 and trop of 0.17. pt. reports no cp, some sob with cough. pt. does not appear to be a good historian.

## 2021-04-09 NOTE — H&P ADULT - PROBLEM SELECTOR PLAN 3
Pt. not giving good history for how long he was on the floor, Rhabdomyolysis likely related to his fall and being on the floor. iv fluids with bicarb. Pt. not giving good history for how long he was on the floor, Rhabdomyolysis likely related to his fall and being on the floor. iv fluids with bicarb. ct head , neck, maxillo facial no acute findings.

## 2021-04-09 NOTE — H&P ADULT - PROBLEM SELECTOR PLAN 6
pt. reported dizziness, fall, no syncopy, ct head , neck, maxillo facial no acute findings, will be on tele, trend trop, first 0.17 likely renally related, pt. with no cp. follow echo, cardiology team following. pt. reported dizziness, fall, no syncopy, ct head , neck, maxillo facial no acute findings, will be on tele, trend trop, first 0.17 likely renally related, pt. with no cp. follow echo, ekg nsr 90 no acute changes. cardiology team following.

## 2021-04-09 NOTE — CONSULT NOTE ADULT - SUBJECTIVE AND OBJECTIVE BOX
Patient is a very poor historian    This is a 64y/o male with PMH Lung cancer, Mandible cancer, hep c, Former IVDA who presents to the hospital with abd pain, weakness and s/p fall yesterday without syncope.  Presents in ARF/Hyperkalemic with Cpk of 10,100 and trop of 0.17.  Patient denies any hx of cardiac dx, no cp sob or palp      PAST MEDICAL HISTORY  Mandible Cancer  Lung cancer  Hepatitic C  former IVDA  Covid 12/21  COPD     PSH  Mandible bx    HOME MEDS  ALBUTEROL  Denies other medications    ALLERGIES:  NKDA    FAMILY HISTORY:  Non contributory    SOCIAL HISTORY:  Former smoker, Denies etoh   but still has good reactions with ex wife  States we can talk to ex wife or son  Has one won    REVIEW OF SYSTEMS:  CONSTITUTIONAL: No fever, weight loss,  ++ fatigue  + fall  EYES: No eye pain, visual disturbances, or discharge  ENMT:  No difficulty hearing, tinnitus, vertigo; No sinus or throat pain  NECK: No pain or stiffness  RESPIRATORY: No cough, wheezing, chills or hemoptysis; No Shortness of Breath  CARDIOVASCULAR: No chest pain, palpitations, passing out, dizziness, or leg swelling  GASTROINTESTINAL: No diarrhea  ++ pain in right lower quad  GENITOURINARY: No dysuria, frequency, hematuria, or incontinence  Denies any dysuria  NEUROLOGICAL: No headaches, memory loss, loss of strength, numbness, or tremors  SKIN: No itching, burning, rashes, or lesions   LYMPH Nodes: No enlarged glands  ENDOCRINE: No heat or cold intolerance; No hair loss  MUSCULOSKELETAL: No joint pain or swelling; No muscle, back, or extremity pain  PSYCHIATRIC: No depression, anxiety, mood swings, or difficulty sleeping      Vital Signs Last 24 Hrs  T(C): 36.4 (09 Apr 2021 14:00), Max: 36.4 (09 Apr 2021 14:00)  T(F): 97.6 (09 Apr 2021 14:00), Max: 97.6 (09 Apr 2021 14:00)  HR: 96 (09 Apr 2021 18:05) (90 - 101)  BP: 166/73 (09 Apr 2021 18:05) (155/67 - 172/74)  BP(mean): --  RR: 19 (09 Apr 2021 18:05) (18 - 20)  SpO2: 98% (09 Apr 2021 18:05) (91% - 98%)    Daily Height in cm: 175.26 (09 Apr 2021 14:00)      I&O's Detail    PHYSICAL EXAM:  Appearance: Thin Cachectic  moderately ill appearing	  HEENT:   Normal oral mucosa, PERRL, EOMI, sclera non-icteric	  Lymphatic: No cervical lymphadenopathy  Cardiovascular: Normal S1 S2 Regular  2/6 mid peaking murmur with radiation to axilla  Respiratory: Lungs clear to auscultation	  Psychiatry: A & O x 3, Mood & affect Flat  Gastrointestinal:  Soft exquisite tenderness in right lower quad mild guarding	  Skin:  Multiple ecchymotic area on chest wall some open  Neurologic: Alert oriented  Delayed thought processes weak  symmetrical weakness of all extremities  Extremities: Normal range of motion, No clubbing, cyanosis or edema  Vascular:  No edema ext warm      ECG:  Sinus no ischemic changes    LABS:                        12.3   39.28 )-----------( 248      ( 09 Apr 2021 15:27 )             39.8     04-09    137  |  99  |  57.0<H>  ----------------------------<  106<H>  6.3<HH>   |  19.0<L>  |  2.44<H>    Ca    9.4      09 Apr 2021 18:13    TPro  7.6  /  Alb  3.9  /  TBili  1.2  /  DBili  x   /  AST  337<H>  /  ALT  130<H>  /  AlkPhos  278<H>  04-09    CARDIAC MARKERS ( 09 Apr 2021 15:27 )  x     / 0.17 ng/mL / [id number] U/L / x     / 250.4 ng/mL      PT/INR - ( 09 Apr 2021 15:27 )   PT: 14.2 sec;   INR: 1.24 ratio     PTT - ( 09 Apr 2021 15:27 )  PTT:33.7 sec  I&O's Summary  BNPSerum Pro-Brain Natriuretic Peptide: 4599 pg/mL (04-09 @ 18:13)  RADIOLOGY & ADDITIONAL STUDIES:    < from: Xray Chest 1 View AP/PA. (04.09.21 @ 16:24) >  IMPRESSION: No acute cardiopulmonary disease process.    CT of abd< from: CT Maxillofacial No Cont (04.09.21 @ 16:55) >    CT brain:    No hydrocephalus, mass effect, midline shift, acute intracranial hemorrhage, or brain edema. Small chronic high left parietal infarct.  No displaced calvarial fracture.  CT maxillofacial bones:  No acute fracture or traumatic subluxation.  Paranasal sinuses and mastoid air cells clear.  The globes, extraocular muscles, retrobulbar fat, and optic nerves are unremarkable. No radiopaque foreign body in the orbits.  CT cervical spine:  No acute fracture or traumatic subluxation. No prevertebral soft tissue swelling. Vertebral body height and alignment maintained. Straightening of the normal cervical lordosis. No disc space narrowing at C5-C6.  There are multilevel degenerative changes characterized by degenerative disc disease and facet and uncinate hypertrophy. This results in multilevel spinal canal stenosis and multilevel neural foraminal narrowing.  Marked emphysematous changes of the visualized lungs.    IMPRESSION:    CT brain:  No acute intracranial hemorrhage, brain edema, or mass effect.  No displaced calvarial fracture.    CT maxillofacial bones:  No acute fracture or traumatic subluxation.  Paranasal sinuses and mastoid air cells clear.  Intraorbital contents unremarkable.    CT cervical spine:  No acute fracture or traumatic subluxation.  No prevertebral soft tissue swelling.  Degenerative changes.  Extensive emphysematous changes of the visualized lungs.    CT Abdomen and Pelvis No Cont (04.09.21 @ 16:55) >  IMPRESSION:  Colonic diverticulosis without evidence for diverticulitis. The appendix appears unremarkable. Moderate to large amount of stool in the ascending colon. No bowel obstruction, or grossly thickened bowel wall. Hazy densities adjacent to the proximal ascending colon and the rigt lower mesentery may represent nonspecific enterocolitis.    Cholelithiasis.  Mild patchy groundglass densities in both lungs for which clinical correlation with pneumonia is recommended. Atypical viral pneumonia such as Covid cannot be excluded.  A few small lung nodules in the right lower lobe measuring up to 6 mm; follow-up chest CT in 3 months maybe pursued to ensure stability.  Other findings as above.             Patient is a very poor historian    This is a 64 y/o male with PMH Lung cancer, Mandible cancer, hep c, Former IVDA who presents to the hospital with abd pain, weakness and s/p fall yesterday without syncope.  Presents in ARF/Hyperkalemic with Cpk of 10,100 and trop of 0.17.  Patient denies any hx of cardiac dx, no cp sob or palp      PAST MEDICAL HISTORY  Mandible Cancer  Lung cancer  Hepatitic C  former IVDA  Covid 12/21  COPD     PSH  Mandible bx    HOME MEDS  ALBUTEROL  Denies other medications    ALLERGIES:  NKDA    FAMILY HISTORY:  Non contributory    SOCIAL HISTORY:  Former smoker, Denies etoh   but still has good reactions with ex wife  States we can talk to ex wife or son  Has one won    REVIEW OF SYSTEMS:  CONSTITUTIONAL: No fever, weight loss,  ++ fatigue  + fall  EYES: No eye pain, visual disturbances, or discharge  ENMT:  No difficulty hearing, tinnitus, vertigo; No sinus or throat pain  NECK: No pain or stiffness  RESPIRATORY: No cough, wheezing, chills or hemoptysis; No Shortness of Breath  CARDIOVASCULAR: No chest pain, palpitations, passing out, dizziness, or leg swelling  GASTROINTESTINAL: No diarrhea  ++ pain in right lower quad  GENITOURINARY: No dysuria, frequency, hematuria, or incontinence  Denies any dysuria  NEUROLOGICAL: No headaches, memory loss, loss of strength, numbness, or tremors  SKIN: No itching, burning, rashes, or lesions   LYMPH Nodes: No enlarged glands  ENDOCRINE: No heat or cold intolerance; No hair loss  MUSCULOSKELETAL: No joint pain or swelling; No muscle, back, or extremity pain  PSYCHIATRIC: No depression, anxiety, mood swings, or difficulty sleeping      Vital Signs Last 24 Hrs  T(C): 36.4 (09 Apr 2021 14:00), Max: 36.4 (09 Apr 2021 14:00)  T(F): 97.6 (09 Apr 2021 14:00), Max: 97.6 (09 Apr 2021 14:00)  HR: 96 (09 Apr 2021 18:05) (90 - 101)  BP: 166/73 (09 Apr 2021 18:05) (155/67 - 172/74)  BP(mean): --  RR: 19 (09 Apr 2021 18:05) (18 - 20)  SpO2: 98% (09 Apr 2021 18:05) (91% - 98%)    Daily Height in cm: 175.26 (09 Apr 2021 14:00)      I&O's Detail    PHYSICAL EXAM:  Appearance: Thin Cachectic  moderately ill appearing	  HEENT:   Normal oral mucosa, PERRL, EOMI, sclera non-icteric	  Lymphatic: No cervical lymphadenopathy  Cardiovascular: Normal S1 S2 Regular  2/6 mid peaking murmur with radiation to axilla  Respiratory: Lungs clear to auscultation	  Psychiatry: A & O x 3, Mood & affect Flat  Gastrointestinal:  Soft exquisite tenderness in right lower quad mild guarding	  Skin:  Multiple ecchymotic area on chest wall some open  Neurologic: Alert oriented  Delayed thought processes weak  symmetrical weakness of all extremities  Extremities: Normal range of motion, No clubbing, cyanosis or edema  Vascular:  No edema ext warm      ECG:  Sinus no ischemic changes    LABS:                        12.3   39.28 )-----------( 248      ( 09 Apr 2021 15:27 )             39.8     04-09    137  |  99  |  57.0<H>  ----------------------------<  106<H>  6.3<HH>   |  19.0<L>  |  2.44<H>    Ca    9.4      09 Apr 2021 18:13    TPro  7.6  /  Alb  3.9  /  TBili  1.2  /  DBili  x   /  AST  337<H>  /  ALT  130<H>  /  AlkPhos  278<H>  04-09    CARDIAC MARKERS ( 09 Apr 2021 15:27 )  x     / 0.17 ng/mL / [id number] U/L / x     / 250.4 ng/mL      PT/INR - ( 09 Apr 2021 15:27 )   PT: 14.2 sec;   INR: 1.24 ratio     PTT - ( 09 Apr 2021 15:27 )  PTT:33.7 sec  I&O's Summary  BNPSerum Pro-Brain Natriuretic Peptide: 4599 pg/mL (04-09 @ 18:13)  RADIOLOGY & ADDITIONAL STUDIES:    < from: Xray Chest 1 View AP/PA. (04.09.21 @ 16:24) >  IMPRESSION: No acute cardiopulmonary disease process.    CT of abd< from: CT Maxillofacial No Cont (04.09.21 @ 16:55) >    CT brain:    No hydrocephalus, mass effect, midline shift, acute intracranial hemorrhage, or brain edema. Small chronic high left parietal infarct.  No displaced calvarial fracture.  CT maxillofacial bones:  No acute fracture or traumatic subluxation.  Paranasal sinuses and mastoid air cells clear.  The globes, extraocular muscles, retrobulbar fat, and optic nerves are unremarkable. No radiopaque foreign body in the orbits.  CT cervical spine:  No acute fracture or traumatic subluxation. No prevertebral soft tissue swelling. Vertebral body height and alignment maintained. Straightening of the normal cervical lordosis. No disc space narrowing at C5-C6.  There are multilevel degenerative changes characterized by degenerative disc disease and facet and uncinate hypertrophy. This results in multilevel spinal canal stenosis and multilevel neural foraminal narrowing.  Marked emphysematous changes of the visualized lungs.    IMPRESSION:    CT brain:  No acute intracranial hemorrhage, brain edema, or mass effect.  No displaced calvarial fracture.    CT maxillofacial bones:  No acute fracture or traumatic subluxation.  Paranasal sinuses and mastoid air cells clear.  Intraorbital contents unremarkable.    CT cervical spine:  No acute fracture or traumatic subluxation.  No prevertebral soft tissue swelling.  Degenerative changes.  Extensive emphysematous changes of the visualized lungs.    CT Abdomen and Pelvis No Cont (04.09.21 @ 16:55) >  IMPRESSION:  Colonic diverticulosis without evidence for diverticulitis. The appendix appears unremarkable. Moderate to large amount of stool in the ascending colon. No bowel obstruction, or grossly thickened bowel wall. Hazy densities adjacent to the proximal ascending colon and the rigt lower mesentery may represent nonspecific enterocolitis.    Cholelithiasis.  Mild patchy groundglass densities in both lungs for which clinical correlation with pneumonia is recommended. Atypical viral pneumonia such as Covid cannot be excluded.  A few small lung nodules in the right lower lobe measuring up to 6 mm; follow-up chest CT in 3 months maybe pursued to ensure stability.  Other findings as above.

## 2021-04-09 NOTE — H&P ADULT - PROBLEM SELECTOR PLAN 1
ALMA, Very likely pre renal, dehydration , Rhabdo contributing . acidosis, which is improving with iv fluids, will continue iv fluids, with bicarb, follow repeat labs. ER physician has discussed case with [facility] nephrologist on call.

## 2021-04-09 NOTE — CONSULT NOTE ADULT - ATTENDING COMMENTS
pt seen and examined along with NP  Spoke with Dr. Wheeler and Dr. Mckeon.  Poor historian with recent fall, unclear how long he was on the floor in the garage. Pt c/o abdominal pain. CT W/O contrast reviewed.  Pt also has rhabdo based on labs with elevated K and Cr. IVF, monitor electrolytes q6h. Would avoid Kxylate with abdominal pain. Agree with CT CT, GGO, possible COVID,   Elevation in Troponin is related to rhabdo and not from ACS.  Pt with cardiomegaly on CXR and by exam.   Agree with TTE.  We will follow with you.

## 2021-04-09 NOTE — ED ADULT NURSE NOTE - INTERVENTIONS DEFINITIONS
[place] to call system/Call bell, personal items and telephone within reach/Non-slip footwear when patient is off stretcher/Stretcher in lowest position, wheels locked, appropriate side rails in place

## 2021-04-09 NOTE — ED PROVIDER NOTE - CLINICAL SUMMARY MEDICAL DECISION MAKING FREE TEXT BOX
patient arrived with abd pain, found to be in ALMA, acute liver elevations, found to have rhabdo, will hydrate. given Bicarb/Ca/D50/Insulin for hyperkalemia.  called nephrology and cardiology. will admit for further treatment.

## 2021-04-09 NOTE — ED ADULT NURSE NOTE - OBJECTIVE STATEMENT
assumed care of pt @ this time. received pt a&ox3, no acute distress, breaths even and unlabored c/o L jaw pain starting today. reports he had biopsy done on 3/10/21 on L jaw. 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 no swelling or bruising noted. pt denies pain anyplace else. pt reports he lost his balance yesterday while gardening and fell, hitting his forehead on a tree. denies loc. denies blood thinners. reports may have hit his face on tree also. no obvious injury or deformity noted. assumed care of pt @ this time. received pt a&ox3, no acute distress, breaths even and unlabored c/o L jaw pain starting today. reports he had biopsy done on 3/10/21 on L jaw. 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 no swelling noted. small amount of bruising noted to forehead. pt denies pain anyplace else. pt reports he lost his balance yesterday while gardening and fell, hitting his forehead on a tree. denies loc. denies blood thinners. reports may have hit his face on tree also. no obvious injury or deformity noted. also c/o RLQ abdominal pain.

## 2021-04-09 NOTE — H&P ADULT - ASSESSMENT
pt. is a 64 y/o male with PMH of Lung cancer as per pt. had surgery of his rt. lung about 1 year ago, no chemo, no radiation, Mandible cancer(  as per pt. had biopsy on March, 10th 2021 at [facility] )  hep c, Former IVDA , emphysema who presents to the hospital with lower abd pain area pain x 1 day , somewhat constant, moderate to severe at times, which according to pt. started after he felt dizzy and fell on the ground yesterday ( was in his yard ) pt. not giving good history for how long he was on the floor. denies syncopy. no HA, no diarrhea. no melena, no hemoptysis, no hematemesis, no n/v. pt. reports some cough and phlegm, no fever, no sick contact. In the ER pt. is in ARF/Hyperkalemic with Cpk of 10,100 and trop of 0.17. pt. reports no cp, some sob with cough. pt. does not appear to be a good historian.

## 2021-04-09 NOTE — ED PROVIDER NOTE - PHYSICAL EXAMINATION
General:     NAD, well-nourished, well-appearing  Head:     NC/AT, EOMI, oral mucosa moist  Neck:     trachea midline  Lungs:     CTA b/l, no w/r/r  CVS:     S1S2, RRR, no m/g/r  CHEST WALL: nt over chest wall.   Abd:     +BS, TTP @ RLQ, negative rebound, s/nd, no organomegaly  Ext:    2+ radial and pedal pulses, no c/c/e  Skin: ecchymoses over anterior chest, arms, and forehead  Neuro: AAOx3, no sensory/motor deficits

## 2021-04-09 NOTE — ED PROVIDER NOTE - CARE PLAN
Principal Discharge DX:	ALMA (acute kidney injury)  Secondary Diagnosis:	Elevated liver enzymes  Secondary Diagnosis:	Leukocytosis   Principal Discharge DX:	ALMA (acute kidney injury)  Secondary Diagnosis:	Elevated liver enzymes  Secondary Diagnosis:	Leukocytosis  Secondary Diagnosis:	Rhabdomyolysis   Principal Discharge DX:	ALMA (acute kidney injury)  Secondary Diagnosis:	Elevated liver enzymes  Secondary Diagnosis:	Leukocytosis  Secondary Diagnosis:	Rhabdomyolysis  Secondary Diagnosis:	Hyperkalemia

## 2021-04-09 NOTE — ED PROVIDER NOTE - NS ED ROS FT
Constitutional: (-) fever  (-)chills  (-)sweats  Eyes/ENT: (-)   Cardiovascular: (-) chest pain, (-) palpitations (-) edema   Respiratory: (-) cough, (-) shortness of breath   Gastrointestinal: (-)nausea  (-)vomiting, (-) diarrhea  (+) abdominal pain   :  (-)dysuria, (-)frequency, (-)urgency, (-)hematuria  Musculoskeletal: (-) neck pain, (-) back pain, (-) joint pain  Integumentary: (-) rash, (-) edema  Neurological: (-) headache, (-) altered mental status  (-)LOC

## 2021-04-10 NOTE — CONSULT NOTE ADULT - ASSESSMENT
64yo male with extensive medical comorbidities including cirrhosis and active oropharyngeal cancer on whom surgery is consulted to evaluate acute abdominal pain. Patient  with lactic acidosis, leukocytosis, and physical exam findings highly concerning for acute abdominal process. Patient high risk for surgery and goals of care conversation had with family. After extensive counseling. Patient and family declined operative intervention at this time. Clarified that patient is also DNR and DNI    - transfer to sicu for resuscitation and ongoing pain control  - broad spectrum antibiotics.   - DNR DNI  - palliative care consult.

## 2021-04-10 NOTE — CONSULT NOTE ADULT - ATTENDING COMMENTS
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   Pul On HFNC and increasework of breathing  CV RRR  GI pain out of proportion  Plan   1. Had discussion with the family and would not recommend surgery. 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 The patient has multiple large Blebs and bullous which would lead to prolonged ventilation. Patient and family declined operative intervention and opted for comfort measures only     code [id number] pt seen and examined by me  had extensive conversation with patient and ex-wife who is medical POA  both agreed that patient code status is DNR/DNI  ex-wife was able to provide medical history as patient was unable to -her decision was to pursue conservative mgmt only and requested evaluation by palliative and potentially hospice  pt will go to SICU until final decisions are made regarding hospice

## 2021-04-10 NOTE — PATIENT PROFILE ADULT - NSPROHMSYMPCOND_GEN_A_NUR
Outgoing call made to number on file, no contact made,  VM left for parent to call and schedule appt for tomorrow since noticed pt is at ER at this time none

## 2021-04-10 NOTE — CONSULT NOTE ADULT - REASON FOR ADMISSION
ALMA/ hyperkalemia/ Rhabdomyolysis
Weakness

## 2021-04-10 NOTE — DISCHARGE NOTE FOR THE EXPIRED PATIENT - HOSPITAL COURSE
pt. is a 64 y/o male with PMH of Lung cancer as per pt. had surgery of his rt. lung about 1 year ago, no chemo, no radiation, Mandible cancer(  as per pt. had biopsy on  at [facility] )  hep c, Former IVDA , emphysema who presents to the hospital with lower abd pain area pain x 1 day , somewhat constant, moderate to severe at times, which according to pt. started after he felt dizzy and fell on the ground yesterday ( was in his yard ) pt. not giving good history for how long he was on the floor. denies syncopy. no HA, no diarrhea. no melena, no hemoptysis, no hematemesis, no n/v. pt. reports some cough and phlegm, no fever, no sick contact. In the ER pt. is in ARF/Hyperkalemic with Cpk of 10,100 and trop of 0.17. pt. reports no cp, some sob with cough. Admitted to ICU.  Patient has with Stage IV squamous cell carcinoma, liver cirrhosis, and now with likely mesenteric ischemia.  Pt with severe abdominal pain with signs of peritonitis.  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  Pt and family were initially offered a surgical procedure but the family and patient were never agreeable to surgery.  After the surgical team reviewed images, labs, and reassessed patient status and history, they also concluded that he was not a candidate for OR intervention.  MOLST form was completed by patient with his family present.  Palliative care was consulted. Family agreed with comfort measures.    Pt is DNR/DNI with full comfort measures. 66yo M with PMH of Lung CA, Mandibular CA, Hep C, Former IVDA, and Emphysema p/w abdominal pain with intestinal ischemia. Palliative consulted for complex symptom management and medical decision making in likely life-limiting illness.  Was placed on  Dilaudid infusion, recommend 3mg/hr. Was placed on 5 tower and  at 19:17

## 2021-04-10 NOTE — GOALS OF CARE CONVERSATION - ADVANCED CARE PLANNING - CONVERSATION DETAILS
Patient with Stage IV squamous cell carcinoma, liver cirrhosis, and now with likely mesenteric ischemia.  Pt with severe abdominal pain with signs of peritonitis.  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  Pt and family were initially offered a surgical procedure but the family and patient were never agreeable to surgery.  After the surgical team reviewed images, labs, and reassessed patient status and history, they also concluded that he was not a candidate for OR intervention.  MOLST form was completed by patient with his family present.  Palliative care was consulted.  Pt is DNR/DNI with full comfort measures.

## 2021-04-10 NOTE — PROGRESS NOTE ADULT - SUBJECTIVE AND OBJECTIVE BOX
CC: Jaw cancer, Lung cancer. Liver cirrhosis , hep C, former iV drug use.  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   Overall prognosis is poor.    Son and Ex wife in room and will like go ahead with comfort measures, hospice care.    HPI:  pt. is a 66 y/o male with PMH of Lung cancer as per pt. had surgery of his rt. lung about 1 year ago, no chemo, no radiation, Mandible cancer(  as per pt. had biopsy on  at [facility] )  hep c, Former IVDA , emphysema who presents to the hospital with lower abd pain area pain x 1 day , somewhat constant, moderate to severe at times, which according to pt. started after he felt dizzy and fell on the ground yesterday ( was in his yard ) pt. not giving good history for how long he was on the floor. denies syncopy. no HA, no diarrhea. no melena, no hemoptysis, no hematemesis, no n/v. pt. reports some cough and phlegm, no fever, no sick contact. In the ER pt. is in ARF/Hyperkalemic with Cpk of 10,100 and trop of 0.17. pt. reports no cp, some sob with cough. pt. does not appear to be a good historian.  (2021 20:16)    REVIEW OF SYSTEMS:    Patient denied fever, chills, abdominal pain, nausea, vomiting, cough, shortness of breath, chest pain or palpitations    Vital Signs Last 24 Hrs  T(C): 37.1 (10 Apr 2021 12:00), Max: 37.1 (10 Apr 2021 12:00)  T(F): 98.8 (10 Apr 2021 12:00), Max: 98.8 (10 Apr 2021 12:00)  HR: 91 (10 Apr 2021 11:00) (89 - 103)  BP: 70/29 (10 Apr 2021 11:00) (70/29 - 172/74)  BP(mean): 44 (10 Apr 2021 11:00) (44 - 108)  RR: 20 (10 Apr 2021 11:00) (14 - 26)  SpO2: 91% (10 Apr 2021 11:00) (83% - 98%)I&O's Summary    10 Apr 2021 07:01  -  10 Apr 2021 12:30  --------------------------------------------------------  IN: 698 mL / OUT: 425 mL / NET: 273 mL      PHYSICAL EXAM:  GENERAL: cachexia   HEENT: PERRL, +EOMI, anicteric, no [place]  NECK: Supple, No JVD   CHEST/LUNG: CTA bilaterally; Normal effort  HEART: S1S2 Normal intensity, no murmurs, gallops or rubs noted  ABDOMEN: Soft, BS Normoactive, NT, ND, no HSM noted  EXTREMITIES:  2+ radial and DP pulses noted, no clubbing, cyanosis, or edema noted, Limited mobility, Bed bound   SKIN: No rashes or lesions noted  NEURO: A&Ox3,  noted, CN II-XII intact  PSYCH: normal mood and affect; insight/judgement appropriate  LABS:                        12.0   32.33 )-----------( 165      ( 10 Apr 2021 08:58 )             37.5     04-10    142  |  103  |  63.0<H>  ----------------------------<  87  5.1   |  21.0<L>  |  2.65<H>    Ca    7.7<L>      10 Apr 2021 08:58  Phos  5.4     04-10  Mg     2.3     04-10    TPro  5.9<L>  /  Alb  3.1<L>  /  TBili  1.6  /  DBili  x   /  AST  317<H>  /  ALT  131<H>  /  AlkPhos  176<H>  04-10    PT/INR - ( 10 Apr 2021 08:58 )   PT: 16.4 sec;   INR: 1.44 ratio         PTT - ( 10 Apr 2021 08:58 )  PTT:30.5 sec  Urinalysis Basic - ( 10 Apr 2021 03:02 )    Color: Yellow / Appearance: Clear / S.015 / pH: x  Gluc: x / Ketone: Negative  / Bili: Negative / Urobili: Negative mg/dL   Blood: x / Protein: 30 mg/dL / Nitrite: Negative   Leuk Esterase: Negative / RBC: 3-5 /HPF / WBC 0-2   Sq Epi: x / Non Sq Epi: Occasional / Bacteria: Occasional      RADIOLOGY & ADDITIONAL TESTS:    MEDICATIONS:  MEDICATIONS  (STANDING):  albuterol/ipratropium for Nebulization 3 milliLiter(s) Nebulizer every 6 hours  budesonide  80 MICROgram(s)/formoterol 4.5 MICROgram(s) Inhaler 2 Puff(s) Inhalation two times a day  HYDROmorphone  Injectable 6 milliGRAM(s) IV Push once  HYDROmorphone Infusion 4 mG/Hr (4 mL/Hr) IV Continuous <Continuous>  multiple electrolytes Injection Type 1 1000 milliLiter(s) (75 mL/Hr) IV Continuous <Continuous>    MEDICATIONS  (PRN):  HYDROmorphone  Injectable 1 milliGRAM(s) IV Push every 3 hours PRN Moderate Pain (4 - 6)  HYDROmorphone  Injectable 2 milliGRAM(s) IV Push every 3 hours PRN Severe Pain (7 - 10)  LORazepam   Injectable 2 milliGRAM(s) IV Push every 2 hours PRN Pain, dyspnea  ondansetron Injectable 4 milliGRAM(s) IV Push every 6 hours PRN Nausea and/or Vomiting

## 2021-04-10 NOTE — CONSULT NOTE ADULT - SUBJECTIVE AND OBJECTIVE BOX
Patient is a 65y old  Male who presents with a chief complaint of ALMA/ hyperkalemia/ Rhabdomyolysis (10 Apr 2021 04:45)      HPI:  pt. is a 64 y/o male with PMH of Lung cancer as per pt. had surgery of his rt. lung about 1 year ago, no chemo, no radiation, Mandible cancer(  as per pt. had biopsy on March, 10th 2021 at [facility] )  hep c, Former IVDA , emphysema who presents to the hospital with lower abd pain area pain x 1 day , somewhat constant, moderate to severe at times, which according to pt. started after he felt dizzy and fell on the ground yesterday ( was in his yard ) pt. not giving good history for how long he was on the floor. denies syncopy. no HA, no diarrhea. no melena, no hemoptysis, no hematemesis, no n/v. pt. reports some cough and phlegm, no fever, no sick contact. In the ER pt. is in ARF/Hyperkalemic with Cpk of 10,100 and trop of 0.17. pt. reports no cp, some sob with cough. pt. does not appear to be a good historian.  (09 Apr 2021 20:16). Pt.  with marked tachypnea presently in SICU on high flow oxygen. Had CT Angio of abdomen and pelvis which showed extensive celiac and SMA stenosis likely causing ischemic enterocolitis. Pt is presently a DNR and will soon be designated as a Hospice patient and is presently in extremis.      REVIEW OF SYSTEMS:   Unobtainable in this pt. given his current clinical condition.    PAST MEDICAL & SURGICAL HISTORY:  Jaw cancer    Lung cancer    H/O emphysema    History of lung surgery        FAMILY HISTORY:  No pertinent family history in first degree relatives        SOCIAL HISTORY:  Smoking Status: [ ] Current, [x ] Former, [ ] Never  Pack Years: > 20. No ETOH or drug abuse history.    MEDICATIONS:  MEDICATIONS  (STANDING):  albuterol/ipratropium for Nebulization 3 milliLiter(s) Nebulizer every 6 hours  budesonide  80 MICROgram(s)/formoterol 4.5 MICROgram(s) Inhaler 2 Puff(s) Inhalation two times a day  heparin   Injectable 5000 Unit(s) SubCutaneous every 8 hours  lactobacillus acidophilus 1 Tablet(s) Oral two times a day  morphine  Infusion 6 mG/Hr (6 mL/Hr) IV Continuous <Continuous>  multiple electrolytes Injection Type 1 1000 milliLiter(s) (125 mL/Hr) IV Continuous <Continuous>  pantoprazole  Injectable 40 milliGRAM(s) IV Push daily  piperacillin/tazobactam IVPB.. 3.375 Gram(s) IV Intermittent every 12 hours  piperacillin/tazobactam IVPB..        MEDICATIONS  (PRN):  HYDROmorphone  Injectable 1 milliGRAM(s) IV Push every 3 hours PRN Moderate Pain (4 - 6)  HYDROmorphone  Injectable 2 milliGRAM(s) IV Push every 3 hours PRN Severe Pain (7 - 10)  ondansetron Injectable 4 milliGRAM(s) IV Push every 6 hours PRN Nausea and/or Vomiting      Allergies    No Known Allergies    Intolerances        Vital Signs Last 24 Hrs  T(C): 36.5 (10 Apr 2021 07:14), Max: 36.7 (10 Apr 2021 04:41)  T(F): 97.7 (10 Apr 2021 07:14), Max: 98.1 (10 Apr 2021 04:41)  HR: 96 (10 Apr 2021 10:00) (89 - 103)  BP: 108/65 (10 Apr 2021 10:00) (108/65 - 172/74)  BP(mean): 75 (10 Apr 2021 10:00) (75 - 108)  RR: 26 (10 Apr 2021 10:00) (14 - 26)  SpO2: 92% (10 Apr 2021 10:00) (83% - 98%)    04-10 @ 07:01  -  04-10 @ 10:32  --------------------------------------------------------  IN: 613 mL / OUT: 400 mL / NET: 213 mL          PHYSICAL EXAM:  Pt. seen with ex-wife and son at the bedside in SICU.  General: Well developed; cachectic on high flow oxygen, tachypneic, in some distress.  HEENT: MMM, conjunctiva pink and sclera anicteric.  Lungs: Markedly decreased breath sounds bilaterally.  Cor: RRR S1, S2 only.  Gastrointestinal: Abdomen: Soft with diffuse significant lower abdominal tenderness w/o rebound or guarding or HSM.  ODILON: Pt. and family refused.  Neurological: Alert but lethargic  Skin: Warm and dry. No obvious rash      LABS:                        12.0   x     )-----------( 165      ( 10 Apr 2021 08:58 )             37.5     04-10    142  |  103  |  63.0<H>  ----------------------------<  87  5.1   |  21.0<L>  |  2.65<H>    Ca    7.7<L>      10 Apr 2021 08:58  Phos  5.4     04-10  Mg     2.3     04-10    TPro  5.9<L>  /  Alb  3.1<L>  /  TBili  1.6  /  DBili  x   /  AST  317<H>  /  ALT  131<H>  /  AlkPhos  176<H>  04-10          RADIOLOGY & ADDITIONAL STUDIES: CT results noted.

## 2021-04-10 NOTE — CHART NOTE - NSCHARTNOTEFT_GEN_A_CORE
Patient in need of emergent CTA. Cr elevation acknowledged, need to rule out ischemia takes priority. Will get renal consult as per radiology request.

## 2021-04-10 NOTE — GOALS OF CARE CONVERSATION - ADVANCED CARE PLANNING - TREATMENT GUIDELINES
DNI/DNR Order/Comfort measures only/No blood draws/No artificial nutrition/No antibiotics/IV fluid trial

## 2021-04-10 NOTE — CONSULT NOTE ADULT - ASSESSMENT
Pt with intestinal ischemia secondary to celiac and SMA stenosis. Not a candidate for colonoscopy or any type of surgical +/or vascular interventions. Agree with planned Hospice designation. No plans or need for continued GI f/u. Signing off. Reconsult as needed. Thank you.

## 2021-04-10 NOTE — CONSULT NOTE ADULT - SUBJECTIVE AND OBJECTIVE BOX
[facility] Geriatrics and Palliative Care  Taras Rivas, Palliative Care Attending  Contact Info: message on [employer] Teams (Taras Rivas)    Patient reaffirms symptom-directed care/comfort measures approach. Discussed with son and ex-wife at bedside. SICU completed MOLST w/ DNR/DNI and placed in chart. Flowsheet/PRN use reviewed; has received ~5.5mg IV Dilaudid and started on Morphine gtt (currently at 20mg/hr) in past 12hrs. Patient remains alert and in significant distress. Discussed the balance between adequate analgesia and sedation/lethargy/respiratory compromise, patient and family are accepting of these risks in the name of optimal symptom relief. Emotional support provided, will continue to check in through out the weekend for symptom relief and support. [facility] Geriatrics and Palliative Care  Taras Rivas, Palliative Care Attending  Contact Info: message on [employer] Teams (Taras Rivas)    HPI:  pt. is a 66 y/o male with PMH of Lung cancer as per pt. had surgery of his rt. lung about 1 year ago, no chemo, no radiation, Mandible cancer(  as per pt. had biopsy on  at [facility] )  hep c, Former IVDA , emphysema who presents to the hospital with lower abd pain area pain x 1 day , somewhat constant, moderate to severe at times, which according to pt. started after he felt dizzy and fell on the ground yesterday ( was in his yard ) pt. not giving good history for how long he was on the floor. denies syncopy. no HA, no diarrhea. no melena, no hemoptysis, no hematemesis, no n/v. pt. reports some cough and phlegm, no fever, no sick contact. In the ER pt. is in ARF/Hyperkalemic with Cpk of 10,100 and trop of 0.17. pt. reports no cp, some sob with cough. pt. does not appear to be a good historian.  (2021 20:16)    Interval events and developments noted. Discussed with SICU team. Patient reaffirms symptom-directed care/comfort measures approach. Discussed with son and ex-wife at bedside. SICU completed MOLST w/ DNR/DNI and placed in chart. Flowsheet/PRN use reviewed; has received ~5.5mg IV Dilaudid and started on Morphine gtt (currently at 20mg/hr) in past 12hrs. Patient remains alert and in significant distress. Discussed the balance between adequate analgesia and sedation/lethargy/respiratory compromise, patient and family are accepting of these risks in the name of optimal symptom relief. Emotional support provided, will continue to check in through out the weekend for symptom relief and support.    PERTINENT PM/SXH:   Jaw cancer  Lung cancer  H/O emphysema  History of lung surgery    FAMILY HISTORY:  No pertinent family history in first degree relatives    ITEMS NOT CHECKED ARE NOT PRESENT    SOCIAL HISTORY:   Significant other/partner:  [] , ex-wife is involved  Children:  [x]  Mosque/Spirituality:  Substance hx:  [x]   Tobacco hx:  [x]   Alcohol hx: []   Home Opioid hx:  [x] I-Stop Reference No: [id number]  2021	oxycodone hcl 5 mg/5 ml soln	140ml	7  2021	oxycodone hcl 5 mg/5 ml soln	140ml	7  2020	alprazolam 0.5 mg tablet	2	2	Mccormick,   Living Situation: [x]Home  []Long term care  []Rehab []Other    ADVANCE DIRECTIVES:    DNR Yes  [x]MOLST  []Living Will  DECISION MAKER(s):  [] Health Care Proxy(s)  [] Surrogate(s)  [] Guardian           Name(s):              Phone Number(s):    BASELINE (I)ADL(s) (prior to admission):  [place]: [x]Total  [] Moderate []Dependent    ALLERGIES:  No Known Allergies    MEDICATIONS  (STANDING):  albuterol/ipratropium for Nebulization 3 milliLiter(s) Nebulizer every 6 hours  budesonide  80 MICROgram(s)/formoterol 4.5 MICROgram(s) Inhaler 2 Puff(s) Inhalation two times a day  HYDROmorphone  Injectable 6 milliGRAM(s) IV Push once  HYDROmorphone Infusion 4 mG/Hr (4 mL/Hr) IV Continuous <Continuous>  multiple electrolytes Injection Type 1 1000 milliLiter(s) (75 mL/Hr) IV Continuous <Continuous>    MEDICATIONS  (PRN):  HYDROmorphone  Injectable 1 milliGRAM(s) IV Push every 3 hours PRN Moderate Pain (4 - 6)  HYDROmorphone  Injectable 2 milliGRAM(s) IV Push every 3 hours PRN Severe Pain (7 - 10)  LORazepam   Injectable 2 milliGRAM(s) IV Push every 2 hours PRN Pain, dyspnea  ondansetron Injectable 4 milliGRAM(s) IV Push every 6 hours PRN Nausea and/or Vomiting    PRESENT SYMPTOMS: []Unable to obtain due to poor mentation/encephalopathy  Source if other than patient:  []Family   []Team     Pain: [x] yes [ ] no  QOL impact - debilitating  Location - abdomen                   Aggravating Factors - movement, palpation  Quality - sharping, stabbing  Radiation - diffuse  Timing - constant  Severity (0-10 scale) - 8  Minimal Acceptable Level (0-10 scale) -    PAIN AD Score:  [email or web address]/[email or web address] (press ctrl +  left click to view)    Dyspnea:                           []Mild  []Moderate []Severe  Anxiety:                             [x]Mild []Moderate []Severe  Fatigue:                             []Mild []Moderate []Severe  Nausea:                             []Mild []Moderate []Severe  Loss of Appetite:              []Mild []Moderate []Severe  Constipation:                    []Mild []Moderate []Severe    Other Symptoms:  []All other review of systems negative     Palliative Performance Status Version 2:  30%    [email or web address]://[email or web address]/news/[email or web address]_performance_[email or web address]_[email or web address]    PHYSICAL EXAM:  GENERAL:  [x]Alert  []Oriented x   []Lethargic  []Cachexia  []Unarousable  [x]Verbal  []Non-Verbal  Behavioral:   [x] Anxiety  [] Delirium [] Agitation [] Other  HEENT:  [x]Normal   []Dry mouth   []ET Tube/Trach  []Oral lesions  PULMONARY:   [x]Clear [x]Tachypnea  []Audible excessive secretions   []Rhonchi        []Right []Left []Bilateral  []Crackles        []Right []Left []Bilateral  []Wheezing     []Right []Left []Bilateral  CARDIOVASCULAR:    []Regular []Irregular [x]Tachy  []Archie []Murmur []Other  GASTROINTESTINAL:  []Soft  [x]Distended   []+BS  []Non tender [x]Tender  []PEG []OGT/ NGT  Last BM:   GENITOURINARY:  [x]Normal [] Incontinent   []Oliguria/Anuria   []Mar  MUSCULOSKELETAL:   []Normal   [x]Weakness  []Bed/Wheelchair bound []Edema  NEUROLOGIC:   [x]No focal deficits  [] Cognitive impairment  [] Dysphagia []Dysarthria [] Paresis []Other   SKIN:   [x]Normal   []Pressure ulcer(s)  []Rash    CRITICAL CARE:  [ ] Shock Present  [ ]Septic [ ]Cardiogenic [ ]Neurologic [ ]Hypovolemic  [ ]  Vasopressors [ ]  Inotropes   [ ] Respiratory failure present [ ] Mechanical Ventilation [ ] Non-invasive ventilatory support [ ] High-Flow  [ ] Acute  [ ] Chronic [ ] Hypoxic  [ ] Hypercarbic [ ] Other  [ ] Other organ failure    Vital Signs Last 24 Hrs  T(C): 37.1 (10 Apr 2021 12:00), Max: 37.1 (10 Apr 2021 12:00)  T(F): 98.8 (10 Apr 2021 12:00), Max: 98.8 (10 Apr 2021 12:00)  HR: 96 (10 Apr 2021 12:00) (89 - 103)  BP: 98/61 (10 Apr 2021 12:00) (70/29 - 172/74)  BP(mean): 73 (10 Apr 2021 12:00) (44 - 108)  RR: 19 (10 Apr 2021 12:00) (14 - 26)  SpO2: 93% (10 Apr 2021 12:00) (83% - 98%) I&O's Summary    10 Apr 2021 07:01  -  10 Apr 2021 12:57  --------------------------------------------------------  IN: 698 mL / OUT: 425 mL / NET: 273 mL      LABS:                        12.0   32.33 )-----------( 165      ( 10 Apr 2021 08:58 )             37.5   04-10    142  |  103  |  63.0<H>  ----------------------------<  87  5.1   |  21.0<L>  |  2.65<H>    Ca    7.7<L>      10 Apr 2021 08:58  Phos  5.4     04-10  Mg     2.3     04-10    TPro  5.9<L>  /  Alb  3.1<L>  /  TBili  1.6  /  DBili  x   /  AST  317<H>  /  ALT  131<H>  /  AlkPhos  176<H>  04-10  PT/INR - ( 10 Apr 2021 08:58 )   PT: 16.4 sec;   INR: 1.44 ratio         PTT - ( 10 Apr 2021 08:58 )  PTT:30.5 sec  Urinalysis Basic - ( 10 Apr 2021 03:02 )    Color: Yellow / Appearance: Clear / S.015 / pH: x  Gluc: x / Ketone: Negative  / Bili: Negative / Urobili: Negative mg/dL   Blood: x / Protein: 30 mg/dL / Nitrite: Negative   Leuk Esterase: Negative / RBC: 3-5 /HPF / WBC 0-2   Sq Epi: x / Non Sq Epi: Occasional / Bacteria: Occasional    RADIOLOGY & ADDITIONAL STUDIES:  < from: CT Abdomen and Pelvis No Cont (21 @ 16:55) >  LOWER CHEST: Mild patchy groundglass densities in both lungs for which clinical correlation with pneumonia is recommended. Atypical viral pneumonia such as Covid cannot be excluded. A few small lung nodules in the right lower lobe measuring up to 6 mm (image 40 series 3); follow-up chest CT in 3 months may be pursued to ensure stability. Blebs in the lower lung zones bilaterally.    LIVER: Within normal limits.  BILE DUCTS: Not well visualized due to lack of IV contrast.  GALLBLADDER: Small layering gallstones in the gallbladder. No evidence for thickened gallbladder wall or pericholecystic fluid.  SPLEEN: Within normal limits.  PANCREAS: Atrophic  ADRENALS: Nonspecific mild adrenal thickening bilaterally.  KIDNEYS/URETERS: Within normal limits.    BLADDER: Within normal limits.  REPRODUCTIVE ORGANS: The prostate and seminal vesicles appear grossly unremarkable    BOWEL: Colonic diverticulosis without evidence for diverticulitis. The appendix appears unremarkable. Moderate to large amount of stool in the ascending colon. No bowel obstruction, or grosslythickened bowel wall. Hazy densities adjacent to the proximal ascending colon and the right lower mesentery may represent nonspecific enterocolitis.  PERITONEUM: No ascites. No free air.  VESSELS: Mild infrarenal abdominal aortic aneurysm measuring 3.1 cm in diameter. Right common iliac artery aneurysm measuring 1.9 cm in diameter. Left common iliac artery aneurysm measuring 1.6 cm in diameter.  RETROPERITONEUM/LYMPH NODES: No lymphadenopathy.  ABDOMINAL WALL: Small fat-containing periumbilical hernia.  BONES: Degenerative spondylosis. Bilateral L5 spondylolysis. Grade 2 anterolisthesis at L5-S1.    IMPRESSION:  Colonic diverticulosis without evidence for diverticulitis. The appendix appears unremarkable. Moderate to large amount of stool in the ascending colon. No bowel obstruction, or grossly thickened bowel wall. Hazy densities adjacent to the proximal ascending colon and the right lower mesentery may represent nonspecific enterocolitis.  Cholelithiasis.  Mild patchy groundglass densities in both lungs for which clinical correlation with pneumonia is recommended. Atypical viral pneumonia such as Covid cannot be excluded.  A few small lung nodules in the right lower lobe measuring up to 6 mm; follow-up chest CT in 3 months maybe pursued to ensure stability.    PROTEIN CALORIE MALNUTRITION PRESENT: [ ]mild [ ]moderate [ ]severe [ ]underweight [ ]morbid obesity  []PPSV2 < or = to 30% []significant weight loss  []poor nutritional intake []catabolic state []anasarca     Albumin, Serum: 3.1 g/dL (04-10-21 @ 08:58)  Artificial Nutrition []     REFERRALS:   []Chaplaincy  []Hospice  []Child Life  [x]Social Work  []Case management []Holistic Therapy     Goals of Care Document: AMY Bliss (04-10-21 @ 11:19)  Goals of Care Conversation:   Participants:  · Participants  Patient; Family  · Spouse  Myah Echols (Ex-wife)  · Child(jesus)  Primo Echols (son)    Advance Directives:  · Caregiver:  no    Conversation Discussion:  · Conversation  Diagnosis; Prognosis; MOLST Discussed; Treatment Options; Palliative Care Referral  · Conversation Details  Patient with Stage IV squamous cell carcinoma, liver cirrhosis, and now with likely mesenteric ischemia.  Pt with severe abdominal pain with signs of peritonitis.  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  Pt and family were initially offered a surgical procedure but the family and patient were never agreeable to surgery.  After the surgical team reviewed images, labs, and reassessed patient status and history, they also concluded that he was not a candidate for OR intervention.  MOLST form was completed by patient with his family present.  Palliative care was consulted.  Pt is DNR/DNI with full comfort measures.    What Matters Most To Patient and Family:  · What matters most to patient and family  Pain control / comfort    Personal Advance Directives Treatment Guidelines:   Treatment Guidelines:  · Decision Maker  Patient  · Treatment Guidelines  DNR Order; Comfort measures only; No blood draws; No artificial nutrition; No antibiotics; IV fluid trial; DNI  · Treatment Guideline Comments  Full comfort measures    MOLST:  · Completed  10-Apr-2021  · Updated  10-Apr-2021    Location of Discussion:   Duration of Advanced Care Planning Meeting:  · Time spent (in minutes)  60    Location of Discussion:  · Location of discussion  Face to face      Electronic Signatures:  Jose Mansfield)  (Signature Pending)  	Co-Signer: Goals of Care Conversation, Personal Advance Directives Treatment Guidelines, Location of Discussion  Agustin Bliss (PA)  (Signed 10-Apr-2021 11:38)  	Authored: Goals of Care Conversation, Personal Advance Directives Treatment Guidelines, Location of Discussion      Last Updated: 10-Apr-2021 11:38 by Agustin Bliss (PA)

## 2021-04-10 NOTE — CONSULT NOTE ADULT - PROBLEM SELECTOR RECOMMENDATION 4
.  Patient is DNR/DNI, MOLST in the chart  -patient reaffirms symptom-directed approach/comfort measures, family at bedside in agreement  -can make an inpatient hospice referral pending clinical course

## 2021-04-10 NOTE — CHART NOTE - NSCHARTNOTEFT_GEN_A_CORE
SICU TRANSFER NOTE  -----------------------------  ICU Admission Date: 4-10-21  Transfer Date: 04-10-21 @ 15:20    Admission Diagnosis:  Stage IV squamous cell carcinoma, Hep C liver cirrhosis, Mesenteric ischemia    Active Problems/injuries: Stage IV squamous cell carcinoma, Hep C liver cirrhosis, Mesenteric ischemia    Procedures: None    Consultants:    [ x] Palliative       [x ] Advanced Directives:  DNR / DNI.  Full comfort measures      Medications  artificial tears (preservative free) Ophthalmic Solution 1 Drop(s) Both EYES three times a day  HYDROmorphone  Injectable 2 milliGRAM(s) IV Push every 3 hours PRN  HYDROmorphone Infusion 4 mG/Hr IV Continuous <Continuous>  LORazepam   Injectable 2 milliGRAM(s) IV Push every 2 hours PRN  multiple electrolytes Injection Type 1 1000 milliLiter(s) IV Continuous <Continuous>  ondansetron Injectable 4 milliGRAM(s) IV Push every 6 hours PRN  petrolatum Ophthalmic Ointment 1 Application(s) Both EYES two times a day      [ x] I attest I have reviewed and reconciled all medications prior to transfer    IV Fluids  sodium chloride 0.9%: 1000 milliLiter(s)      with sodium bicarbonate additive 100 milliEquivalent(s), infuse at 150 mL/Hr  Provider's Contact #: [phone]  sodium chloride 0.9% Bolus:   500 milliLiter(s), IV Bolus, once, infuse over 60 Minute(s), Stop After 1 Doses  Provider's Contact #: [phone]  sodium chloride 0.9%: 1000 milliLiter(s)      with sodium bicarbonate additive 100 milliEquivalent(s), infuse at 125 mL/Hr  Provider's Contact #: [phone]  sodium chloride 0.9%: 1000 milliLiter(s)      with sodium bicarbonate additive 100 milliEquivalent(s), infuse at 150 mL/Hr  Provider's Contact #: [phone]  sodium chloride 0.9% Bolus:   1000 milliLiter(s), IV Bolus, once, infuse over 60 Minute(s), Stop After 1 Doses  Provider's Contact #: [phone]  sodium chloride 0.9% Bolus:   1000 milliLiter(s), IV Bolus, Once, infuse over 1 Hour(s), Stop After 1 Doses  Provider's Contact #: [phone]  sodium chloride 0.9% Bolus:   1000 milliLiter(s), IV Bolus, once, infuse over 1 Hour(s), Stop After 1 Doses        I have discussed this case with Dr. Kumari upon transfer and all questions regarding ICU course were answered.  The following items are to be followed up:    - Pt is DNR/DNI with full comfort measures in the setting is mesenteric ischemia with stage IV cancer and liver cirrhosis  - Palliative care consultant on board  - Mike willingham with breakthrough medications  - MOLST form in chart  - Family and patient and surgical team agree that pt is not a candidate for surgery at this time  - F/up palliative care recs

## 2021-04-10 NOTE — CHART NOTE - NSCHARTNOTEFT_GEN_A_CORE
Called at 17:10 to see examined patient RN reports family at bedside of patient on comfort care, RN states she believes the patient just .  On physical exam, patient did not respond to verbal or noxious stimuli.  No spontaneous respirations.  Absent heart and breath sounds.  Absent radial and carotid pulses.   Pupils are fixed and dilated, no corneal reflex.   Patient pronounced dead at 19:17  Attending notified.  Family did not want to have an  autopsy.

## 2021-04-10 NOTE — CONSULT NOTE ADULT - SUBJECTIVE AND OBJECTIVE BOX
Acute Care Surgery Consult Note    HPI:   66yo Male w/ PMH Stage IV SCC of jaw, lung cancer, cirrhosis, hep c, and IVDU admitted to medicine service on 4/9m after presenting with lower abdominal pain. patient reportedly was down following either syncopal episode or fall and awoke with severe abdominal pain prompting him to come to ED. Patient also says he had associated bloody bowel movements. Patient found to have profound leukocytosis, electrolyte abnormalities. He was admitted to hospitalist service. Surgery called at 4am due to ongoing abdominal pain and findings on CT performed the previous evening.     PMH:  Jaw cancer  Lung cancer  H/O emphysema  cirrhohsis  hep c  AAA  PAD  CAD  COPD  etoh abuse      PSH:  History of lung surgery  jaw biopsy      Home Medications:  Albuterol Sulfate  (90 Base) MCG/ACT Inhalation Aerosol Solution; INHALE 1-2  PUFFS EVERY 4-6 HOURS AS NEEDED AND AS DIRECTED  Atrovent HFA 17 MCG/ACT Inhalation Aerosol Solution; INHALE 1 PUFFS Every 6 hours  Cefuroxime Axetil 500 MG Oral Tablet; TAKE 1 TABLET EVERY 12 HOURS DAILY  Dexamethasone 6 MG Oral Tablet; TAKE 1 TABLET DAILY  First-Mouthwash BLM Mouth/Throat Suspension; USE AS DIRECTED - 5ml 4x/day as  needed  Symbicort 80-4.5 MCG/ACT Inhalation Aerosol; INHALE 1 PUFFS Daily      ALL:  nkda    FMH:  Denies family history of gastrointestinal malignancy     SOC Hx:   hx of etoh abuse, extensive history of smoking    Physical Exam:   Gen: cachectic male, acute pain  Neuro: AOx3,   RESP: nonlabored breathing  CVS: RRR,   GI: scaphoid male, abd soft, diffusely ttp, ND, involuntary guarding  Extremities: warm, well perfused    Labs:  CAPILLARY BLOOD GLUCOSE      POCT Blood Glucose.: 158 mg/dL (09 Apr 2021 18:43)    CBC Full  -  ( 09 Apr 2021 15:27 )  WBC Count : 39.28 K/uL  RBC Count : 3.83 M/uL  Hemoglobin : 12.3 g/dL  Hematocrit : 39.8 %  Platelet Count - Automated : 248 K/uL  Mean Cell Volume : 103.9 fl  Mean Cell Hemoglobin : 32.1 pg  Mean Cell Hemoglobin Concentration : 30.9 gm/dL  Auto Neutrophil # : 37.91 K/uL  Auto Lymphocyte # : 1.02 K/uL  Auto Monocyte # : 0.35 K/uL  Auto Eosinophil # : 0.00 K/uL  Auto Basophil # : 0.00 K/uL  Auto Neutrophil % : 96.5 %  Auto Lymphocyte % : 2.6 %  Auto Monocyte % : 0.9 %  Auto Eosinophil % : 0.0 %  Auto Basophil % : 0.0 %    04-10    140  |  102  |  59.0<H>  ----------------------------<  125<H>  4.5   |  22.0  |  2.61<H>    Ca    7.8<L>      10 Apr 2021 01:14    TPro  7.6  /  Alb  3.9  /  TBili  1.2  /  DBili  x   /  AST  337<H>  /  ALT  130<H>  /  AlkPhos  278<H>  04-09    LIVER FUNCTIONS - ( 09 Apr 2021 15:27 )  Alb: 3.9 g/dL / Pro: 7.6 g/dL / ALK PHOS: 278 U/L / ALT: 130 U/L / AST: 337 U/L / GGT: x           PT/INR - ( 09 Apr 2021 15:27 )   PT: 14.2 sec;   INR: 1.24 ratio         PTT - ( 09 Apr 2021 15:27 )  PTT:33.7 sec    < from: CT Abdomen and Pelvis No Cont (04.09.21 @ 16:55) >   EXAM:  CT ABDOMEN AND PELVIS                          PROCEDURE DATE:  04/09/2021          INTERPRETATION:  CLINICAL INFORMATION: Right lower quadrant abdominal pain    COMPARISON: None.    CONTRAST/COMPLICATIONS:  IV Contrast: NONE  Oral Contrast: NONE  Complications: None reported at time of study completion    PROCEDURE:  CT of the Abdomen and Pelvis was performed.  Sagittal and coronal reformats were performed.    FINDINGS:  LOWER CHEST: Mild patchy groundglass densities in both lungs for which clinical correlation with pneumonia is recommended. Atypical viral pneumonia such as Covid cannot be excluded. A few small lung nodules in the right lower lobe measuring up to 6 mm (image 40 series 3); follow-up chest CT in 3 months may be pursued to ensure stability. Blebs in the lower lung zones bilaterally.    LIVER: Within normal limits.  BILE DUCTS: Not well visualized due to lack of IV contrast.  GALLBLADDER: Small layering gallstones in the gallbladder. No evidence for thickened gallbladder wall or pericholecystic fluid.  SPLEEN: Within normal limits.  PANCREAS: Atrophic  ADRENALS: Nonspecific mild adrenal thickening bilaterally.  KIDNEYS/URETERS: Within normal limits.    BLADDER: Within normal limits.  REPRODUCTIVE ORGANS: The prostate and seminal vesicles appear grossly unremarkable    BOWEL: Colonic diverticulosis without evidence for diverticulitis. The appendix appears unremarkable. Moderate to large amount of stool in the ascending colon. No bowel obstruction, or grosslythickened bowel wall. Hazy densities adjacent to the proximal ascending colon and the right lower mesentery may represent nonspecific enterocolitis.  PERITONEUM: No ascites. No free air.  VESSELS: Mild infrarenal abdominal aortic aneurysm measuring 3.1 cm in diameter. Right common iliac artery aneurysm measuring 1.9 cm in diameter. Left common iliac artery aneurysm measuring 1.6 cm in diameter.  RETROPERITONEUM/LYMPH NODES: No lymphadenopathy.  ABDOMINAL WALL: Small fat-containing periumbilical hernia.  BONES: Degenerative spondylosis. Bilateral L5 spondylolysis. Grade 2 anterolisthesis at L5-S1.    IMPRESSION:  Colonic diverticulosis without evidence for diverticulitis. The appendix appears unremarkable. Moderate to large amount of stool in the ascending colon. No bowel obstruction, or grossly thickened bowel wall. Hazy densities adjacent to the proximal ascending colon and the right lower mesentery may represent nonspecific enterocolitis.    Cholelithiasis.    Mild patchy groundglass densities in both lungs for which clinical correlation with pneumonia is recommended. Atypical viral pneumonia such as Covid cannot be excluded.    A few small lung nodules in the right lower lobe measuring up to 6 mm; follow-up chest CT in 3 months maybe pursued to ensure stability.    Other findings as above.        < end of copied text >

## 2021-04-10 NOTE — CONSULT NOTE ADULT - ASSESSMENT
·	agree with switching to Dilaudid infusion, recommend 3mg/hr  ·	recommend increasing PRN to Dilaudid 6mg q2h PRN for Moderate/Severe Pain  ·	agree Ativan 2mg IV q2h PRN for Anxiety/Agitation    Patient and family are fully amenable to symptom-directed care. They understand the patient has a fatal process underway and the primary goal is maximizing comfort. FULL NOTE TO FOLLOW    ·	agree with switching to Dilaudid infusion, recommend 3mg/hr  ·	recommend increasing PRN to Dilaudid 6mg q2h PRN for Moderate/Severe Pain  ·	agree Ativan 2mg IV q2h PRN for Anxiety/Agitation    Patient and family are fully amenable to symptom-directed care. They understand the patient has a fatal process underway and the primary goal is maximizing comfort. FULL NOTE TO FOLLOW    ·	agree with switching to Dilaudid infusion, recommend 3mg/hr  ·	recommend increasing PRN to Dilaudid 6mg q2h PRN for Moderate/Severe Pain  ·	agree Ativan 2mg IV q2h PRN for Anxiety/Agitation    If pain is refractory to the above, can consider Ketamine as a non-opiate pain adjunct --> Burst Ketamine (100mg IV over 24hrs x3 days) vs usual ICU infusion protocol  Patient and family are fully amenable to symptom-directed care. They understand the patient has a fatal process underway and the primary goal is maximizing comfort. 64yo M with PMH of Lung CA, Mandibular CA, Hep C, Former IVDA, and Emphysema p/w abdominal pain with intestinal ischemia. Palliative consulted for complex symptom management and medical decision making in likely life-limiting illness.    ·	agree with switching to Dilaudid infusion, recommend 3mg/hr  ·	recommend increasing PRN to Dilaudid 6mg q2h PRN for Moderate/Severe Pain  ·	agree Ativan 2mg IV q2h PRN for Anxiety/Agitation    If pain is refractory to the above, can consider Ketamine as a non-opiate pain adjunct --> Burst Ketamine (100mg IV over 24hrs x3 days) vs usual ICU infusion protocol  Patient and family are fully amenable to symptom-directed care. They understand the patient has a fatal process underway and the primary goal is maximizing comfort.

## 2021-04-10 NOTE — PROGRESS NOTE ADULT - ASSESSMENT
pt. is a 66 y/o male with PMH of Lung cancer as per pt. had surgery of his rt. lung about 1 year ago, no chemo, no radiation, Mandible cancer(  as per pt. had biopsy on March, 10th 2021 at [facility] )  hep c, Former IVDA , emphysema who presents to the hospital with lower abd pain area pain x 1 day , somewhat constant, moderate to severe at times, which according to pt. started after he felt dizzy and fell on the ground yesterday ( was in his yard ) pt. not giving good history for how long he was on the floor. denies syncopy. no HA, no diarrhea. no melena, no hemoptysis, no hematemesis, no n/v. pt. reports some cough and phlegm, no fever, no sick contact. In the ER pt. is in ARF/Hyperkalemic with Cpk of 10,100 and trop of 0.17. pt. reports no cp, some sob with cough. pt. does not appear to be a good historian.     Problem/Plan -1   Problem: Mesenteric ischemia .  From Celiac trunk and SMA stenosis.   Surgery no intervention giving multiple comorbid conditions and poor prognosis.    Surgery, GI , Hospitalist present while surgery discussed with adult son and Ex wife.  Family opted for hospice , comfort care.    Palliative care to follow/Hospice consult     Problem/Plan - 2:  ·  Problem: ALMA (acute kidney injury).  Plan: ALMA, Very likely pre renal, dehydration , Rhabdo contributing . acidosis, which is improving with iv fluids, will continue iv fluid.      Problem/Plan - 2:  ·  Problem: Hyperkalemia.  Plan: Pt. treated with dextrose, insulin, bicarb, lokelma, calcium chloride. follow repeat labs.    Continue gentle IV fluid.       Problem/Plan - 3:  ·  Problem: Traumatic rhabdomyolysis, initial encounter.  Plan: Pt. not giving good history for how long he was on the floor, Rhabdomyolysis likely related to his fall and being on the floor. iv fluids with bicarb. ct head , neck, maxillo facial no acute findings.      Problem/Plan - 4:  ·  Problem: Enterocolitis.  Plan: lower abd. pain , likely related to enterocolitis, will keep on zosyn, clear liquid diet,      Problem/Plan - 5:  ·  Problem: Leukocytosis, unspecified type.  Plan: enterocolitis related ? pneumonia ? viral / covid pneumonia ? follow ct  chest, follow cultures, will keep on zosyn, one dose of vanco in the ER.  covid -19 Negative .      Problem/Plan - 6:  Problem: Elevated troponin. Plan: pt. reported dizziness, fall, no syncopy, ct head , neck, maxillo facial no acute findings, will be on tele, trend trop, first 0.17 likely renally related, pt. with no cp. follow echo, ekg nsr 90 no acute changes. cardiology team plan no intervention .     Problem/Plan - 7:  ·  Problem: History of lung cancer.  Plan: follow ct chest. as per pt. had lung surgery 1 year ago, no other treatment.      Problem/Plan - 8:  ·  Problem: Transaminitis.  Plan: Secondary to prexisiting liver disease.     Disp: Comfort care.  Palliative . Hospice .

## 2021-04-10 NOTE — CHART NOTE - NSCHARTNOTEFT_GEN_A_CORE
Surgery consult called at 03:30, 4/10/21. Surgery consult called at 03:30, 4/10/21. Surgery recommended urgent GI consult. GI consult called, by Dr Mckeon who case discussed with GI.

## 2021-04-10 NOTE — DISCHARGE NOTE FOR THE EXPIRED PATIENT - OTHER SIGNIFICANT FINDINGS
MEDICINE NOTE    Called to bedside to evaluate the patient for .     On physical exam, patient did not respond to verbal or noxious stimuli.  No spontaneous respirations.  Absent heart and breath sounds.  Absent radial and carotid pulses.   Pupils are fixed and dilated, no corneal reflex.     Patient pronounced dead at _1917.  Attending notified.  Family did not want an  autopsy.

## 2021-04-10 NOTE — CONSULT NOTE ADULT - PROBLEM SELECTOR RECOMMENDATION 9
.  -agree with switching to Dilaudid infusion, recommend 3mg/hr  -recommend increasing PRN to Dilaudid 6mg q2h PRN for Moderate/Severe Pain  -can consider Ketamine as non-opiate adjunct for refractory pain    Opiate infusion will take several hours to reach steady-state, IV PRN boluses will be needed to address acute symptoms. Patient is not opiate-naive and painful, acute process which necessitates high dose opiates for adequate symptom relief

## 2021-04-10 NOTE — CONSULT NOTE ADULT - CONSULT REASON
Elevated Cpk
Lower abdominal pain / Ischemic colitis
r/o mesenteric ischemia
Pain/Symptom Management and Complex Medical Decision Making/GOC in the setting of Acute Abdomen and Metastatic CA

## 2021-04-10 NOTE — CONSULT NOTE ADULT - PROBLEM SELECTOR RECOMMENDATION 5
.  Complex medical decision making / symptom management in the setting of acute abdomen and pain crisis.    Will continue to follow for ongoing GOC discussion / titration of palliative regimen.   Emotional support provided, questions answered.    Taras Rivas, Palliative Care Attending  Contact Info: Message on [employer] Teams (Taras Rivas)

## 2021-04-10 NOTE — CONSULT NOTE ADULT - PROBLEM SELECTOR RECOMMENDATION 3
.  In the setting of celiac and SMA stenosis  -not a candidate for colonoscopy or surgical +/or vascular interventions  -likely fatal process underway

## 2021-04-12 ENCOUNTER — TRANSCRIPTION ENCOUNTER (OUTPATIENT)
Age: 66
End: 2021-04-12

## 2021-04-12 ENCOUNTER — NON-APPOINTMENT (OUTPATIENT)
Age: 66
End: 2021-04-12

## 2021-04-12 RX ORDER — ALBUTEROL 90 UG/1
0 AEROSOL, METERED ORAL
Qty: 0 | Refills: 0 | DISCHARGE

## 2021-04-13 ENCOUNTER — TRANSCRIPTION ENCOUNTER (OUTPATIENT)
Age: 66
End: 2021-04-13

## 2021-04-14 LAB
CULTURE RESULTS: SIGNIFICANT CHANGE UP
CULTURE RESULTS: SIGNIFICANT CHANGE UP
SPECIMEN SOURCE: SIGNIFICANT CHANGE UP
SPECIMEN SOURCE: SIGNIFICANT CHANGE UP

## 2021-04-22 LAB — HCV RNA FLD QL NAA+PROBE: SIGNIFICANT CHANGE UP

## 2021-05-03 ENCOUNTER — APPOINTMENT (OUTPATIENT)
Dept: HEMATOLOGY ONCOLOGY | Facility: CLINIC | Age: 66
End: 2021-05-03

## 2021-05-10 ENCOUNTER — APPOINTMENT (OUTPATIENT)
Dept: OTOLARYNGOLOGY | Facility: CLINIC | Age: 66
End: 2021-05-10

## 2021-06-03 NOTE — PATIENT PROFILE ADULT - PACKS YRS CALCULATION
Speech Language Pathology  Dysphagia Treatment Note/Speech Language Evaluation     Name:  Neelam Porras  :   1971  Medical Diagnosis:  Hypoglycemia [E16.2]  Treatment Diagnosis: Oropharyngeal Dysphagia  Pain level: Pt did not report pain    Current Diet Level: NPO except meds crushed in puree     Tolerance of Current Diet Level: N/a     Assessment of Texture Tolerance:  Pt was positioned upright in bed on RA. Video interpretor was utilized to translate during sessions. Pt was noted to have delayed and inconsistent responses to questions/commands. Pt would not follow directions to fully complete oral mechanism examination. Many missing teeth were noted. With ice chip pt demonstrate absent bolus manipulation despite cues to chew and manipulate bolus. Pharyngeal pooling was suspected with eventual swallow initiation with decreased laryngeal elevation. With hand over hand assistance pt was able to self feed liquids via cup (thin and Mildly Thick (Nectar) Liquids ). He required full feeding for bite of puree. Pt demonstrate prolonged oral holding with limited delayed bolus manipulation, suspected pharyngeal pooling, delayed swallow initiation and s/s of delayed pharyngeal clearing (use of multiple delayed swallows). Intermittent immediate and delayed throat clearing was noted throughout evaluation, this could be indicative of penetration/aspiration vs delayed pharyngeal clearing. Vocal quality largely remained clear and no change in respiratory status was noted. Pt demonstrates increased risk for dysphagia/aspiration due to mental status, inability to self feed, suspected weakness and suspected reduced airway protection. At this time recommend initiation of Dysphagia I Pureed with thin liquids with full assistance with feeding and close monitoring of respiratory status.      Diet and Treatment Recommendations:   1.) Dysphagia I Pureed with thin liquids, meds crushed as able with puree, No straws-only and seemingly decreased intelligibility. Cognitive deficits were characterized by reduced orientation (pt unable to independently state location however with f/2 he was able to provide correct answer, he was unable to carryover information when asked again by MD), impaired short term recall and seemingly reduced insight. Plan: Speech therapy 3-5 times a weeks for speech-language treatment, and dysphagia treatment     Discharge Recommendations:  Pt will benefit from continued skilled Speech Therapy for Speech and Dysphagia services, prior to returning home. Prior Status: Resides in apartment     Speech Language Short-term goals: 1. Pt will improve verbal expression via graded tasks to 80%   2. Pt will improve auditory processing/comprehension of commands and questions to 80%, via graded tasks   3. Pt will improve orientation and short term recall via graded tasks to 80%     Plan:  Continued daily Dysphagia treatment with goals per plan of care. Patient/Family Education:Education given to the Pt (suspect he will benefit from further repetitions to improve recall/comprehension) and nurse, who verbalized understanding    Discharge Recommendations:  Pt will benefit from continued skilled Speech Therapy for Dysphagia services, prior to returning home. Timed Code Treatment: 0 minutes     Total Treatment Time: 38 minutes     If patient discharges prior to next session this note will serve as a discharge summary.      Roscoe Carrillo, [address]  Speech Language Pathologist 40

## 2021-06-22 NOTE — ED PROVIDER NOTE - DR. NAME
CHLORHEXIDINE CLOTH INSTRUCTIONS  The morning of surgery follow these instructions using the Chlorhexidine cloths you've been given.  These steps reduce bacteria on the body.  Do not use the cloths near your eyes, ears mouth, genitalia or on open wounds.  Throw the cloths away after use but do not try to flush them down a toilet.      • Open and remove one cloth at a time from the package.    • Leave the cloth unfolded and begin the bathing.  • Massage the skin with the cloths using gentle pressure to remove bacteria.  Do not scrub harshly.   • Follow the steps below with one 2% CHG cloth per area (6 total cloths).  • One cloth for neck, shoulders and chest.  • One cloth for both arms, hands, fingers and underarms (do underarms last).  • One cloth for the abdomen followed by groin.  • One cloth for right leg and foot including between the toes.  • One cloth for left leg and foot including between the toes.  • The last cloth is to be used for the back of the neck, back and buttocks.    Allow the CHG to air dry 3 minutes on the skin which will give it time to work and decrease the chance of irritation.  The skin may feel sticky until it is dry.  Do not rinse with water or any other liquid or you will lose the beneficial effects of the CHG.  If mild skin irritation occurs, do rinse the skin to remove the CHG.  Report this to the nurse at time of admission.  Do not apply lotions, creams, ointments, deodorants or perfumes after using the clothes. Dress in clean clothes before coming to the hospital.      Take the following medications the morning of surgery: DIGOXIN, NEXIUM AND METOPROLOL    ARRIVAL TIME 06:00      If you are on prescription narcotic pain medication to control your pain you may also take that medication the morning of surgery.     General Instructions:  • Do not eat solid food after midnight the night before surgery.  • You may drink clear liquids day of surgery but must stop at least one hour before  your hospital arrival time.  • It is beneficial for you to have a clear drink that contains carbohydrates the day of surgery.  We suggest a 12 to 20 ounce bottle of Gatorade or Powerade for non-diabetic patients or a 12 to 20 ounce bottle of G2 or Powerade Zero for diabetic patients. (Pediatric patients, are not advised to drink a 12 to 20 ounce carbohydrate drink)    Clear liquids are liquids you can see through.  Nothing red in color.     Plain water                               Sports drinks  Sodas                                   Gelatin (Jell-O)  Fruit juices without pulp such as white grape juice and apple juice  Popsicles that contain no fruit or yogurt  Tea or coffee (no cream or milk added)  Gatorade / Powerade  G2 / Powerade Zero    • Patients who avoid smoking, chewing tobacco and alcohol for 4 weeks prior to surgery have a reduced risk of post-operative complications.  Quit smoking as many days before surgery as you can.  • Do not smoke, use chewing tobacco or drink alcohol the day of surgery.   • If applicable bring your C-PAP/ BI-PAP machine.  • Bring any papers given to you in the doctor’s office.  • Wear clean comfortable clothes.  • Do not wear contact lenses, false eyelashes or make-up.  Bring a case for your glasses.   • Bring crutches or walker if applicable.  • Remove all piercings.  Leave jewelry and any other valuables at home.  • Hair extensions with metal clips must be removed prior to surgery.  • The Pre-Admission Testing nurse will instruct you to bring medications if unable to obtain an accurate list in Pre-Admission Testing.        Preventing a Surgical Site Infection:  • For 2 to 3 days before surgery, avoid shaving with a razor because the razor can irritate skin and make it easier to develop an infection.    • Any areas of open skin can increase the risk of a post-operative wound infection by allowing bacteria to enter and travel throughout the body.  Notify your surgeon if you have  any skin wounds / rashes even if it is not near the expected surgical site.  The area will need assessed to determine if surgery should be delayed until it is healed.  • The night prior to surgery shower using a fresh bar of anti-bacterial soap (such as Dial) and clean washcloth.  Sleep in a clean bed with clean clothing.  Do not allow pets to sleep with you.  • Shower on the morning of surgery using a fresh bar of anti-bacterial soap (such as Dial) and clean washcloth.  Dry with a clean towel and dress in clean clothing.  • Ask your surgeon if you will be receiving antibiotics prior to surgery.  • Make sure you, your family, and all healthcare providers clean their hands with soap and water or an alcohol based hand  before caring for you or your wound.    Day of surgery:  Your arrival time is approximately two hours before your scheduled surgery time.  Upon arrival, a Pre-op nurse and Anesthesiologist will review your health history, obtain vital signs, and answer questions you may have.  The only belongings needed at this time will be a list of your home medications and if applicable your C-PAP/BI-PAP machine.  A Pre-op nurse will start an IV and you may receive medication in preparation for surgery, including something to help you relax.     Please be aware that surgery does come with discomfort.  We want to make every effort to control your discomfort so please discuss any uncontrolled symptoms with your nurse.   Your doctor will most likely have prescribed pain medications.      If you are going home after surgery you will receive individualized written care instructions before being discharged.  A responsible adult must drive you to and from the hospital on the day of your surgery and stay with you for 24 hours.  Discharge prescriptions can be filled by the hospital pharmacy during regular pharmacy hours.  If you are having surgery late in the day/evening your prescription may be e-prescribed to your  pharmacy.  Please verify your pharmacy hours or chose a 24 hour pharmacy to avoid not having access to your prescription because your pharmacy has closed for the day.    If you are staying overnight following surgery, you will be transported to your hospital room following the recovery period.  [facility] has all private rooms.    If you have any questions please call Pre-Admission Testing at [phone].  Deductibles and co-payments are collected on the day of service. Please be prepared to pay the required co-pay, deductible or deposit on the day of service as defined by your plan.    Patient Education for Self-Quarantine Process    Following your COVID testing, we strongly recommend that you do not leave your home after you have been tested for COVID except to get medical care. This includes not going to work, school or to public areas.  If this is not possible for you to do please limit your activities to only required outings.  Be sure to wear a mask when you are with other people, practice social distancing and wash your hands frequently.      The following items provide additional details to keep you safe.  • Wash your hands with soap and water frequently for at least 20 seconds.   • Avoid touching your eyes, nose and mouth with unwashed hands.  • Do not share anything - utensils, towels, food from the same bowl.   • Have your own utensils, drinking glass, dishes, towels and bedding.   • Do not have visitors.   • Do use FaceTime to stay in touch with family and friends.  • You should stay in a specific room away from others if possible.   • Stay at least 6 feet away from others in the home if you cannot have a dedicated room to yourself.   • Do not snuggle with your pet. While the CDC says there is no evidence that pets can spread COVID-19 or be infected from humans, it is probably best to avoid “petting, snuggling, being kissed or licked and sharing food (during self-quarantine)”, according  to the CDC.   • Sanitize household surfaces daily. Include all high touch areas (door handles, light switches, phones, countertops, etc.)  • Do not share a bathroom with others, if possible.   • Wear a mask around others in your home if you are unable to stay in a separate room or 6 feet apart. If  you are unable to wear a mask, have your family member wear a mask if they must be within 6 feet of you.   Call your surgeon immediately if you experience any of the following symptoms:  • Sore Throat  • Shortness of Breath or difficulty breathing  • Cough  • Chills  • Body soreness or muscle pain  • Headache  • Fever  • New loss of taste or smell  • Do not arrive for your surgery ill.  Your procedure will need to be rescheduled to another time.  You will need to call your physician before the day of surgery to avoid any unnecessary exposure to hospital staff as well as other patients.       Merry

## 2021-10-06 PROBLEM — U07.1 PNEUMONIA DUE TO COVID-19 VIRUS: Status: RESOLVED | Noted: 2020-01-01 | Resolved: 2021-01-01

## 2022-06-22 NOTE — PATIENT PROFILE ADULT - NSTOBACCO TYPE_GEN_A_CORE_RD
Cigarettes Hydroxyzine Counseling: Patient advised that the medication is sedating and not to drive a car after taking this medication.  Patient informed of potential adverse effects including but not limited to dry mouth, urinary retention, and blurry vision.  The patient verbalized understanding of the proper use and possible adverse effects of hydroxyzine.  All of the patient's questions and concerns were addressed.

## 2022-10-03 NOTE — ED ADULT NURSE NOTE - SEPSIS REFERENCE DATA CRITERIA 1
Rx Refill Note  Requested Prescriptions     Pending Prescriptions Disp Refills   • atorvastatin (LIPITOR) 40 MG tablet [Pharmacy Med Name: ATORVASTATIN 40 MG TABLET] 90 tablet 3     Sig: TAKE ONE TABLET BY MOUTH DAILY      Last office visit with prescribing clinician: 9/29/2022      Next office visit with prescribing clinician: 3/29/2023            Nathalia Pollard MA  10/03/22, 11:58 EDT  
Abormal VS: Temp > 100F or < 96.8F; SBP < 90 mmHG; HR > 120bpm; Resp > 24/min

## 2022-11-15 NOTE — ED PROVIDER NOTE - NS ED MD DISPO ISOLATION TYPES
received report and assumed care of pt at change of shift. pt awake and alert. IV antibiotic infusing to RAC. vs as charted. visitor at bedside. NAD presently Airborne/Contact

## 2023-09-20 NOTE — ED PROVIDER NOTE - DURATION
9/20/2023       RE: Arnaldo Henderson  [address]  [facility] [address]     Dear Colleague,    Thank you for referring your patient, Arnaldo Henderson, to the [facility] [place] at [facility]. Please see a copy of my visit note below.    Mr. Howell is here for follow-up of his open abdominal wound.  He denies fevers chills or sweats.  He has been packing this twice a day and has been seen in our wound care nurse for the wound.    On physical exam the wound is much smaller, is draining a mild amount of exudate, but the exit at the skin is markedly narrowed at about 3 to 4 mm.    Procedure note we opened up the wound cicatrix using 1% lidocaine for anesthesia followed by a knife to open the wound to an extent of about 1-1/2 cm in diameter.  The base the wound was clean with granulation tissue in place.  The wound was packed with 1 inch packing gauze.  Good hemostasis was noted.  Sterile dressing was applied.    Follow-up with the patient in a couple of months to recheck on his wound.  He will call my office if there are any issues.    Visit time 35 minutes.      Again, thank you for allowing me to participate in the care of your patient.      Sincerely,    Ahsvin Haider MD     week(s)

## 2024-04-26 NOTE — ASSESSMENT
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 Clinical stage T4,N1 IV B.\par Plan for definitive chemoRT due to risks and limitations with surgery  ( see HPI) \par \par On weekly cisplatin with RT. Today chemo week 7. Tolerating well and responding on exam.\par Last chemotherapy- today.  Last RT on Fri 6/19/20.\par \par Evaluate response - PET in ~  10-12 weeks ( per Dr Pittman). Follows with ENT .\par  Monitor counts/ lytes- labs next week next PRN.\par \par Spiculated lung nodule- most likely second primary - obtain CT chest with iv contrast ( after chemoRT completed)- June/ July. \par \par Return visit in early July - after scans. Likely will need wedge resection.\par \par 
No

## 2024-06-11 NOTE — H&P PST ADULT - NEGATIVE GASTROINTESTINAL SYMPTOMS
Problem: Adult Mental Health  Goal: Reports or exhibits reduction in symptoms associated with elevated or labile mood/ksenia  Outcome: Monitoring/Evaluating progress  Goal: Reports or exhibits improvement in depressive mood signs/symptoms  Outcome: Monitoring/Evaluating progress  Goal: Reports or exhibits an improvement in mood signs/symptoms associated with anxiety  Outcome: Monitoring/Evaluating progress  Goal: Demonstrates ability to proactively perform self cares  Outcome: Monitoring/Evaluating progress  Goal: Demonstrates the ability to engage in reality-based communication and refrains from acting on delusional thoughts  Outcome: Monitoring/Evaluating progress  Goal: Reports or exhibits hallucinations absent or at manageable level  Outcome: Monitoring/Evaluating progress  Goal: Demonstrates improved ability to track conversation, process information  Outcome: Monitoring/Evaluating progress  Goal: Reports decrease in thoughts of suicide  Outcome: Monitoring/Evaluating progress  Goal: Reports decrease in thoughts of violence  Outcome: Monitoring/Evaluating progress  Goal: Verbalizes when symptoms of illness are triggered and reports to staff when experiencing urges/intent of suicide  Outcome: Monitoring/Evaluating progress  Goal: Verbalizes when symptoms of illness are triggered and reports to staff when experiencing urges/intent of violence  Outcome: Monitoring/Evaluating progress  Goal: Denies having a suicidal plan  Outcome: Monitoring/Evaluating progress  Goal: Denies having a homicidal plan  Outcome: Monitoring/Evaluating progress  Goal: Verbalizes understanding of positive individual coping skills  Outcome: Monitoring/Evaluating progress  Goal: Demonstrates control of behavior, refraining from hostility, aggression or threats of violence  Outcome: Monitoring/Evaluating progress  Goal: Verbalizes understanding of diagnosis, reason for treament and treatment program  Outcome: Monitoring/Evaluating  progress  Goal: Verbalizes understanding of medication management/monitoring/assessment of effectiveness  Outcome: Monitoring/Evaluating progress  Goal: Demonstrates or reports decrease in symptoms of paranoia or delusional thoughts  Outcome: Monitoring/Evaluating progress     Problem: Depressive Symptoms  Goal: #Depressive s/s (self-reported/observed) are recognized and monitored  Outcome: Monitoring/Evaluating progress  Goal: #Verbalizes understanding of depressive symptoms management  Description: Document in Patient Education Activity  Outcome: Monitoring/Evaluating progress     Problem: Suicide, Risk for  Goal: #Suicidal thoughts, plans, and behaviors are monitored  Description: Suicidal Thoughts  a) Passive thoughts about wanting to be dead with no plan, past history of suicide, or suicidal behavior  b) Active suicidal ideation involves an existing wish to die accompanied by a plan for how to carry out the death, or suicidal behavior.  Active suicidal ideation/suicidal behavior require intervention  Suicide Behaviors (including preparatory acts)  Acts or preparation toward making a suicide attempt, but before potential for harm has begun.  This includes behaviors such as assembling a method (e.g. buying a gun, collecting pills) or preparing for one's death by suicide (e.g. writing a suicide note, giving things away)  Outcome: Monitoring/Evaluating progress  Goal: #Verbalizes understanding of suicide monitoring, precautions, and prevention  Description: Document in Patient Education Activity   Outcome: Monitoring/Evaluating progress     Problem: Suicide, Risk for  Goal: Remains free from self-harm  Outcome: Met, complete goal      no nausea/no vomiting/no diarrhea/no constipation/no abdominal pain/no melena

## 2025-02-03 NOTE — ED PROVIDER NOTE - CPE EDP ENMT NORM
Medication:   valsartan (DIOVAN) 160 MG tablet 90 tablet 1 7/10/2024 --    Sig - Route: Take 1 tablet by mouth daily. - Oral     passed protocol.   Last office visit date: 7/10/2024   Next appointment scheduled?: Yes   Number of refills given: 1        BP Readings from Last 3 Encounters:   07/10/24 122/84   01/10/24 108/72   08/11/23 131/87     Pulse Readings from Last 3 Encounters:   07/10/24 68   01/10/24 72   08/11/23 67       Hemoglobin A1C (%)   Date Value   07/05/2024 5.9 (H)     TSH (mcUnits/mL)   Date Value   12/12/2019 2.155     Lab Results   Component Value Date    CHOLESTEROL 108 07/05/2024    HDL 42 07/05/2024    CALCLDL 50 07/05/2024    TRIGLYCERIDE 80 07/05/2024     Lab Results   Component Value Date    SODIUM 140 07/05/2024    POTASSIUM 4.2 07/05/2024    CHLORIDE 108 07/05/2024    CO2 27 07/05/2024    BUN 13 07/05/2024    CREATININE 0.90 07/05/2024    GLUCOSE 98 07/05/2024     Lab Results   Component Value Date    AST 35 07/05/2024    GPT 54 07/05/2024    ALKPT 93 07/05/2024    BILIRUBIN 0.8 07/05/2024     
normal...